# Patient Record
Sex: MALE | Race: WHITE | NOT HISPANIC OR LATINO | Employment: OTHER | URBAN - METROPOLITAN AREA
[De-identification: names, ages, dates, MRNs, and addresses within clinical notes are randomized per-mention and may not be internally consistent; named-entity substitution may affect disease eponyms.]

---

## 2017-08-10 ENCOUNTER — OFFICE VISIT (OUTPATIENT)
Dept: FAMILY MEDICINE | Facility: CLINIC | Age: 57
End: 2017-08-10
Payer: COMMERCIAL

## 2017-08-10 VITALS
HEIGHT: 70 IN | WEIGHT: 195.4 LBS | TEMPERATURE: 98.3 F | DIASTOLIC BLOOD PRESSURE: 72 MMHG | SYSTOLIC BLOOD PRESSURE: 103 MMHG | HEART RATE: 79 BPM | BODY MASS INDEX: 27.97 KG/M2

## 2017-08-10 DIAGNOSIS — Z72.0 TOBACCO ABUSE: Primary | ICD-10-CM

## 2017-08-10 PROCEDURE — 99213 OFFICE O/P EST LOW 20 MIN: CPT | Performed by: FAMILY MEDICINE

## 2017-08-10 RX ORDER — BUPROPION HYDROCHLORIDE 150 MG/1
TABLET, EXTENDED RELEASE ORAL
Qty: 60 TABLET | Refills: 2 | Status: SHIPPED | OUTPATIENT
Start: 2017-08-10 | End: 2018-05-14

## 2017-08-10 NOTE — MR AVS SNAPSHOT
After Visit Summary   8/10/2017    Jerald Lawrence    MRN: [id number]           Patient Information     Date Of Birth          1960        Visit Information        Provider Department      8/10/2017 4:00 PM Skip Rubio MD [facility]        Today's Diagnoses     Tobacco abuse    -  1      Care Instructions    Prescription for Bupropionwas sent to your pharmacy as you requested. Watch out for possible side effects: stomach upset, heartburn, vivid dreams or mood instability.  Call clinic if these are experienced.  Stop smoking completely within 1-2 weeks of starting the medication.  Return to clinic in 1 month for a follow up if necessary.    Look into tapping resources in Quit Plan hotline or website for assist in smoking cessation. Refer to the card/handout given to you.          Follow-ups after your visit        Who to contact     If you have questions or need follow up information about today's clinic visit or your schedule please contact [facility] directly at [phone].  Normal or non-critical lab and imaging results will be communicated to you by SinColahart, letter or phone within 4 business days after the clinic has received the results. If you do not hear from us within 7 days, please contact the clinic through AGRIMAPSt or phone. If you have a critical or abnormal lab result, we will notify you by phone as soon as possible.  Submit refill requests through Money Toolkit or call your pharmacy and they will forward the refill request to us. Please allow 3 business days for your refill to be completed.          Additional Information About Your Visit        MyChart Information     [employer]kit gives you secure access to your electronic health record. If you see a primary care provider, you can also send messages to your care team and make appointments. If you have questions, please call your primary care clinic.  If you do not have a primary care provider, please  "call [phone] and they will assist you.        Care EveryWhere ID     This is your Care EveryWhere ID. This could be used by other organizations to access your [place] medical records  [id number]        Your Vitals Were     Pulse Temperature Height BMI (Body Mass Index)          79 98.3  F (36.8  C) (Tympanic) 5' 10.25\" (1.784 m) 27.84 kg/m2         Blood Pressure from Last 3 Encounters:   08/10/17 103/72   04/20/16 115/73   04/28/14 119/84    Weight from Last 3 Encounters:   08/10/17 195 lb 6.4 oz (88.6 kg)   04/20/16 204 lb (92.5 kg)   04/21/14 179 lb (81.2 kg)              Today, you had the following     No orders found for display         Today's Medication Changes          These changes are accurate as of: 8/10/17  4:19 PM.  If you have any questions, ask your nurse or doctor.               Start taking these medicines.        Dose/Directions    buPROPion 150 MG 12 hr tablet   Commonly known as:  WELLBUTRIN SR   Used for:  Tobacco abuse   Started by:  Skip Rubio MD        Take 1 tablet once daily and increase to 1 tablet twice daily after 4 to 7 days   Quantity:  60 tablet   Refills:  2            Where to get your medicines      These medications were sent to [facility] [address] [facility] [facility]  [address] [facility] [address]     Phone:  [phone]     buPROPion 150 MG 12 hr tablet                Primary Care Provider Office Phone # Fax #    Ewa ANAIS Forde [facility] [phone]       [facility] [facility] [place] [address]        Equal Access to Services     CHIQUITA CONN AH: Hadii augusto Carmona, carida luedilia, qaybta kaalwiliam driver. So [facility] [phone].    ATENCIÓN: Si habla español, tiene a mayorga disposición servicios gratuitos de asistencia lingüística. Llame al [phone].    We comply with applicable federal civil rights laws and [place] laws. We do not discriminate on the basis " of race, color, national origin, age, disability sex, sexual orientation or gender identity.            Thank you!     Thank you for choosing [facility]  for your care. Our goal is always to provide you with excellent care. Hearing back from our patients is one way we can continue to improve our services. Please take a few minutes to complete the written survey that you may receive in the mail after your visit with us. Thank you!             Your Updated Medication List - Protect others around you: Learn how to safely use, store and throw away your medicines at www.disposemymeds.org.          This list is accurate as of: 8/10/17  4:19 PM.  Always use your most recent med list.                   Brand Name Dispense Instructions for use Diagnosis    buPROPion 150 MG 12 hr tablet    WELLBUTRIN SR    60 tablet    Take 1 tablet once daily and increase to 1 tablet twice daily after 4 to 7 days    Tobacco abuse       NO ACTIVE MEDICATIONS      None Entered

## 2017-08-10 NOTE — PROGRESS NOTES
SUBJECTIVE:                                                    Jerald Lawrence is a 57 year old male who presents to clinic today for the following health issues:  Chief Complaint   Patient presents with     Smoking Cessation     Pt here for quit smoking meds.  He is interested in using zyban.       Pt has smoked for 30 yrs.  Pt has quit in the past, most recent episode quit for 2 1/2 yrs.  Restarted smoking 3 months ago.  Pt used generic zyban last time he quit.    Would like to try again.    Patient denies anxiety, mood instability, GERD, dyspnea, cough, weight loss, dysphagia or mouth ulcers.      Problem list and histories reviewed & adjusted, as indicated.  Additional history: as documented    Patient Active Problem List   Diagnosis     CARDIOVASCULAR SCREENING; LDL GOAL LESS THAN 130     Tobacco abuse     Thyroid nodule     History reviewed. No pertinent surgical history.    Social History   Substance Use Topics     Smoking status: Current Every Day Smoker     Packs/day: 0.75     Years: 30.00     Types: Cigarettes     Smokeless tobacco: Current User     Alcohol use No     Family History   Problem Relation Age of Onset     Cancer - colorectal Father      C.A.D. Father      Blood Disease Brother      Cardiovascular Brother      C.A.D. Brother          Current Outpatient Prescriptions   Medication Sig Dispense Refill     buPROPion (WELLBUTRIN SR) 150 MG 12 hr tablet Take 1 tablet once daily and increase to 1 tablet twice daily after 4 to 7 days 60 tablet 2     NO ACTIVE MEDICATIONS None Entered       No Known Allergies      Reviewed and updated as needed this visit by clinical staffTobacco  Allergies  Med Hx  Surg Hx  Fam Hx  Soc Hx      Reviewed and updated as needed this visit by Provider         ROS:  C: NEGATIVE for fever, chills, change in weight  I: NEGATIVE for worrisome rashes, moles or lesions  E: NEGATIVE for vision changes or irritation  E/M: NEGATIVE for ear, mouth and throat problems  R:  "NEGATIVE for significant cough or SOB  CV: NEGATIVE for chest pain, palpitations or peripheral edema  GI: NEGATIVE for nausea, abdominal pain, heartburn, or change in bowel habits  : NEGATIVE for frequency, dysuria, or hematuria  M: NEGATIVE for significant arthralgias or myalgia  N: NEGATIVE for weakness, dizziness or paresthesias  E: NEGATIVE for temperature intolerance, skin/hair changes  H: NEGATIVE for bleeding problems  P: NEGATIVE for changes in mood or affect    OBJECTIVE:                                                    /72  Pulse 79  Temp 98.3  F (36.8  C) (Tympanic)  Ht 5' 10.25\" (1.784 m)  Wt 195 lb 6.4 oz (88.6 kg)  BMI 27.84 kg/m2  Body mass index is 27.84 kg/(m^2).  GENERAL:  alert and no distress, ambulatory w/o assist  MOUTH: moist mucosae; no oral or pharyngeal ulcers/mass  NECK: no tenderness, no adenopathy,  Thyroid not enlarged  RESP: lungs clear to auscultation - no rales, no rhonchi, no wheezes  CV: regular rates and rhythm, no murmur  MS: no edema  SKIN: no suspicious lesions, no rashes    Diagnostic test results:  Diagnostic Test Results:  none        ASSESSMENT/PLAN:                                                        ICD-10-CM    1. Tobacco abuse Z72.0 buPROPion (WELLBUTRIN SR) 150 MG 12 hr tablet     Discussed options for cessation.  Prescription for the above was sent to pharmacy as requested.  Advised to watch out for possible side effects and to call clinic if these occur.  Stop smoking completely within 1-2 weeksof starting the medication.  Quit plan card given to patient and discussed the program.    Follow up with Provider - 1-2 months if needed.   Patient Instructions     Prescription for Bupropionwas sent to your pharmacy as you requested. Watch out for possible side effects: stomach upset, heartburn, vivid dreams or mood instability.  Call clinic if these are experienced.  Stop smoking completely within 1-2 weeks of starting the medication.  Return to clinic in " 1 month for a follow up if necessary.    Look into tapping resources in Quit Plan hotline or website for assist in smoking cessation. Refer to the card/handout given to you.  Coping with Smoking Withdrawal  For the first few days after you quit smoking, you may feel cranky, restless, depressed, or low on energy. These are symptoms of withdrawal. Your body needs time to recover from smoking. Your symptoms should lessen within a few days.    Coping with the urge to smoke    Deep-breathe. Inhale through your nose. Count to 5. Slowly exhale through your mouth.    Drink water. Try to drink 8 or more 8-ounce glasses of water a day.    Keep your hands busy. Wash your car. Draw. Do a puzzle. Build a Dyyno.    Delay. The urge to smoke lasts only 3 to 5 minutes.  Get support  Individual, group, and telephone counseling can help keep you on track. Ask your healthcare provider for more information about resources available to you.  Control stress  After you quit, you may feel irritable and stressed. Try taking a warm bath or shower. Listen to music. Go for a walk or to the gym. Try yoga or meditate. Call friends or talk with a professional.  Exercise  Exercise helps your body and mind feel better. There are many ways to be more active. Find something you enjoy doing. See if a friend will join you for a walk or a bike ride.  Sleep better  You may feel tired but have trouble falling asleep. Try to relax before bed. Do a few stretching exercises. Read for a while. Also, avoid caffeine for at least a few hours before bedtime.  Get fit, not fat  You may notice an increased appetite. Many people who quit smoking gain a few pounds. To limit weight gain, try to watch what you eat. Cut back on fat in your diet. Snack on low-calorie foods, like fresh fruits and vegetables. Drink low-calorie liquids, especially water. Regular exercise can also help you stay fit. And remember: Your main goal is to be a nonsmoker. Stay focused on that  goal.  Quit-smoking products  There are a number of products that can help you quit smoking including medicines and nicotine replacement products. They are available over-the-counter or by prescription. Ask your healthcare provider if any of these could help you quit smoking.        For more information    https://smokefree.gov/eomc-tm-lp-expert    National [employer] [place] Smoking Quitline: 877-44U-QUIT (548-847-3631)   Date Last Reviewed: 2/1/2017 2000-2017 [facility]. [address] [place] [address], [place] [address]. All rights reserved. This information is not intended as a substitute for professional medical care. Always follow your healthcare professional's instructions.            Skip Rubio MD  [facility]

## 2017-08-10 NOTE — NURSING NOTE
"Chief Complaint   Patient presents with     Smoking Cessation     Pt here for quit smoking meds.  He is interested in using zyban.       Initial /72  Pulse 79  Temp 98.3  F (36.8  C) (Tympanic)  Ht 5' 10.25\" (1.784 m)  Wt 195 lb 6.4 oz (88.6 kg)  BMI 27.84 kg/m2 Estimated body mass index is 27.84 kg/(m^2) as calculated from the following:    Height as of this encounter: 5' 10.25\" (1.784 m).    Weight as of this encounter: 195 lb 6.4 oz (88.6 kg).  Medication Reconciliation: complete  Sharmin Cullen CMA    "

## 2017-08-10 NOTE — PATIENT INSTRUCTIONS
Prescription for Bupropionwas sent to your pharmacy as you requested. Watch out for possible side effects: stomach upset, heartburn, vivid dreams or mood instability.  Call clinic if these are experienced.  Stop smoking completely within 1-2 weeks of starting the medication.  Return to clinic in 1 month for a follow up if necessary.    Look into tapping resources in Quit Plan hotline or website for assist in smoking cessation. Refer to the card/handout given to you.  Coping with Smoking Withdrawal  For the first few days after you quit smoking, you may feel cranky, restless, depressed, or low on energy. These are symptoms of withdrawal. Your body needs time to recover from smoking. Your symptoms should lessen within a few days.    Coping with the urge to smoke    Deep-breathe. Inhale through your nose. Count to 5. Slowly exhale through your mouth.    Drink water. Try to drink 8 or more 8-ounce glasses of water a day.    Keep your hands busy. Wash your car. Draw. Do a puzzle. Build a [employer].    Delay. The urge to smoke lasts only 3 to 5 minutes.  Get support  Individual, group, and telephone counseling can help keep you on track. Ask your healthcare provider for more information about resources available to you.  Control stress  After you quit, you may feel irritable and stressed. Try taking a warm bath or shower. Listen to music. Go for a walk or to the gym. Try yoga or meditate. Call friends or talk with a professional.  Exercise  Exercise helps your body and mind feel better. There are many ways to be more active. Find something you enjoy doing. See if a friend will join you for a walk or a bike ride.  Sleep better  You may feel tired but have trouble falling asleep. Try to relax before bed. Do a few stretching exercises. Read for a while. Also, avoid caffeine for at least a few hours before bedtime.  Get fit, not fat  You may notice an increased appetite. Many people who quit smoking gain a few pounds. To  limit weight gain, try to watch what you eat. Cut back on fat in your diet. Snack on low-calorie foods, like fresh fruits and vegetables. Drink low-calorie liquids, especially water. Regular exercise can also help you stay fit. And remember: Your main goal is to be a nonsmoker. Stay focused on that goal.  Quit-smoking products  There are a number of products that can help you quit smoking including medicines and nicotine replacement products. They are available over-the-counter or by prescription. Ask your healthcare provider if any of these could help you quit smoking.        For more information    https://smokefree.gov/crxa-th-db-expert    National Cancer [place] Smoking Quitline: 877-44U-QUIT (336-602-4447)   Date Last Reviewed: 2/1/2017 2000-2017 [employer]. [address] [place] [address], [place] [address]. All rights reserved. This information is not intended as a substitute for professional medical care. Always follow your healthcare professional's instructions.

## 2017-10-16 ENCOUNTER — TELEPHONE (OUTPATIENT)
Dept: FAMILY MEDICINE | Facility: CLINIC | Age: 57
End: 2017-10-16

## 2017-10-16 NOTE — LETTER
October 16, 2017      Jerald Lawrence  [address] [place] [address]  [place] [address]        At this time you are due for a Colon Cancer Screening. Here is some information regarding this testing.     Recommended every 5-10 years, depending on your history, in order to prevent and detect colon cancer at its earliest stages.  Colon cancer is now the second leading cause of death in the [place] for both men and women and there are over 130,000 new cases and 50,000 deaths per year from colon cancer.  Colonoscopies can prevent 90-95% of these deaths.  Problem lesions can be removed before they ever become cancer. This test is not only looking for cancer, but also getting rid of precancerious lesions. You are usually given some sedation which makes the test very comfortable for most people.      If you do not wish to do a colonoscopy or cannot afford to do one, at this time, there is another option. It is called a FIT test or Fecal Immunochemical Occult Blood Test (take home stool sample kit).  It does not replace the colonoscopy for colorectal cancer screening, but it can detect hidden bleeding in the lower colon.  It does need to be repeated every year and if a positive result is obtained, you would be referred for a colonoscopy. The FIT test is really easy to do and does not require any  diet or medication restrictions and involves only one collection sample.      If you have completed either one of these tests or had a flexible sigmoidoscopy in the past five years at another facility, please have the records sent to our clinic so that we can best coordinate your care and update your chart.  Please call us if you have questions or would like arrange either to do a colonoscopy or obtain the necessary test kit for the Fecal Occult Test.          Sincerely,        Skip Rubio MD

## 2018-01-04 ENCOUNTER — TELEPHONE (OUTPATIENT)
Dept: FAMILY MEDICINE | Facility: CLINIC | Age: 58
End: 2018-01-04

## 2018-01-04 NOTE — TELEPHONE ENCOUNTER
1/4/2018    Call Regarding Preventive Health Screening Colonoscopy    Attempt 1    Message on voicemail     Comments:       Outreach   SB

## 2018-01-13 NOTE — TELEPHONE ENCOUNTER
1/13/2018    Call Regarding Preventive Health Screening Colonoscopy    Attempt 2    Message on voicemail    Comments:       Outreach   Jacki Saucedo

## 2018-02-16 NOTE — TELEPHONE ENCOUNTER
2/16/2018    Call Regarding Preventive Health Screening Colonoscopy    Attempt 3    Message No answer    Comments:       Outreach   CC

## 2018-05-14 ENCOUNTER — OFFICE VISIT (OUTPATIENT)
Dept: FAMILY MEDICINE | Facility: CLINIC | Age: 58
End: 2018-05-14
Payer: COMMERCIAL

## 2018-05-14 VITALS
DIASTOLIC BLOOD PRESSURE: 84 MMHG | TEMPERATURE: 97.5 F | BODY MASS INDEX: 28.77 KG/M2 | HEART RATE: 65 BPM | HEIGHT: 70 IN | WEIGHT: 201 LBS | SYSTOLIC BLOOD PRESSURE: 125 MMHG

## 2018-05-14 DIAGNOSIS — Z80.42 FAMILY HISTORY OF PROSTATE CANCER: ICD-10-CM

## 2018-05-14 DIAGNOSIS — Z00.00 ENCOUNTER FOR ROUTINE ADULT HEALTH EXAMINATION WITHOUT ABNORMAL FINDINGS: Primary | ICD-10-CM

## 2018-05-14 DIAGNOSIS — Z72.0 TOBACCO ABUSE: ICD-10-CM

## 2018-05-14 DIAGNOSIS — Z12.5 SCREENING FOR PROSTATE CANCER: ICD-10-CM

## 2018-05-14 DIAGNOSIS — H91.93 BILATERAL HEARING LOSS, UNSPECIFIED HEARING LOSS TYPE: ICD-10-CM

## 2018-05-14 DIAGNOSIS — E78.2 MIXED HYPERLIPIDEMIA: ICD-10-CM

## 2018-05-14 DIAGNOSIS — Z11.4 SCREENING FOR HUMAN IMMUNODEFICIENCY VIRUS: ICD-10-CM

## 2018-05-14 DIAGNOSIS — Z11.59 NEED FOR HEPATITIS C SCREENING TEST: ICD-10-CM

## 2018-05-14 DIAGNOSIS — Z13.1 SCREENING FOR DIABETES MELLITUS: ICD-10-CM

## 2018-05-14 DIAGNOSIS — Z12.11 SCREENING FOR MALIGNANT NEOPLASM OF COLON: ICD-10-CM

## 2018-05-14 LAB
CHOLEST SERPL-MCNC: 221 MG/DL
GLUCOSE SERPL-MCNC: 92 MG/DL (ref 70–99)
HDLC SERPL-MCNC: 49 MG/DL
LDLC SERPL CALC-MCNC: 157 MG/DL
NONHDLC SERPL-MCNC: 172 MG/DL
PSA SERPL-ACNC: 1.09 UG/L (ref 0–4)
TRIGL SERPL-MCNC: 73 MG/DL

## 2018-05-14 PROCEDURE — 80061 LIPID PANEL: CPT | Performed by: FAMILY MEDICINE

## 2018-05-14 PROCEDURE — 86803 HEPATITIS C AB TEST: CPT | Performed by: FAMILY MEDICINE

## 2018-05-14 PROCEDURE — G0103 PSA SCREENING: HCPCS | Performed by: FAMILY MEDICINE

## 2018-05-14 PROCEDURE — 82947 ASSAY GLUCOSE BLOOD QUANT: CPT | Performed by: FAMILY MEDICINE

## 2018-05-14 PROCEDURE — 36415 COLL VENOUS BLD VENIPUNCTURE: CPT | Performed by: FAMILY MEDICINE

## 2018-05-14 PROCEDURE — 99396 PREV VISIT EST AGE 40-64: CPT | Performed by: FAMILY MEDICINE

## 2018-05-14 PROCEDURE — 87389 HIV-1 AG W/HIV-1&-2 AB AG IA: CPT | Performed by: FAMILY MEDICINE

## 2018-05-14 NOTE — PATIENT INSTRUCTIONS
Start Aspirin 81 mg orally once a day.    You are strongly advised to stop smoking soon!  Continuing to smoke will increase your risk for heart disease, stroke, cancer, and  lung damage.  Look into tapping resources in Quit Plan hotline or website for assist in smoking cessation. Refer to the card/handout given to you.    Go to [facility] now for your blood draw.  You will be contacted in 1-2 days for results of your lab tests.    Be consistent with low trans fat and saturated fat diet.  Eat food rich in omega-3-fatty acids as you tolerate. (salmon, olive oil)  Eat 5 cups of vegetables, fruits and whole grains per day.  Limit starchy food (white rice, white bread, white pasta, white potatoes) to less than a cup per meal.  Minimize sweets, junk food and fastfood. Limit soda beverages to one serving per day; best to avoid it altogether though.  Exercise: moderate intensity sustained for at least 30 mins per episode, goal of 150 mins per week at least  Combine cardiovascular and resistance exercises.  These exercise recommendations are in addition to your daily activity at work or home.  Work on losing some weight    Schedule colonoscopy for screening for colon cancer at your soonest convenience.    Preventive Health Recommendations  Male Ages 50   64    Yearly exam:             See your health care provider every year in order to  o   Review health changes.   o   Discuss preventive care.    o   Review your medicines if your doctor has prescribed any.     Have a cholesterol test every 5 years, or more frequently if you are at risk for high cholesterol/heart disease.     Have a diabetes test (fasting glucose) every three years. If you are at risk for diabetes, you should have this test more often.     Have a colonoscopy at age 50, or have a yearly FIT test (stool test). These exams will check for colon cancer.      Talk with your health care provider about whether or not a prostate cancer screening test (PSA) is right for  you.    You should be tested each year for STDs (sexually transmitted diseases), if you re at risk.     Shots: Get a flu shot each year. Get a tetanus shot every 10 years.     Nutrition:    Eat at least 5 servings of fruits and vegetables daily.     Eat whole-grain bread, whole-wheat pasta and brown rice instead of white grains and rice.     Talk to your provider about Calcium and Vitamin D.     Lifestyle    Exercise for at least 150 minutes a week (30 minutes a day, 5 days a week). This will help you control your weight and prevent disease.     Limit alcohol to one drink per day.     No smoking.     Wear sunscreen to prevent skin cancer.     See your dentist every six months for an exam and cleaning.     See your eye doctor every 1 to 2 years.

## 2018-05-14 NOTE — MR AVS SNAPSHOT
After Visit Summary   5/14/2018    Jerald Lawrence    MRN: [id number]           Patient Information     Date Of Birth          1960        Visit Information        Provider Department      5/14/2018 7:40 AM Skip Rubio MD [facility]        Today's Diagnoses     Encounter for routine adult health examination without abnormal findings    -  1    Bilateral hearing loss, unspecified hearing loss type        Tobacco abuse        Mixed hyperlipidemia        Screening for diabetes mellitus        Family history of prostate cancer        Screening for prostate cancer        Screening for human immunodeficiency virus        Need for hepatitis C screening test        Screening for malignant neoplasm of colon          Care Instructions    Start Aspirin 81 mg orally once a day.    You are strongly advised to stop smoking soon!  Continuing to smoke will increase your risk for heart disease, stroke, cancer, and  lung damage.  Look into tapping resources in Quit Plan hotline or website for assist in smoking cessation. Refer to the card/handout given to you.    Go to [facility] now for your blood draw.  You will be contacted in 1-2 days for results of your lab tests.    Be consistent with low trans fat and saturated fat diet.  Eat food rich in omega-3-fatty acids as you tolerate. (salmon, olive oil)  Eat 5 cups of vegetables, fruits and whole grains per day.  Limit starchy food (white rice, white bread, white pasta, white potatoes) to less than a cup per meal.  Minimize sweets, junk food and fastfood. Limit soda beverages to one serving per day; best to avoid it altogether though.  Exercise: moderate intensity sustained for at least 30 mins per episode, goal of 150 mins per week at least  Combine cardiovascular and resistance exercises.  These exercise recommendations are in addition to your daily activity at work or home.  Work on losing some weight    Schedule colonoscopy for screening  for colon cancer at your soonest convenience.    Preventive Health Recommendations  Male Ages 50 - 64    Yearly exam:             See your health care provider every year in order to  o   Review health changes.   o   Discuss preventive care.    o   Review your medicines if your doctor has prescribed any.     Have a cholesterol test every 5 years, or more frequently if you are at risk for high cholesterol/heart disease.     Have a diabetes test (fasting glucose) every three years. If you are at risk for diabetes, you should have this test more often.     Have a colonoscopy at age 50, or have a yearly FIT test (stool test). These exams will check for colon cancer.      Talk with your health care provider about whether or not a prostate cancer screening test (PSA) is right for you.    You should be tested each year for STDs (sexually transmitted diseases), if you re at risk.     Shots: Get a flu shot each year. Get a tetanus shot every 10 years.     Nutrition:    Eat at least 5 servings of fruits and vegetables daily.     Eat whole-grain bread, whole-wheat pasta and brown rice instead of white grains and rice.     Talk to your provider about Calcium and Vitamin D.     Lifestyle    Exercise for at least 150 minutes a week (30 minutes a day, 5 days a week). This will help you control your weight and prevent disease.     Limit alcohol to one drink per day.     No smoking.     Wear sunscreen to prevent skin cancer.     See your dentist every six months for an exam and cleaning.     See your eye doctor every 1 to 2 years.            Follow-ups after your visit        Additional Services     GASTROENTEROLOGY ADULT REF PROCEDURE ONLY [facility] [phone]; [place] General Surgeon       Last Lab Result: Creatinine (mg/dL)       Date                     Value                 04/18/2014               0.88             ----------  Body mass index is 28.64 kg/(m^2).     Needed:  No  Language:  English    Patient  will be contacted to schedule procedure.     Please be aware that coverage of these services is subject to the terms and limitations of your health insurance plan.  Call member services at your health plan with any benefit or coverage questions.  Any procedures must be performed at a [facility] OR coordinated by your clinic's referral office.    Please bring the following with you to your appointment:    (1) Any X-Rays, CTs or MRIs which have been performed.  Contact the facility where they were done to arrange for  prior to your scheduled appointment.    (2) List of current medications   (3) This referral request   (4) Any documents/labs given to you for this referral                  Follow-up notes from your care team     Return if symptoms worsen or fail to improve.      Who to contact     If you have questions or need follow up information about today's clinic visit or your schedule please contact [facility] directly at [phone].  Normal or non-critical lab and imaging results will be communicated to you by MyChart, letter or phone within 4 business days after the clinic has received the results. If you do not hear from us within 7 days, please contact the clinic through G[facility]art or phone. If you have a critical or abnormal lab result, we will notify you by phone as soon as possible.  Submit refill requests through [employer] or call your pharmacy and they will forward the refill request to us. Please allow 3 business days for your refill to be completed.          Additional Information About Your Visit        GLSShart Information     [employer] gives you secure access to your electronic health record. If you see a primary care provider, you can also send messages to your care team and make appointments. If you have questions, please call your primary care clinic.  If you do not have a primary care provider, please call [phone] and they will assist you.        Care EveryWhere ID      "This is your Care EveryWhere ID. This could be used by other organizations to access your [facility] medical records  [id number]        Your Vitals Were     Pulse Temperature Height BMI (Body Mass Index)          65 97.5  F (36.4  C) (Tympanic) 5' 10.25\" (1.784 m) 28.64 kg/m2         Blood Pressure from Last 3 Encounters:   05/14/18 125/84   08/10/17 103/72   04/20/16 115/73    Weight from Last 3 Encounters:   05/14/18 201 lb (91.2 kg)   08/10/17 195 lb 6.4 oz (88.6 kg)   04/20/16 204 lb (92.5 kg)              We Performed the Following     GASTROENTEROLOGY ADULT REF PROCEDURE ONLY [facility] [phone]; [facility] General Surgeon     GLUCOSE     Hepatitis C Screen Reflex to HCV RNA Quant and Genotype     HIV Antigen Antibody Combo     Lipid panel reflex to direct LDL Fasting     Prostate spec antigen screen        Primary Care Provider Office Phone # Fax #    Ewa Sal, APRN [facility] [phone] [address] [facility] [address]        Equal Access to Services     CHIQUITA CONN : Hadii aad ku hadasho Soomaali, waaxda luqadaha, qaybta kaalmada adeegyada, wiliam castro . So [facility] [phone].    ATENCIÓN: Si habla español, tiene a mayorga disposición servicios gratuitos de asistencia lingüística. Miladisame al [phone].    We comply with applicable federal civil rights laws and [place] laws. We do not discriminate on the basis of race, color, national origin, age, disability, sex, sexual orientation, or gender identity.            Thank you!     Thank you for choosing [facility]  for your care. Our goal is always to provide you with excellent care. Hearing back from our patients is one way we can continue to improve our services. Please take a few minutes to complete the written survey that you may receive in the mail after your visit with us. Thank you!             Your Updated Medication List - Protect others around you: Learn how to safely use, " store and throw away your medicines at www.disposemymeds.org.          This list is accurate as of 5/14/18  8:12 AM.  Always use your most recent med list.                   Brand Name Dispense Instructions for use Diagnosis    NO ACTIVE MEDICATIONS      None Entered

## 2018-05-14 NOTE — PROGRESS NOTES
SUBJECTIVE:   CC: Jerald Lawrence is an 57 year old male who presents for preventative health visit.     Healthy Habits:    Do you get at least three servings of calcium containing foods daily (dairy, green leafy vegetables, etc.)? yes    Amount of exercise or daily activities, outside of work: none    Problems taking medications regularly not applicable    Medication side effects: Na    Have you had an eye exam in the past two years? yes    Do you see a dentist twice per year? yes    Do you have sleep apnea, excessive snoring or daytime drowsiness?no     Check ears, is going to have a hearing test and getting fitted for hearing aids. Needs to schedule it still.  Patient states he uses earplugs at work.    Today's PHQ-2 Score:   PHQ-2 ( 1999 Pfizer) 5/14/2018 8/10/2017   Q1: Little interest or pleasure in doing things 0 0   Q2: Feeling down, depressed or hopeless 0 0   PHQ-2 Score 0 0       Abuse: Current or Past(Physical, Sexual or Emotional)- No  Do you feel safe in your environment - Yes    Social History   Substance Use Topics     Smoking status: Current Every Day Smoker     Packs/day: 0.75     Years: 35.00     Types: Cigarettes     Smokeless tobacco: Current User     Alcohol use No      If you drink alcohol do you typically have >3 drinks per day or >7 drinks per week? No   Patient states he would like to quit and plans to do cold turkey this time.                      Last PSA: No results found for: PSA  The ASCVD Risk score (Patricia NORMA Jr, et al., 2013) failed to calculate for the following reasons:    Cannot find a previous HDL lab    Cannot find a previous total cholesterol lab   Patient is eligible for use of low-dose aspirin for primary prevention of heart attack and stroke.  Provider has discussed aspirin with patient and our decision was:     Prescribe:  Daily low-dose aspirin recommended for primary prevention, patient agrees with plan.      Reviewed orders with patient. Reviewed health maintenance and  updated orders accordingly - Yes  BP Readings from Last 3 Encounters:   05/14/18 125/84   08/10/17 103/72   04/20/16 115/73    Wt Readings from Last 3 Encounters:   05/14/18 201 lb (91.2 kg)   08/10/17 195 lb 6.4 oz (88.6 kg)   04/20/16 204 lb (92.5 kg)                  Patient Active Problem List   Diagnosis     CARDIOVASCULAR SCREENING; LDL GOAL LESS THAN 130     Tobacco abuse     Thyroid nodule     History reviewed. No pertinent surgical history.    Social History   Substance Use Topics     Smoking status: Current Every Day Smoker     Packs/day: 0.75     Years: 35.00     Types: Cigarettes     Smokeless tobacco: Current User     Alcohol use No     Family History   Problem Relation Age of Onset     Cancer - colorectal Father      C.A.D. Father      Blood Disease Brother      Cardiovascular Brother      C.A.D. Brother          Current Outpatient Prescriptions   Medication Sig Dispense Refill     NO ACTIVE MEDICATIONS None Entered       No Known Allergies    Reviewed and updated as needed this visit by clinical staff  Tobacco  Allergies  Meds  Problems  Med Hx  Surg Hx  Fam Hx  Soc Hx          Reviewed and updated as needed this visit by Provider  Allergies  Meds  Problems        Past Medical History:   Diagnosis Date     Unspecified part of closed fracture of clavicle     comminuted displaced fx of left clavicle      History reviewed. No pertinent surgical history.    ROS:  C: NEGATIVE for fever, chills, change in weight  I: NEGATIVE for worrisome rashes, moles or lesions  E: NEGATIVE for vision changes or irritation  ENT: NEGATIVE for ear pain, mouth and throat problems  R: NEGATIVE for significant cough or SOB  CV: NEGATIVE for chest pain, palpitations or peripheral edema  GI: NEGATIVE for nausea, abdominal pain, heartburn, or change in bowel habits   male: negative for dysuria, hematuria, decreased urinary stream, erectile dysfunction, urethral discharge  M: NEGATIVE for significant arthralgias or  "myalgia  N: NEGATIVE for weakness, dizziness or paresthesias  E: NEGATIVE for temperature intolerance, skin/hair changes  H: NEGATIVE for bleeding problems  P: NEGATIVE for changes in mood or affect    OBJECTIVE:   /84  Pulse 65  Temp 97.5  F (36.4  C) (Tympanic)  Ht 5' 10.25\" (1.784 m)  Wt 201 lb (91.2 kg)  BMI 28.64 kg/m2  EXAM:  GENERAL APPEARANCE: overweight, alert and no distress  EYES: pink conj, no icterus, PERRL, EOMI  HENT: ear canals clear and TM's normal, nose and mouth without ulcers or lesions, oropharynx clear and oral mucous membranes moist  NECK: no adenopathy, no asymmetry, masses, or scars and thyroid normal to palpation  RESP: lungs clear to auscultation - no rales, rhonchi or wheezes  CV: regular rates and rhythm, normal S1 S2, no S3 or S4, no murmur, click or rub, no peripheral edema and peripheral pulses strong  ABDOMEN: soft, nontender, no hepatosplenomegaly, no masses and bowel sounds normal  RECTAL: normal sphincter tone, no rectal masses, prostate slightly enlarged but smooth, soft and nontender  MS: no musculoskeletal defects are noted and gait is age appropriate without ataxia  SKIN: no suspicious lesions or rashes  NEURO: Normal strength and tone, sensory exam grossly normal, mentation intact and speech normal    ASSESSMENT/PLAN:   Jerald was seen today for physical and health maintenance.    Diagnoses and all orders for this visit:    Encounter for routine adult health examination without abnormal findings  Patient was advised on recommended screening and preventive health recommendations.  He verbalized understanding and agreed to the plans below.    Bilateral hearing loss, unspecified hearing loss type  Patient was advisd his EAMs are clear of obstruction.  Patient will proceed with hearing test as he has planned.    Tobacco abuse  Discussed risks of continuous smoking tobacco.  Patient is not interested in quitting completely at this time.  Will continue to  in the " future.  Start ASA 81 mg daily.    Mixed hyperlipidemia  -     Lipid panel reflex to direct LDL Fasting  Reinforced heart healthy lifestyle.  Consider statin if with significant increase in LDL.    Screening for diabetes mellitus  -     GLUCOSE    Family history of prostate cancer    Screening for prostate cancer  -     Prostate spec antigen screen    Screening for human immunodeficiency virus  -     HIV Antigen Antibody Combo    Need for hepatitis C screening test  -     Hepatitis C Screen Reflex to HCV RNA Quant and Genotype    Screening for malignant neoplasm of colon  -     GASTROENTEROLOGY ADULT REF PROCEDURE ONLY [facility] [phone]; [place] General Surgeon      COUNSELING:  Reviewed preventive health counseling, as reflected in patient instructions       Regular exercise       Healthy diet/nutrition       Vision screening       Hearing screening       Aspirin Prophylaxsis       Alcohol Use       Safe sex practices/STD prevention       Consider Hep C screening for patients born between 1945 and 1965       HIV screeninx in teen years, 1x in adult years, and at intervals if high risk       Colon cancer screening       Prostate cancer screening       Osteoporosis Prevention/Bone Health       The ASCVD Risk score (Patricia GREEN Jr, et al., 2013) failed to calculate for the following reasons:    Cannot find a previous HDL lab    Cannot find a previous total cholesterol lab       Advance Care Planning    BP Screening:   Last 3 BP Readings:    BP Readings from Last 3 Encounters:   18 125/84   08/10/17 103/72   16 115/73       The following was recommended to the patient:  Re-screen BP within a year and recommended lifestyle modifications   reports that he has been smoking Cigarettes.  He has a 26.25 pack-year smoking history. He uses smokeless tobacco.  Tobacco Cessation Action Plan: quitplan card given to patient; pt would like to try cold turkey.  Estimated body mass index is 28.64 kg/(m^2) as  "calculated from the following:    Height as of this encounter: 5' 10.25\" (1.784 m).    Weight as of this encounter: 201 lb (91.2 kg).   Weight management plan: Discussed healthy diet and exercise guidelines and patient will follow up in 12 months in clinic to re-evaluate.    Counseling Resources:  ATP IV Guidelines  Pooled Cohorts Equation Calculator  FRAX Risk Assessment  ICSI Preventive Guidelines  Dietary Guidelines for Americans, 2010  USDA's MyPlate  ASA Prophylaxis  Lung CA Screening    Skip Rubio MD  [facility]  "

## 2018-05-15 LAB
HCV AB SERPL QL IA: NONREACTIVE
HIV 1+2 AB+HIV1 P24 AG SERPL QL IA: NONREACTIVE

## 2018-05-16 DIAGNOSIS — E78.2 MIXED HYPERLIPIDEMIA: Primary | ICD-10-CM

## 2018-05-21 PROBLEM — E78.2 MIXED HYPERLIPIDEMIA: Status: ACTIVE | Noted: 2018-05-21

## 2020-02-10 ENCOUNTER — HEALTH MAINTENANCE LETTER (OUTPATIENT)
Age: 60
End: 2020-02-10

## 2020-11-16 ENCOUNTER — HEALTH MAINTENANCE LETTER (OUTPATIENT)
Age: 60
End: 2020-11-16

## 2021-01-11 ENCOUNTER — VIRTUAL VISIT (OUTPATIENT)
Dept: FAMILY MEDICINE | Facility: CLINIC | Age: 61
End: 2021-01-11
Payer: COMMERCIAL

## 2021-01-11 DIAGNOSIS — Z71.6 ENCOUNTER FOR TOBACCO USE CESSATION COUNSELING: ICD-10-CM

## 2021-01-11 DIAGNOSIS — Z72.0 TOBACCO ABUSE: Primary | ICD-10-CM

## 2021-01-11 PROCEDURE — 99213 OFFICE O/P EST LOW 20 MIN: CPT | Mod: 95 | Performed by: FAMILY MEDICINE

## 2021-01-11 RX ORDER — BUPROPION HYDROCHLORIDE 150 MG/1
150 TABLET, FILM COATED, EXTENDED RELEASE ORAL 2 TIMES DAILY
Qty: 60 TABLET | Refills: 0 | Status: SHIPPED | OUTPATIENT
Start: 2021-01-11 | End: 2023-04-26

## 2021-01-11 RX ORDER — ASPIRIN 81 MG/1
81 TABLET ORAL DAILY
Status: ON HOLD | COMMUNITY
End: 2023-07-04

## 2021-01-11 RX ORDER — NICOTINE 21 MG/24HR
1 PATCH, TRANSDERMAL 24 HOURS TRANSDERMAL EVERY 24 HOURS
Qty: 30 PATCH | Refills: 0 | Status: SHIPPED | OUTPATIENT
Start: 2021-01-11 | End: 2023-04-26

## 2021-01-11 NOTE — PATIENT INSTRUCTIONS
Start zyban and nicotine patches.  Need to wean down the patches over several weeks.    Stop smoking completely as you start using the patches.    Get a pneumonia vaccine to protect yourself from serious lung infections.      Patient Education     Getting Support for Quitting Smoking  You don t have to go through the process of quitting smoking without support. Tell people you are quitting. The support of friends, coworkers, and family members can make a big difference. Face-to-face or telephone counseling can also be helpful, as can a stop-smoking class or an ex-smokers  group.     Set a quit date  If you re serious about quitting smoking, choose a date within the next 2 to 4 weeks. Juan Antonio it in bright, bold letters on a calendar you use often. Tell people about your quit date. Ask for their support. Let your friends and family know how they can help you quit.   Make a contract  A quit-smoking contract gives you a goal. Write out the contract and sign it. Have it witnessed, if you like. Then keep the contract where you ll see it often, or carry it with you. Read the contract when you re tempted to smoke.   Take action  On the day you quit, reread your quit contract. Think about the benefits you gain by quitting, such as better health and an improved sense of taste, as well as the money you will save from not smoking. Also:     Remove cigarettes from your home, car, or any other place where you stash them.    Throw away all smoking materials, including matches, lighters, and ashtrays.    Review your list of triggers and your plan for coping with them.    Stay away from people or settings you link with smoking.    Make a quit kit that includes gum, mints, carrot sticks, and things to keep your hands and mouth busy.    Talk to your healthcare provider about using quit-smoking products, such as medicine or a nicotine patch, inhaler, nasal spray, gum, or lozenges.  Ask for help  Sometimes you may just need to talk when  you miss smoking. Ex-smokers are good to talk to, because they re likely to know how you feel. You may need extra support in the first few weeks after you quit. Ask a friend to call you each day to see how you re doing. Telephone counseling can also help you keep on track. Ask your healthcare provider, local hospital, or public health department to put you in touch with a phone counselor. You may also have to deal with doubters when you decide to quit. Explain to any doubters why you are quitting. Tell them that quitting is important to you. Ask for their support. Tell your smoking buddies that you can walk together instead of smoking together. If someone thinks you won t succeed, say that you have a good quit plan and ask for their support. Let him or her know you re sticking with it.   To learn more  Get more tips from these resources:    www.cdc.gov/tobacco/quit_smoking/ 800-QUIT-NOW (465-293-8038)    www.smokefree.gov 877-44U-QUIT (548-020-5552)    www.lung.org/stop-smoking/ 800-LUNGUSA (417-439-2512)  Hita last reviewed this educational content on 5/1/2019 2000-2020 [employer]. [address] [place] [address], [place] [address]. All rights reserved. This information is not intended as a substitute for professional medical care. Always follow your healthcare professional's instructions.           Patient Education     Staying Smoke-Free  Quitting smoking is a big change. People will congratulate you. You have the right to be proud. But later at times you may miss smoking. Plan ahead to resist temptation.   Prepare to be tempted    If you feel the urge to smoke, distract yourself for about 5 to 15 minutes. Drink water. Call a friend, take a walk, or try deep breathing. Chew gum. Usually, the urge to smoke will pass.    Don t trust yourself to have  just one cigarette.  Many ex-smokers get hooked again that way.    Remind yourself why you quit. Tell yourself you can stay quit.    Stay away from people  or places that can trigger you to smoke. Ask others not to smoke in your home or car.    Spend time in places where you can t smoke, such as a museum, a library, a store, or a gym.    Take your nonsmoking life one day at a time. Juan Antonio each day on your calendar.    HALT your desire. Keep yourself from feeling too Hungry, Angry, Lonely, or Tired. Deal with your real needs. Eat, talk, or sleep.    Reward yourself! Put aside cigarette money and buy yourself a treat.    Talk with your healthcare provider about using quit-smoking products. These include medicine or a nicotine patch, inhaler, nasal spray, gum, or lozenges. These can help increase your success by reducing urges.     Deep breathing can help ease the urge to smoke.     If you slip  You may slip and smoke again. Many ex-smokers slip on the way to success. If you do, it s not the end of your quit process. Think about what triggered you to smoke. Then think of ways to prevent future slips. Ask yourself what you can learn from the slip. Decide how you will handle this trigger better in the future. Then get back on track--right away!   Don t give up  Keep telling yourself you re no longer a smoker. Don t lose hope. Most people have tried to quit several times before being successful. Try to stay focused on your plan to be smoke-free. Keep in mind all the benefits of staying quit. Millions of people have given up smoking. You can too.   To learn more    www.cdc.gov/tobacco/quit_smoking/ 800-QUIT-NOW (451-405-6924)    www.smokefree.gov 877-44U-QUIT (915-321-9373)    www.lung.org/stop-smoking/ 800-LUNGUSA (147-162-7020)    Yaw last reviewed this educational content on 5/1/2019 2000-2020 [employer]. [address] [place] [address], [place] [address]. All rights reserved. This information is not intended as a substitute for professional medical care. Always follow your healthcare professional's instructions.

## 2021-01-11 NOTE — PROGRESS NOTES
Jerald is a 60 year old who is being evaluated via a billable telephone visit.      What phone number would you like to be contacted at? [phone]  How would you like to obtain your AVS? Mail a copy  Assessment & Plan     Tobacco abuse    - buPROPion (ZYBAN) 150 MG 12 hr tablet  Dispense: 60 tablet; Refill: 0  - nicotine (NICODERM CQ) 21 MG/24HR 24 hr patch  Dispense: 30 patch; Refill: 0    Encounter for tobacco use cessation counseling    - buPROPion (ZYBAN) 150 MG 12 hr tablet  Dispense: 60 tablet; Refill: 0  - nicotine (NICODERM CQ) 21 MG/24HR 24 hr patch  Dispense: 30 patch; Refill: 0    Patient was advised on use of zyban and nicotine patches.  Advised ways to help success in cessation.  Advised possible ADR to meds.  Recheck in a month on use of his meds.  Tobacco Cessation:   reports that he has been smoking cigarettes. He has a 26.25 pack-year smoking history. He uses smokeless tobacco.  Tobacco Cessation Action Plan: Pharmacotherapies : Zyban/Wellbutrin and Nicotine patch      Patient Instructions     Start zyban and nicotine patches.  Need to wean down the patches over several weeks.    Stop smoking completely as you start using the patches.    Get a pneumonia vaccine to protect yourself from serious lung infections.      Patient Education     Getting Support for Quitting Smoking  You don t have to go through the process of quitting smoking without support. Tell people you are quitting. The support of friends, coworkers, and family members can make a big difference. Face-to-face or telephone counseling can also be helpful, as can a stop-smoking class or an ex-smokers  group.     Set a quit date  If you re serious about quitting smoking, choose a date within the next 2 to 4 weeks. Juan Antonio it in bright, bold letters on a calendar you use often. Tell people about your quit date. Ask for their support. Let your friends and family know how they can help you quit.   Make a contract  A quit-smoking contract gives  you a goal. Write out the contract and sign it. Have it witnessed, if you like. Then keep the contract where you ll see it often, or carry it with you. Read the contract when you re tempted to smoke.   Take action  On the day you quit, reread your quit contract. Think about the benefits you gain by quitting, such as better health and an improved sense of taste, as well as the money you will save from not smoking. Also:     Remove cigarettes from your home, car, or any other place where you stash them.    Throw away all smoking materials, including matches, lighters, and ashtrays.    Review your list of triggers and your plan for coping with them.    Stay away from people or settings you link with smoking.    Make a quit kit that includes gum, mints, carrot sticks, and things to keep your hands and mouth busy.    Talk to your healthcare provider about using quit-smoking products, such as medicine or a nicotine patch, inhaler, nasal spray, gum, or lozenges.  Ask for help  Sometimes you may just need to talk when you miss smoking. Ex-smokers are good to talk to, because they re likely to know how you feel. You may need extra support in the first few weeks after you quit. Ask a friend to call you each day to see how you re doing. Telephone counseling can also help you keep on track. Ask your healthcare provider, local hospital, or public health department to put you in touch with a phone counselor. You may also have to deal with doubters when you decide to quit. Explain to any doubters why you are quitting. Tell them that quitting is important to you. Ask for their support. Tell your smoking buddies that you can walk together instead of smoking together. If someone thinks you won t succeed, say that you have a good quit plan and ask for their support. Let him or her know you re sticking with it.   To learn more  Get more tips from these resources:    www.cdc.gov/tobacco/quit_smoking/ 800-QUIT-NOW  (128.957.4234)    www.smokefree.gov 877-44U-QUIT (463-363-8984)    www.lung.org/stop-smoking/ 800-LUNGUSA (071-500-1485)  Yaw last reviewed this educational content on 5/1/2019 2000-2020 [employer]. [address] [place] [address], [place] [address]. All rights reserved. This information is not intended as a substitute for professional medical care. Always follow your healthcare professional's instructions.           Patient Education     Staying Smoke-Free  Quitting smoking is a big change. People will congratulate you. You have the right to be proud. But later at times you may miss smoking. Plan ahead to resist temptation.   Prepare to be tempted    If you feel the urge to smoke, distract yourself for about 5 to 15 minutes. Drink water. Call a friend, take a walk, or try deep breathing. Chew gum. Usually, the urge to smoke will pass.    Don t trust yourself to have  just one cigarette.  Many ex-smokers get hooked again that way.    Remind yourself why you quit. Tell yourself you can stay quit.    Stay away from people or places that can trigger you to smoke. Ask others not to smoke in your home or car.    Spend time in places where you can t smoke, such as a museum, a library, a store, or a gym.    Take your nonsmoking life one day at a time. Juan Antonio each day on your calendar.    HALT your desire. Keep yourself from feeling too Hungry, Angry, Lonely, or Tired. Deal with your real needs. Eat, talk, or sleep.    Reward yourself! Put aside cigarette money and buy yourself a treat.    Talk with your healthcare provider about using quit-smoking products. These include medicine or a nicotine patch, inhaler, nasal spray, gum, or lozenges. These can help increase your success by reducing urges.     Deep breathing can help ease the urge to smoke.     If you slip  You may slip and smoke again. Many ex-smokers slip on the way to success. If you do, it s not the end of your quit process. Think about what triggered  you to smoke. Then think of ways to prevent future slips. Ask yourself what you can learn from the slip. Decide how you will handle this trigger better in the future. Then get back on track--right away!   Don t give up  Keep telling yourself you re no longer a smoker. Don t lose hope. Most people have tried to quit several times before being successful. Try to stay focused on your plan to be smoke-free. Keep in mind all the benefits of staying quit. Millions of people have given up smoking. You can too.   To learn more    www.cdc.gov/tobacco/quit_smoking/ 800-QUIT-NOW (140-490-7998)    www.smokefree.gov 877-44U-QUIT (126-890-7348)    www.lung.org/stop-smoking/ 800-LUNGUSA (144-150-4102)    Yaw last reviewed this educational content on 5/1/2019 2000-2020 [employer]. [address] [place] [address]. All rights reserved. This information is not intended as a substitute for professional medical care. Always follow your healthcare professional's instructions.               Return in about 1 month (around 2/11/2021) for Follow up on zyban use.    Skip Rubio MD  [facility]    Jung Marques is a 60 year old who presents to clinic today for the following health issues   Chief Complaint   Patient presents with     Smoking Cessation     Pt would like to stop smoking.         HPI   Pt would like to try a rx for zyban.  This has helped him in the past, stopped for 2 years.     Patient has been smoking a pack a day for the last year or year and half.  Tried cutting back already but goes to smoking a lot again.    Denies ADR to Zyban in the past.  Probably will use a nicotine patch initially.    Review of Systems   Constitutional, HEENT, cardiovascular, pulmonary, GI, , musculoskeletal, neuro, skin, endocrine and psych systems are negative, except as otherwise noted.      Objective           Vitals:  No vitals were obtained today due to virtual  visit.    Physical Exam   alert and no distress  PSYCH: Alert and oriented times 3; coherent speech, normal   rate and volume, able to articulate logical thoughts, able   to abstract reason, no tangential thoughts, no hallucinations   or delusions  His affect is normal  RESP: No cough, no audible wheezing, able to talk in full sentences  Remainder of exam unable to be completed due to telephone visits    No results found for any visits on 01/11/21.            Phone call duration: 8 minutes

## 2021-04-03 ENCOUNTER — HEALTH MAINTENANCE LETTER (OUTPATIENT)
Age: 61
End: 2021-04-03

## 2021-04-20 ENCOUNTER — IMMUNIZATION (OUTPATIENT)
Dept: FAMILY MEDICINE | Facility: CLINIC | Age: 61
End: 2021-04-20
Payer: COMMERCIAL

## 2021-04-20 PROCEDURE — 0011A PR COVID VAC MODERNA 100 MCG/0.5 ML IM: CPT

## 2021-04-20 PROCEDURE — 91301 PR COVID VAC MODERNA 100 MCG/0.5 ML IM: CPT

## 2021-05-18 ENCOUNTER — IMMUNIZATION (OUTPATIENT)
Dept: FAMILY MEDICINE | Facility: CLINIC | Age: 61
End: 2021-05-18
Attending: FAMILY MEDICINE
Payer: COMMERCIAL

## 2021-05-18 PROCEDURE — 0012A PR COVID VAC MODERNA 100 MCG/0.5 ML IM: CPT

## 2021-05-18 PROCEDURE — 91301 PR COVID VAC MODERNA 100 MCG/0.5 ML IM: CPT

## 2021-09-18 ENCOUNTER — HEALTH MAINTENANCE LETTER (OUTPATIENT)
Age: 61
End: 2021-09-18

## 2021-11-13 ENCOUNTER — HEALTH MAINTENANCE LETTER (OUTPATIENT)
Age: 61
End: 2021-11-13

## 2022-04-24 ENCOUNTER — HEALTH MAINTENANCE LETTER (OUTPATIENT)
Age: 62
End: 2022-04-24

## 2022-11-19 ENCOUNTER — HEALTH MAINTENANCE LETTER (OUTPATIENT)
Age: 62
End: 2022-11-19

## 2023-04-25 ASSESSMENT — ENCOUNTER SYMPTOMS
ARTHRALGIAS: 0
DYSURIA: 0
HEMATURIA: 0
EYE PAIN: 0
JOINT SWELLING: 0
CHILLS: 0
CONSTIPATION: 0
FREQUENCY: 0
SORE THROAT: 0
FEVER: 0
HEARTBURN: 0
COUGH: 0
DIZZINESS: 0
HEADACHES: 0
MYALGIAS: 0
WEAKNESS: 0
SHORTNESS OF BREATH: 0
NERVOUS/ANXIOUS: 0
NAUSEA: 0
PARESTHESIAS: 0
PALPITATIONS: 0
DIARRHEA: 0
ABDOMINAL PAIN: 0
HEMATOCHEZIA: 0

## 2023-04-26 ENCOUNTER — ANCILLARY PROCEDURE (OUTPATIENT)
Dept: GENERAL RADIOLOGY | Facility: CLINIC | Age: 63
End: 2023-04-26
Attending: FAMILY MEDICINE
Payer: COMMERCIAL

## 2023-04-26 ENCOUNTER — OFFICE VISIT (OUTPATIENT)
Dept: FAMILY MEDICINE | Facility: CLINIC | Age: 63
End: 2023-04-26
Payer: COMMERCIAL

## 2023-04-26 VITALS
WEIGHT: 198.8 LBS | HEART RATE: 61 BPM | TEMPERATURE: 97.2 F | HEIGHT: 71 IN | BODY MASS INDEX: 27.83 KG/M2 | OXYGEN SATURATION: 98 % | RESPIRATION RATE: 16 BRPM | SYSTOLIC BLOOD PRESSURE: 128 MMHG | DIASTOLIC BLOOD PRESSURE: 76 MMHG

## 2023-04-26 DIAGNOSIS — R07.9 RECURRENT CHEST PAIN: ICD-10-CM

## 2023-04-26 DIAGNOSIS — Z23 NEED FOR VACCINATION: ICD-10-CM

## 2023-04-26 DIAGNOSIS — I51.7 LEFT VENTRICULAR HYPERTROPHY BY ELECTROCARDIOGRAM: ICD-10-CM

## 2023-04-26 DIAGNOSIS — Z12.11 SCREEN FOR COLON CANCER: ICD-10-CM

## 2023-04-26 DIAGNOSIS — E78.2 MIXED HYPERLIPIDEMIA: ICD-10-CM

## 2023-04-26 DIAGNOSIS — Z00.00 ROUTINE GENERAL MEDICAL EXAMINATION AT A HEALTH CARE FACILITY: Primary | ICD-10-CM

## 2023-04-26 DIAGNOSIS — Z87.891 PERSONAL HISTORY OF TOBACCO USE: ICD-10-CM

## 2023-04-26 PROCEDURE — 71046 X-RAY EXAM CHEST 2 VIEWS: CPT | Mod: TC | Performed by: RADIOLOGY

## 2023-04-26 PROCEDURE — 90471 IMMUNIZATION ADMIN: CPT | Performed by: FAMILY MEDICINE

## 2023-04-26 PROCEDURE — 99396 PREV VISIT EST AGE 40-64: CPT | Mod: 25 | Performed by: FAMILY MEDICINE

## 2023-04-26 PROCEDURE — 90677 PCV20 VACCINE IM: CPT | Performed by: FAMILY MEDICINE

## 2023-04-26 PROCEDURE — 99215 OFFICE O/P EST HI 40 MIN: CPT | Mod: 25 | Performed by: FAMILY MEDICINE

## 2023-04-26 PROCEDURE — G0296 VISIT TO DETERM LDCT ELIG: HCPCS | Performed by: FAMILY MEDICINE

## 2023-04-26 PROCEDURE — 93000 ELECTROCARDIOGRAM COMPLETE: CPT | Performed by: FAMILY MEDICINE

## 2023-04-26 ASSESSMENT — ENCOUNTER SYMPTOMS
CONSTIPATION: 0
PARESTHESIAS: 0
NAUSEA: 0
WEAKNESS: 0
ABDOMINAL PAIN: 0
DIZZINESS: 0
NERVOUS/ANXIOUS: 0
DYSURIA: 0
SORE THROAT: 0
HEADACHES: 0
MYALGIAS: 0
PALPITATIONS: 0
ARTHRALGIAS: 0
COUGH: 0
FEVER: 0
DIARRHEA: 0
HEARTBURN: 0
JOINT SWELLING: 0
HEMATOCHEZIA: 0
FREQUENCY: 0
HEMATURIA: 0
CHILLS: 0
SHORTNESS OF BREATH: 0
EYE PAIN: 0

## 2023-04-26 ASSESSMENT — PAIN SCALES - GENERAL: PAINLEVEL: NO PAIN (0)

## 2023-04-26 NOTE — PATIENT INSTRUCTIONS
After our discussions today, you preferred to pursue oupatient cardiac testing rather than complete heart work up today in the ER.    Schedule heart stress test.  Contact Cardiac Services  at [phone], to schedule this appointment.   A referral to cardiology due to the abnormal EKG was also placed now so you can be scheduled for a consult with a heart specialist.    If you have persistent chest pain, change in character of pain, more frequent pain, persistent or worsening breathing difficulty, palpitation, lightheadedness or other concerning symptoms, you should be brought to the ER or call 911.    Be consistent with low salt, low trans fat and low saturated fat diet.  Eat food rich in omega-3-fatty acids as you tolerate. (salmon, olive oil)  Eat 5 cups of vegetables, fruits and whole grains per day.  Limit starchy food (white rice, white bread, white pasta, white potatoes) to less than a cup per meal.  Minimize sweets, junk food and fastfood. Limit soda beverages to one serving per day; best to avoid it altogether though.    Exercise as tolerated once your heart work up rules out severe or more acute heart conditions.      Preventive Health Recommendations  Male Ages 50 - 64    Yearly exam:             See your health care provider every year in order to  o   Review health changes.   o   Discuss preventive care.    o   Review your medicines if your doctor has prescribed any.   Have a cholesterol test every 5 years, or more frequently if you are at risk for high cholesterol/heart disease.   Have a diabetes test (fasting glucose) every three years. If you are at risk for diabetes, you should have this test more often.   Have a colonoscopy at age 50, or have a yearly FIT test (stool test). These exams will check for colon cancer.    Talk with your health care provider about whether or not a prostate cancer screening test (PSA) is right for you.  You should be tested each year for STDs (sexually  transmitted diseases), if you re at risk.     Shots: Get a flu shot each year. Get a tetanus shot every 10 years.     Nutrition:  Eat at least 5 servings of fruits and vegetables daily.   Eat whole-grain bread, whole-wheat pasta and brown rice instead of white grains and rice.   Get adequate Calcium and Vitamin D.     Lifestyle  Exercise for at least 150 minutes a week (30 minutes a day, 5 days a week). This will help you control your weight and prevent disease.   Limit alcohol to one drink per day.   No smoking.   Wear sunscreen to prevent skin cancer.   See your dentist every six months for an exam and cleaning.   See your eye doctor every 1 to 2 years.    Lung Cancer Screening   Frequently Asked Questions  If you are at high-risk for lung cancer, getting screened with low-dose computed tomography (LDCT) every year can help save your life. This handout offers answers to some of the most common questions about lung cancer screening. If you have other questions, please call [phone] ([phone]).     What is it?  Lung cancer screening uses special X-ray technology to create an image of your lung tissue. The exam is quick and easy and takes less than 10 seconds. We don t give you any medicine or use any needles. You can eat before and after the exam. You don t need to change your clothes as long as the clothing on your chest doesn t contain metal. But, you do need to be able to hold your breath for at least 6 seconds during the exam.    What is the goal of lung cancer screening?  The goal of lung cancer screening is to save lives. Many times, lung cancer is not found until a person starts having physical symptoms. Lung cancer screening can help detect lung cancer in the earliest stages when it may be easier to treat.    Who should be screened for lung cancer?  We suggest lung cancer screening for anyone who is at high-risk for lung cancer. You are in the high-risk group if you:     are between the ages  of 55 and 79, and   have smoked at least 1 pack of cigarettes a day for 20 or more years, and   still smoke or have quit within the past 15 years.    However, if you have a new cough or shortness of breath, you should talk to your doctor before being screened.    Why does it matter if I have symptoms?  Certain symptoms can be a sign that you have a condition in your lungs that should be checked and treated by your doctor. These symptoms include fever, chest pain, a new or changing cough, shortness of breath that you have never felt before, coughing up blood or unexplained weight loss. Having any of these symptoms can greatly affect the results of lung cancer screening.       Should all smokers get an LDCT lung cancer screening exam?  It depends. Lung cancer screening is for a very specific group of men and women who have a history of heavy smoking over a long period of time (see  Who should be screened for lung cancer  above).  I am in the high-risk group, but have been diagnosed with cancer in the past. Is LDCT lung cancer screening right for me?  In some cases, you should not have LDCT lung screening, such as when your doctor is already following your cancer with CT scan studies. Your doctor will help you decide if LDCT lung screening is right for you.  Do I need to have a screening exam every year?  Yes. If you are in the high-risk group described earlier, you should get an LDCT lung cancer screening exam every year until you are 79, or are no longer willing or able to undergo screening and possible procedures to diagnose and treat lung cancer.  How effective is LDCT at preventing death from lung cancer?  Studies have shown that LDCT lung cancer screening can lower the risk of death from lung cancer by 20 percent in people who are at high-risk.  What are the risks?  There are some risks and limitations of LDCT lung cancer screening. We want to make sure you understand the risks and benefits, so please let us know  if you have any questions. Your doctor may want to talk with you more about these risks.   Radiation exposure: As with any exam that uses radiation, there is a very small increased risk of cancer. The amount of radiation in LDCT is small--about the same amount a person would get from a mammogram. Your doctor orders the exam when he or she feels the potential benefits outweigh the risks.   False negatives: No test is perfect, including LDCT. It is possible that you may have a medical condition, including lung cancer, that is not found during your exam. This is called a false negative result.   False positives and more testing: LDCT very often finds something in the lung that could be cancer, but in fact is not. This is called a false positive result. False positive tests often cause anxiety. To make sure these findings are not cancer, you may need to have more tests. These tests will be done only if you give us permission. Sometimes patients need a treatment that can have side effects, such as a biopsy. For more information on false positives, see  What can I expect from the results?    Findings not related to lung cancer: Your LDCT exam also takes pictures of areas of your body next to your lungs. In a very small number of cases, the CT scan will show an abnormal finding in one of these areas, such as your kidneys, adrenal glands, liver or thyroid. This finding may not be serious, but you may need more tests. Your doctor can help you decide what other tests you may need, if any.  What can I expect from the results?  About 1 out of 4 LDCT exams will find something that may need more tests. Most of the time, these findings are lung nodules. Lung nodules are very small collections of tissue in the lung. These nodules are very common, and the vast majority--more than 97 percent--are not cancer (benign). Most are normal lymph nodes or small areas of scarring from past infections.  But, if a small lung nodule is found to  be cancer, the cancer can be cured more than 90 percent of the time. To know if the nodule is cancer, we may need to get more images before your next yearly screening exam. If the nodule has suspicious features (for example, it is large, has an odd shape or grows over time), we will refer you to a specialist for further testing.  Will my doctor also get the results?  Yes. Your doctor will get a copy of your results.  Is it okay to keep smoking now that there s a cancer screening exam?  No. Tobacco is one of the strongest cancer-causing agents. It causes not only lung cancer, but other cancers and cardiovascular (heart) diseases as well. The damage caused by smoking builds over time. This means that the longer you smoke, the higher your risk of disease. While it is never too late to quit, the sooner you quit, the better.  Where can I find help to quit smoking?  The best way to prevent lung cancer is to stop smoking. If you have already quit smoking, congratulations and keep it up! For help on quitting smoking, please call [employer] at 3-459-QUITNOW (1-255.909.5090) or the American [employer] Society at 1-732.157.8195 to find local resources near you.  One-on-one health coaching:  If you d prefer to work individually with a health care provider on tobacco cessation, we offer:     Medication Therapy Management:  Our specially trained pharmacists work closely with you and your doctor to help you quit smoking.  Call [phone] or [phone] (toll free).

## 2023-04-26 NOTE — PROGRESS NOTES
SUBJECTIVE:   CC: Jerald is an 89 year old who presents for preventative health visit.       4/26/2023     2:45 PM   Additional Questions   Roomed by Minoo DAWSON   Accompanied by self         4/26/2023     2:45 PM   Patient Reported Additional Medications   Patient reports taking the following new medications none   Patient has been advised of split billing requirements and indicates understanding: Yes  Healthy Habits:     Getting at least 3 servings of Calcium per day:  NO    Bi-annual eye exam:  Yes    Dental care twice a year:  Yes    Sleep apnea or symptoms of sleep apnea:  Daytime drowsiness    Diet:  Regular (no restrictions)    Frequency of exercise:  None    Taking medications regularly:  Yes    Medication side effects:  None    PHQ-2 Total Score: 0    Additional concerns today:  No    Patient said the daytime drowsiness is primarily just napping more frequently since he retired and has not been doing much.    CHEST PAIN     Onset: noticed winter of 2021, would happen when walking outside when cold outside, now is noticing when walking even with the warmer weather, will notice also if laying on his side while sleeping    Description:   Location:  Center of chest  Character: achey  Radiation: will feel some achiness in his forearms at the same time  Duration: continues while walking, will stop if he stops walking     Intensity: 5/10 at worst    Progression of Symptoms:  worsening    Accompanying Signs & Symptoms:  Shortness of breath: no  Sweating: no  Nausea/vomiting: no  Lightheadedness: no  Palpitations: No  Fever/Chills: no  Cough: no  Heartburn: no    History:   Family history of heart disease YES  Tobacco use: YES- just quit smoking January 1, 2023. Smoked since 18 yrs old - averaged 1 ppd.     Precipitating factors:   Worse with exertion: YES, occurs only when walking  Worse with deep breaths :  no  Related to food: no    Alleviating factors:  Stopping when walking  Patient has not had lung cancer  screening.  Patient has not had cardiac testing.     Therapies Tried and outcome: none      Today's PHQ-2 Score:       4/25/2023     8:18 PM   PHQ-2 ( 1999 [employer])   Q1: Little interest or pleasure in doing things 0   Q2: Feeling down, depressed or hopeless 0   PHQ-2 Score 0   Q1: Little interest or pleasure in doing things Not at all   Q2: Feeling down, depressed or hopeless Not at all   PHQ-2 Score 0       Have you ever done Advance Care Planning? (For example, a Health Directive, POLST, or a discussion with a medical provider or your loved ones about your wishes): No, advance care planning information given to patient to review.  Patient plans to discuss their wishes with loved ones or provider.      Social History     Tobacco Use     Smoking status: Former     Packs/day: 0.75     Years: 35.00     Pack years: 26.25     Types: Cigarettes     Start date: 1/1/2023     Smokeless tobacco: Former   Vaping Use     Vaping status: Never Used   Substance Use Topics     Alcohol use: No             4/25/2023     8:17 PM   Alcohol Use   Prescreen: >3 drinks/day or >7 drinks/week? No          View : No data to display.                Last PSA:   PSA   Date Value Ref Range Status   05/14/2018 1.09 0 - 4 ug/L Final     Comment:     Assay Method:  Chemiluminescence using Siemens Vista analyzer       Reviewed orders with patient. Reviewed health maintenance and updated orders accordingly - Yes  BP Readings from Last 3 Encounters:   04/26/23 128/76   05/14/18 125/84   08/10/17 103/72    Wt Readings from Last 3 Encounters:   04/26/23 90.2 kg (198 lb 12.8 oz)   05/14/18 91.2 kg (201 lb)   08/10/17 88.6 kg (195 lb 6.4 oz)                  Patient Active Problem List   Diagnosis     CARDIOVASCULAR SCREENING; LDL GOAL LESS THAN 130     Tobacco abuse     Thyroid nodule     Mixed hyperlipidemia     No past surgical history on file.    Social History     Tobacco Use     Smoking status: Former     Packs/day: 0.75     Years: 35.00     Pack  years: 26.25     Types: Cigarettes     Start date: 1/1/2023     Smokeless tobacco: Former   Vaping Use     Vaping status: Never Used   Substance Use Topics     Alcohol use: No     Family History   Problem Relation Age of Onset     Cancer - colorectal Father      C.A.D. Father      Blood Disease Brother      Cardiovascular Brother      C.A.D. Brother          Current Outpatient Medications   Medication Sig Dispense Refill     aspirin 81 MG EC tablet Take 81 mg by mouth daily       buPROPion (ZYBAN) 150 MG 12 hr tablet Take 1 tablet (150 mg) by mouth 2 times daily 60 tablet 0     nicotine (NICODERM CQ) 21 MG/24HR 24 hr patch Place 1 patch onto the skin every 24 hours 30 patch 0     NO ACTIVE MEDICATIONS None Entered       No Known Allergies    Reviewed and updated as needed this visit by clinical staff   Tobacco  Allergies  Meds              Reviewed and updated as needed this visit by Provider     Meds             Past Medical History:   Diagnosis Date     Unspecified part of closed fracture of clavicle     comminuted displaced fx of left clavicle      No past surgical history on file.    Review of Systems   Constitutional: Negative for chills and fever.   HENT: Positive for hearing loss. Negative for congestion, ear pain and sore throat.    Eyes: Negative for pain and visual disturbance.   Respiratory: Negative for cough and shortness of breath.    Cardiovascular: Positive for chest pain. Negative for palpitations and peripheral edema.   Gastrointestinal: Negative for abdominal pain, constipation, diarrhea, heartburn, hematochezia and nausea.   Genitourinary: Negative for dysuria, frequency, genital sores, hematuria, impotence, penile discharge and urgency.   Musculoskeletal: Negative for arthralgias, joint swelling and myalgias.   Skin: Negative for rash.   Neurological: Negative for dizziness, weakness, headaches and paresthesias.   Psychiatric/Behavioral: Positive for mood changes. The patient is not  "nervous/anxious.      Patient denies weight loss or gain.        OBJECTIVE:   /76 (BP Location: Left arm, Patient Position: Chair, Cuff Size: Adult Regular)   Pulse 61   Temp 97.2  F (36.2  C) (Tympanic)   Resp 16   Ht 1.791 m (5' 10.5\")   Wt 90.2 kg (198 lb 12.8 oz)   SpO2 98%   BMI 28.12 kg/m      Physical Exam  GENERAL APPEARANCE: overweight,  alert and no distress  EYES: pink conj, no icterus, PERRL, EOMI  HENT: ear canals and TM's normal, nose and mouth without ulcers or lesions, oropharynx clear and oral mucous membranes moist  NECK: no adenopathy, no asymmetry, masses, or scars and thyroid normal to palpation  RESP: lungs clear to auscultation - no rales, rhonchi or wheezes  CV: regular rates and rhythm, normal S1 S2, no S3 or S4, no murmur, click or rub, no peripheral edema and peripheral pulses strong  ABDOMEN: soft, nontender, no hepatosplenomegaly, no masses and bowel sounds normal  RECTAL: normal sphincter tone, no rectal masses, prostate slightly enlarged but smooth, soft and nontender  MS: no musculoskeletal defects are noted and gait is age appropriate without ataxia  SKIN: no suspicious lesions or rashes  NEURO: Normal strength and tone, sensory exam grossly normal, mentation intact and speech normal    Diagnostic Test Results:  CXR - no cardiomegaly per my assessment; final radiology reading pending  EKG - sinus rhythm, LVH by voltage criteria, T inversion on V2-V4, no ST-T elevation or depression to suspect acute ischemia, compared to last EKG in 2014 significant changes as decribed.    ASSESSMENT/PLAN:   Jerald was seen today for physical.    Diagnoses and all orders for this visit:    Routine general medical examination at a health care facility  Patient was advised on recommended screening and preventive health recommendations.  He verbalized understanding and agreed to the plans below.    Recurrent chest pain  -     EKG 12-lead complete w/read - Clinics  -     XR Chest 2 Views  -    "  NM Lexiscan stress test; Future  -     CBC with Platelets & Differential; Future  Chronic per patient history. Patient verifies does not occur at rest. Only difference now is that it still occurs even if the weather is warmer. Patient thought it only occurs if it's too cold.  EKG findings discussed with patient - new LVH and T wave inversions. Patient asymptomatic at time ofvisit.  However, since patient reported chest pain when he walked the dog earlier today, this provider conferred with ER provider. He advised likely, not an acute event considering has been recurrent over the last several months. After discussion, we also suggested patient does not give the picture of unstable angina.  He recommended that shared decision making be brought to patient: ER staff will be glad to see patient now and rule out ACS, vs if patient prefers not to go to ER, he may be sent home with detailed precautions and refer for cardiac testing.   Patient was advised of the above. He verified he does not experience resting chest pain nor increase in frequency of pain. He preferred to go home.  Advised patient in detail reasons to call 911 or have himself brought to the ER.   Ordered cardiac stress testing.    Left ventricular hypertrophy by electrocardiogram  -     XR Chest 2 Views  -     NM Lexiscan stress test; Future  Advised patient EKG changes from previous.  See above for plan.  CXR obtained which showed no cardiomegaly or acute infiltrates or effusion.  Not hypertensive so no indicatio for firther BP control.  Pursue cardiac testing first. May consider following that up with echo, and likely referral to cardiology.    Mixed hyperlipidemia  -     Lipid panel reflex to direct LDL Fasting; Future  -     Comprehensive metabolic panel; Future  Reinforced heart healthy lifestyle.    Screen for colon cancer  -     Colonoscopy Screening  Referral; Future    Personal history of tobacco use  -     Prof fee: Shared Decision Making  for Lung Cancer Screening  -     CT Chest Lung Cancer Scrn Low Dose wo; Future    Other orders  -     REVIEW OF HEALTH MAINTENANCE PROTOCOL ORDERS  -     Pneumococcal 20 Valent Conjugate (Prevnar 20)        Patient has been advised of split billing requirements and indicates understanding: Yes      COUNSELING:   Reviewed preventive health counseling, as reflected in patient instructions        He reports that he has quit smoking. His smoking use included cigarettes. He started smoking about 3 months ago. He has a 26.25 pack-year smoking history. He has quit using smokeless tobacco.        Skip Rubio MD  [facility]  Lung Cancer Screening Shared Decision Making Visit     Jerald Lawrence, a 62 year old male, is eligible for lung cancer screening    History   Smoking Status     Former     Packs/day: 0.75     Years: 35.00     Types: Cigarettes     Start date: 1/1/2023   Smokeless Tobacco     Former       I have discussed with patient the risks and benefits of screening for lung cancer with low-dose CT.     The risks include:    radiation exposure: one low dose chest CT has as much ionizing radiation as about 15 chest x-rays, or 6 months of background radiation living in [place]      false positives: most findings/nodules are NOT cancer, but some might still require additional diagnostic evaluation, including biopsy    over-diagnosis: some slow growing cancers that might never have been clinically significant will be detected and treated unnecessarily     The benefit of early detection of lung cancer is contingent upon adherence to annual screening or more frequent follow up if indicated.     Furthermore, to benefit from screening, Jerald must be willing and able to undergo diagnostic procedures, if indicated. Although no specific guide is available for determining severity of comorbidities, it is reasonable to withhold screening in patients who have greater mortality risk from other  diseases.     We did discuss that the best way to prevent lung cancer is to not smoke.    Some patients may value a numeric estimation of lung cancer risk when evaluating if lung cancer screening is right for them, here is one calculator:    ShouldIScreen

## 2023-04-26 NOTE — NURSING NOTE
Prior to immunization administration, verified patients identity using patient s name and date of birth. Please see Immunization Activity for additional information.     Screening Questionnaire for Adult Immunization    Are you sick today?   No   Do you have allergies to medications, food, a vaccine component or latex?   No   Have you ever had a serious reaction after receiving a vaccination?   No   Do you have a long-term health problem with heart, lung, kidney, or metabolic disease (e.g., diabetes), asthma, a blood disorder, no spleen, complement component deficiency, a cochlear implant, or a spinal fluid leak?  Are you on long-term aspirin therapy?   No   Do you have cancer, leukemia, HIV/AIDS, or any other immune system problem?   No   Do you have a parent, brother, or sister with an immune system problem?   No   In the past 3 months, have you taken medications that affect  your immune system, such as prednisone, other steroids, or anticancer drugs; drugs for the treatment of rheumatoid arthritis, Crohn s disease, or psoriasis; or have you had radiation treatments?   No   Have you had a seizure, or a brain or other nervous system problem?   No   During the past year, have you received a transfusion of blood or blood    products, or been given immune (gamma) globulin or antiviral drug?   No   For women: Are you pregnant or is there a chance you could become       pregnant during the next month?   No   Have you received any vaccinations in the past 4 weeks?   No     Immunization questionnaire answers were all negative.      Injection of prevnar 20 given by Minoo Manning CMA. Patient instructed to remain in clinic for 15 minutes afterwards, and to report any adverse reactions.     Screening performed by Minoo Manning CMA on 4/26/2023 at 4:03 PM.

## 2023-04-28 ENCOUNTER — HOSPITAL ENCOUNTER (OUTPATIENT)
Dept: CT IMAGING | Facility: CLINIC | Age: 63
Discharge: HOME | End: 2023-04-28
Attending: FAMILY MEDICINE | Admitting: FAMILY MEDICINE
Payer: COMMERCIAL

## 2023-04-28 DIAGNOSIS — Z87.891 PERSONAL HISTORY OF TOBACCO USE: ICD-10-CM

## 2023-04-28 PROCEDURE — 71271 CT THORAX LUNG CANCER SCR C-: CPT

## 2023-05-03 ENCOUNTER — LAB (OUTPATIENT)
Dept: LAB | Facility: CLINIC | Age: 63
End: 2023-05-03
Payer: COMMERCIAL

## 2023-05-03 DIAGNOSIS — E78.2 MIXED HYPERLIPIDEMIA: ICD-10-CM

## 2023-05-03 DIAGNOSIS — R07.9 RECURRENT CHEST PAIN: ICD-10-CM

## 2023-05-03 LAB
ALBUMIN SERPL BCG-MCNC: 4 G/DL (ref 3.5–5.2)
ALP SERPL-CCNC: 96 U/L (ref 40–129)
ALT SERPL W P-5'-P-CCNC: 17 U/L (ref 10–50)
ANION GAP SERPL CALCULATED.3IONS-SCNC: 5 MMOL/L (ref 7–15)
AST SERPL W P-5'-P-CCNC: 21 U/L (ref 10–50)
BASOPHILS # BLD AUTO: 0.1 10E3/UL (ref 0–0.2)
BASOPHILS NFR BLD AUTO: 1 %
BILIRUB SERPL-MCNC: 0.4 MG/DL
BUN SERPL-MCNC: 14.1 MG/DL (ref 8–23)
CALCIUM SERPL-MCNC: 9.6 MG/DL (ref 8.8–10.2)
CHLORIDE SERPL-SCNC: 105 MMOL/L (ref 98–107)
CHOLEST SERPL-MCNC: 215 MG/DL
CREAT SERPL-MCNC: 0.97 MG/DL (ref 0.67–1.17)
DEPRECATED HCO3 PLAS-SCNC: 31 MMOL/L (ref 22–29)
EOSINOPHIL # BLD AUTO: 0.5 10E3/UL (ref 0–0.7)
EOSINOPHIL NFR BLD AUTO: 6 %
ERYTHROCYTE [DISTWIDTH] IN BLOOD BY AUTOMATED COUNT: 12.1 % (ref 10–15)
GFR SERPL CREATININE-BSD FRML MDRD: 89 ML/MIN/1.73M2
GLUCOSE SERPL-MCNC: 88 MG/DL (ref 70–99)
HCT VFR BLD AUTO: 45 % (ref 40–53)
HDLC SERPL-MCNC: 50 MG/DL
HGB BLD-MCNC: 15 G/DL (ref 13.3–17.7)
IMM GRANULOCYTES # BLD: 0 10E3/UL
IMM GRANULOCYTES NFR BLD: 0 %
LDLC SERPL CALC-MCNC: 139 MG/DL
LYMPHOCYTES # BLD AUTO: 2.4 10E3/UL (ref 0.8–5.3)
LYMPHOCYTES NFR BLD AUTO: 34 %
MCH RBC QN AUTO: 29.6 PG (ref 26.5–33)
MCHC RBC AUTO-ENTMCNC: 33.3 G/DL (ref 31.5–36.5)
MCV RBC AUTO: 89 FL (ref 78–100)
MONOCYTES # BLD AUTO: 0.7 10E3/UL (ref 0–1.3)
MONOCYTES NFR BLD AUTO: 10 %
NEUTROPHILS # BLD AUTO: 3.5 10E3/UL (ref 1.6–8.3)
NEUTROPHILS NFR BLD AUTO: 49 %
NONHDLC SERPL-MCNC: 165 MG/DL
PLATELET # BLD AUTO: 298 10E3/UL (ref 150–450)
POTASSIUM SERPL-SCNC: 4.8 MMOL/L (ref 3.4–5.3)
PROT SERPL-MCNC: 6.7 G/DL (ref 6.4–8.3)
RBC # BLD AUTO: 5.06 10E6/UL (ref 4.4–5.9)
SODIUM SERPL-SCNC: 141 MMOL/L (ref 136–145)
TRIGL SERPL-MCNC: 129 MG/DL
WBC # BLD AUTO: 7.1 10E3/UL (ref 4–11)

## 2023-05-03 PROCEDURE — 80053 COMPREHEN METABOLIC PANEL: CPT

## 2023-05-03 PROCEDURE — 36415 COLL VENOUS BLD VENIPUNCTURE: CPT

## 2023-05-03 PROCEDURE — 85025 COMPLETE CBC W/AUTO DIFF WBC: CPT

## 2023-05-03 PROCEDURE — 80061 LIPID PANEL: CPT

## 2023-05-03 NOTE — RESULT ENCOUNTER NOTE
The 10-year ASCVD risk score (Ke LUI, et al., 2019) is: 9.9%    Values used to calculate the score:      Age: 62 years      Sex: Male      Is Non- : No      Diabetic: No      Tobacco smoker: No      Systolic Blood Pressure: 128 mmHg      Is BP treated: No      HDL Cholesterol: 50 mg/dL      Total Cholesterol: 215 mg/dL

## 2023-05-10 ENCOUNTER — HOSPITAL ENCOUNTER (OUTPATIENT)
Dept: NUCLEAR MEDICINE | Facility: CLINIC | Age: 63
Setting detail: NUCLEAR MEDICINE
Discharge: HOME | End: 2023-05-10
Attending: FAMILY MEDICINE
Payer: COMMERCIAL

## 2023-05-10 ENCOUNTER — HOSPITAL ENCOUNTER (OUTPATIENT)
Dept: CARDIOLOGY | Facility: CLINIC | Age: 63
Discharge: HOME | End: 2023-05-10
Attending: FAMILY MEDICINE
Payer: COMMERCIAL

## 2023-05-10 VITALS — HEIGHT: 70 IN | BODY MASS INDEX: 28.35 KG/M2 | WEIGHT: 198 LBS

## 2023-05-10 DIAGNOSIS — R07.9 RECURRENT CHEST PAIN: ICD-10-CM

## 2023-05-10 DIAGNOSIS — R07.9 RECURRENT CHEST PAIN: Primary | ICD-10-CM

## 2023-05-10 DIAGNOSIS — R94.39 ABNORMAL NUCLEAR STRESS TEST: ICD-10-CM

## 2023-05-10 DIAGNOSIS — I51.7 LEFT VENTRICULAR HYPERTROPHY BY ELECTROCARDIOGRAM: ICD-10-CM

## 2023-05-10 LAB
CV BLOOD PRESSURE: 35 MMHG
CV STRESS MAX HR HE: 144
NUC STRESS EJECTION FRACTION: 35 %
RATE PRESSURE PRODUCT: NORMAL
STRESS ANGINA INDEX: 0
STRESS ECHO BASELINE DIASTOLIC HE: 94
STRESS ECHO BASELINE HR: 57 BPM
STRESS ECHO BASELINE SYSTOLIC BP: 158
STRESS ECHO CALCULATED PERCENT HR: 91 %
STRESS ECHO LAST STRESS DIASTOLIC BP: 84
STRESS ECHO LAST STRESS SYSTOLIC BP: 190
STRESS ECHO POST ESTIMATED WORKLOAD: 10.1 METS
STRESS ECHO POST EXERCISE DUR MIN: 8 MIN
STRESS ECHO POST EXERCISE DUR SEC: 11 SEC
STRESS ECHO TARGET HR: 159

## 2023-05-10 PROCEDURE — 93017 CV STRESS TEST TRACING ONLY: CPT

## 2023-05-10 PROCEDURE — 93016 CV STRESS TEST SUPVJ ONLY: CPT | Performed by: INTERNAL MEDICINE

## 2023-05-10 PROCEDURE — 93018 CV STRESS TEST I&R ONLY: CPT | Performed by: INTERNAL MEDICINE

## 2023-05-10 PROCEDURE — 343N000001 HC RX 343: Performed by: FAMILY MEDICINE

## 2023-05-10 PROCEDURE — A9502 TC99M TETROFOSMIN: HCPCS | Performed by: FAMILY MEDICINE

## 2023-05-10 PROCEDURE — 78452 HT MUSCLE IMAGE SPECT MULT: CPT | Mod: 26 | Performed by: INTERNAL MEDICINE

## 2023-05-10 PROCEDURE — 78452 HT MUSCLE IMAGE SPECT MULT: CPT

## 2023-05-10 RX ADMIN — TETROFOSMIN 31.3 MILLICURIE: 1.38 INJECTION, POWDER, LYOPHILIZED, FOR SOLUTION INTRAVENOUS at 09:58

## 2023-05-10 RX ADMIN — TETROFOSMIN 9.7 MILLICURIE: 1.38 INJECTION, POWDER, LYOPHILIZED, FOR SOLUTION INTRAVENOUS at 08:15

## 2023-05-10 NOTE — RESULT ENCOUNTER NOTE
"This message was sent to [employer] as well.      However, please try to call patient now to make sure he receives recommendations and precautions.    \"Your heart stress test showed coronary artery disease with weakness of the heart muscle contraction. Due to this, I placed an urgent referral to cardiology. The  is expected to call you in 24 hours. If you do not receive a call by erectile dysfunction of May 11th, contact the care team promptly.    If you do experience recurrence of your chest pain at anytime, call 911 or have yourself brought to the ER immediately.    Keep taking aspirin 81 mg daily.\"  "

## 2023-05-25 ENCOUNTER — OFFICE VISIT (OUTPATIENT)
Dept: CARDIOLOGY | Facility: CLINIC | Age: 63
End: 2023-05-25
Attending: FAMILY MEDICINE
Payer: COMMERCIAL

## 2023-05-25 ENCOUNTER — HOSPITAL ENCOUNTER (OUTPATIENT)
Dept: CARDIOLOGY | Facility: CLINIC | Age: 63
Discharge: HOME | End: 2023-05-25
Attending: INTERNAL MEDICINE | Admitting: INTERNAL MEDICINE
Payer: COMMERCIAL

## 2023-05-25 ENCOUNTER — CARE COORDINATION (OUTPATIENT)
Dept: CARDIOLOGY | Facility: CLINIC | Age: 63
End: 2023-05-25

## 2023-05-25 VITALS
HEART RATE: 77 BPM | OXYGEN SATURATION: 98 % | SYSTOLIC BLOOD PRESSURE: 102 MMHG | DIASTOLIC BLOOD PRESSURE: 70 MMHG | WEIGHT: 196.6 LBS | BODY MASS INDEX: 28.21 KG/M2

## 2023-05-25 DIAGNOSIS — I51.7 LEFT VENTRICULAR HYPERTROPHY BY ELECTROCARDIOGRAM: ICD-10-CM

## 2023-05-25 DIAGNOSIS — R94.39 ABNORMAL NUCLEAR STRESS TEST: ICD-10-CM

## 2023-05-25 DIAGNOSIS — R07.9 RECURRENT CHEST PAIN: Primary | ICD-10-CM

## 2023-05-25 DIAGNOSIS — R07.9 RECURRENT CHEST PAIN: ICD-10-CM

## 2023-05-25 LAB — LVEF ECHO: NORMAL

## 2023-05-25 PROCEDURE — 93306 TTE W/DOPPLER COMPLETE: CPT | Mod: 26 | Performed by: INTERNAL MEDICINE

## 2023-05-25 PROCEDURE — 255N000002 HC RX 255 OP 636: Performed by: INTERNAL MEDICINE

## 2023-05-25 PROCEDURE — 99204 OFFICE O/P NEW MOD 45 MIN: CPT | Mod: 25 | Performed by: INTERNAL MEDICINE

## 2023-05-25 PROCEDURE — 999N000208 ECHOCARDIOGRAM COMPLETE

## 2023-05-25 RX ORDER — LIDOCAINE 40 MG/G
CREAM TOPICAL
Status: CANCELLED | OUTPATIENT
Start: 2023-05-25

## 2023-05-25 RX ORDER — SODIUM CHLORIDE 9 MG/ML
INJECTION, SOLUTION INTRAVENOUS CONTINUOUS
Status: CANCELLED | OUTPATIENT
Start: 2023-05-25

## 2023-05-25 RX ORDER — METOPROLOL SUCCINATE 25 MG/1
12.5 TABLET, EXTENDED RELEASE ORAL DAILY
Qty: 30 TABLET | Refills: 3 | Status: ON HOLD | OUTPATIENT
Start: 2023-05-25 | End: 2023-07-04

## 2023-05-25 RX ORDER — MULTIVITAMIN
1 TABLET ORAL DAILY
COMMUNITY

## 2023-05-25 RX ORDER — ROSUVASTATIN CALCIUM 20 MG/1
20 TABLET, COATED ORAL DAILY
Qty: 90 TABLET | Refills: 3 | Status: SHIPPED | OUTPATIENT
Start: 2023-05-25 | End: 2023-12-20

## 2023-05-25 RX ORDER — NITROGLYCERIN 0.4 MG/1
TABLET SUBLINGUAL
Qty: 10 TABLET | Refills: 3 | Status: ON HOLD | OUTPATIENT
Start: 2023-05-25 | End: 2023-07-04

## 2023-05-25 RX ADMIN — HUMAN ALBUMIN MICROSPHERES AND PERFLUTREN 2 ML: 10; .22 INJECTION, SOLUTION INTRAVENOUS at 12:39

## 2023-05-25 NOTE — LETTER
5/25/2023    Skip Rubio MD  [address] [facility] [address]    RE: Jerald Lawrence       Dear Colleague,     I had the pleasure of seeing Jerald Lawrence in the [facility].  CARDIOLOGY CLINIC CONSULTATION    PRIMARY CARE PHYSICIAN:  Skip Rubio    HISTORY OF PRESENT ILLNESS:  This is a very pleasant 62-year-old gentleman who is seeing me as a new patient consultation at [facility].  He is here with his wife Whitley.  The patient has a history of smoking that he quit in January of this year.  He has a family history of coronary artery disease.    The patient is seen in cardiovascular consultation because for the last few months he has been experiencing exertional chest discomfort and heaviness and chest pain.  This has been increasing in intensity and frequency.  Over the last 1 month he has had multiple episodes of chest discomfort but only with exertion.  He has not had the symptoms at rest.  Walking from the parking lot to the clinic he did not have symptoms.    PCP appropriately ordered a nuclear stress test which showed LV dysfunction with an EF of 35%.  LAD territory ischemia was noted on that study.    The only medication the patient currently takes is aspirin 81 mg daily.  He has baseline sinus bradycardia with heart rates in the 60s.  Denies syncope presyncope heart failure symptoms.    PAST MEDICAL HISTORY:  Past Medical History:   Diagnosis Date    Unspecified part of closed fracture of clavicle     comminuted displaced fx of left clavicle       MEDICATIONS:  Current Outpatient Medications   Medication    aspirin 81 MG EC tablet    metoprolol succinate ER (TOPROL XL) 25 MG 24 hr tablet    Multiple Vitamin (MULTI-VITAMIN DAILY PO)    nitroGLYcerin (NITROSTAT) 0.4 MG sublingual tablet    rosuvastatin (CRESTOR) 20 MG tablet     No current facility-administered medications for this visit.       SOCIAL HISTORY:  I have reviewed this patient's  social history and updated it with pertinent information if needed. Jerald Lawrence  reports that he has quit smoking. His smoking use included cigarettes. He started smoking about 4 months ago. He has a 26.25 pack-year smoking history. He has never been exposed to tobacco smoke. He has quit using smokeless tobacco. He reports that he does not drink alcohol and does not use drugs.    PHYSICAL EXAM:  Pulse:  [77] 77  BP: (102)/(70) 102/70  SpO2:  [98 %] 98 %  196 lbs 9.6 oz    Constitutional: alert, no distress  Respiratory: Good bilateral air entry  Cardiovascular: Normal regular heart sounds with a soft systolic murmur JVP is normal there is no edema pulses are equal bilaterally  GI: nondistended  Neuropsychiatric: appropriate affact    ASSESSMENT: Pertinent issues addressed/ reviewed during this cardiology visit  Aricndo progressive angina  Abnormal stress test with anterior ischemia  History of smoking  LV dysfunction noted on nuclear study    RECOMMENDATIONS:  The patient's symptoms are highly typical for obstructive coronary artery disease and as suspected, his nuclear study is showing anterior ischemia concerning for left main or LAD disease.  Symptoms are increasing in intensity and frequency although he has not had any resting symptoms.  At this time I recommend 81 mg of aspirin.  As needed nitroglycerin has been provided.  Start Crestor 20 mg daily along with metoprolol succinate 12.5 mg daily.  Given lowish blood pressure and borderline heart rate, I have told him to watch for dizziness and if that happens we may have to stop metoprolol.  I have told him to completely avoid heavy exertion until further work-up as mentioned below is completed.  If any symptoms especially addressed, he needs to call 911 and come into the hospital.  I recommend getting a echocardiogram and invasive coronary angiogram to define coronary anatomy with intent to revascularize.  He understands he will need dual antiplatelet  therapy if he undergoes PCI.    I will have my nursing team set up urgent coronary angiography and echocardiography in the coming week.  Until then, no significant physical exertion and call with any change in clinical status in the interim.    It was a pleasure seeing this patient in clinic today. Please do not hesitate to contact me with any future questions.     TYRELL Choi, [facility]  Cardiology - [facility]  May 25, 2023    Review of the result(s) of each unique test - Last lipid CBC BMP nuclear stress test. Last ECG shows sinus bradycardia with T wave inversions.     The level of medical decision making during this visit was of high complexity. Condition for which patient was treated that is considered severe and would require intensive monitoring or urgent treatment to facilitate care -abnormal stress test with progressive angina and need for urgent coronary angiography    This note was completed in part using dictation via the Dragon voice recognition software. Some word and grammatical errors may occur and must be interpreted in the appropriate clinical context.  If there are any questions pertaining to this issue, please contact me for further clarification.      Thank you for allowing me to participate in the care of your patient.      Sincerely,     Flavia Ibarra MD     [facility]  cc:   Skip Rubio MD  [address] [place] [address]

## 2023-05-25 NOTE — PROGRESS NOTES
CARDIOLOGY CLINIC CONSULTATION    PRIMARY CARE PHYSICIAN:  Skip Rubio    HISTORY OF PRESENT ILLNESS:  This is a very pleasant 62-year-old gentleman who is seeing me as a new patient consultation at [facility].  He is here with his wife Whitley.  The patient has a history of smoking that he quit in January of this year.  He has a family history of coronary artery disease.    The patient is seen in cardiovascular consultation because for the last few months he has been experiencing exertional chest discomfort and heaviness and chest pain.  This has been increasing in intensity and frequency.  Over the last 1 month he has had multiple episodes of chest discomfort but only with exertion.  He has not had the symptoms at rest.  Walking from the parking lot to the clinic he did not have symptoms.    PCP appropriately ordered a nuclear stress test which showed LV dysfunction with an EF of 35%.  LAD territory ischemia was noted on that study.    The only medication the patient currently takes is aspirin 81 mg daily.  He has baseline sinus bradycardia with heart rates in the 60s.  Denies syncope presyncope heart failure symptoms.    PAST MEDICAL HISTORY:  Past Medical History:   Diagnosis Date     Unspecified part of closed fracture of clavicle     comminuted displaced fx of left clavicle       MEDICATIONS:  Current Outpatient Medications   Medication     aspirin 81 MG EC tablet     metoprolol succinate ER (TOPROL XL) 25 MG 24 hr tablet     Multiple Vitamin (MULTI-VITAMIN DAILY PO)     nitroGLYcerin (NITROSTAT) 0.4 MG sublingual tablet     rosuvastatin (CRESTOR) 20 MG tablet     No current facility-administered medications for this visit.       SOCIAL HISTORY:  I have reviewed this patient's social history and updated it with pertinent information if needed. Jerald Lawrence  reports that he has quit smoking. His smoking use included cigarettes. He started smoking about 4 months ago. He has a 26.25  pack-year smoking history. He has never been exposed to tobacco smoke. He has quit using smokeless tobacco. He reports that he does not drink alcohol and does not use drugs.    PHYSICAL EXAM:  Pulse:  [77] 77  BP: (102)/(70) 102/70  SpO2:  [98 %] 98 %  196 lbs 9.6 oz    Constitutional: alert, no distress  Respiratory: Good bilateral air entry  Cardiovascular: Normal regular heart sounds with a soft systolic murmur JVP is normal there is no edema pulses are equal bilaterally  GI: nondistended  Neuropsychiatric: appropriate affact    ASSESSMENT: Pertinent issues addressed/ reviewed during this cardiology visit  Dara progressive angina  Abnormal stress test with anterior ischemia  History of smoking  LV dysfunction noted on nuclear study    RECOMMENDATIONS:  1. The patient's symptoms are highly typical for obstructive coronary artery disease and as suspected, his nuclear study is showing anterior ischemia concerning for left main or LAD disease.  2. Symptoms are increasing in intensity and frequency although he has not had any resting symptoms.  3. At this time I recommend 81 mg of aspirin.  As needed nitroglycerin has been provided.  Start Crestor 20 mg daily along with metoprolol succinate 12.5 mg daily.  Given lowish blood pressure and borderline heart rate, I have told him to watch for dizziness and if that happens we may have to stop metoprolol.  4. I have told him to completely avoid heavy exertion until further work-up as mentioned below is completed.  If any symptoms especially addressed, he needs to call 911 and come into the hospital.  5. I recommend getting a echocardiogram and invasive coronary angiogram to define coronary anatomy with intent to revascularize.  He understands he will need dual antiplatelet therapy if he undergoes PCI.    I will have my nursing team set up urgent coronary angiography and echocardiography in the coming week.  Until then, no significant physical exertion and call with any  change in clinical status in the interim.    It was a pleasure seeing this patient in clinic today. Please do not hesitate to contact me with any future questions.     TYRELL Choi, [facility]  Cardiology - [facility] [[email or web address]] Heart  May 25, 2023    Review of the result(s) of each unique test - Last lipid CBC BMP nuclear stress test. Last ECG shows sinus bradycardia with T wave inversions.     The level of medical decision making during this visit was of high complexity. Condition for which patient was treated that is considered severe and would require intensive monitoring or urgent treatment to facilitate care -abnormal stress test with progressive angina and need for urgent coronary angiography    This note was completed in part using dictation via the Dragon voice recognition software. Some word and grammatical errors may occur and must be interpreted in the appropriate clinical context.  If there are any questions pertaining to this issue, please contact me for further clarification.

## 2023-05-25 NOTE — PATIENT INSTRUCTIONS
"Heart catheterization  [facility], [place]      Please call clinic with any new COVID like symptoms prior to procedure.    2.   Take your temperature the morning of the procedure. If it is >100 call the Care Suites at [phone]    3.   Coronary angiogram to be done at [facility] on 5/31/23. Please arrive at 7:30am . If you need to contact [facility] for any reason, please call [phone] option #2.    4.    Follow up with Marian Aguilar CNP  at 4:15pm via virtual visit on 6/9/23    In preparation for your procedure, we require that you do the following:    NO solid foods 8 hours prior to arrival and may have clear liquids up to 2 hours prior to arrival.    Take your usual morning medications with a small sip of water immediately upon arising on the morning of your procedure unless outlined below:    Aspirin:    If you are currently taking 81mg aspirin daily, please take an extra 4 tablets (total of 325mg)  the morning of procedure    You will be unable to drive after your procedure; please arrange to have someone drive you home. Make sure that there is a responsible adult with you for 24 hours after your procedure. Your procedure will be cancelled if you do not have transportation home or someone staying with you for 24 hrs.    Your procedure will be done at [facility] located at [address] [place] [id number]. Please park in the  Skyway Ramp  on the west side of [facility] on [address]. Take the skyway over [facility] to the hospital. Please check in on the first floor, \"Skyway Welcome Desk\" which is one floor down from the skyway level.    If you have any questions about your upcoming procedure, please contact nursing at [facility] Cardiology clinic at [phone] or at [facility] at [phone].  "

## 2023-05-25 NOTE — PROGRESS NOTES
See AVS from visit today 5/25/23 with Dr. Ibarra. All instructions and directions reviewed with pt and all questions answered. Pt verbalized an understanding.     Kelly Pop RN

## 2023-05-26 NOTE — RESULT ENCOUNTER NOTE
EF 43%; WMAs as described; grade I diastolic dysfunction; no valve disease. Cath 5/31/23 and follow up with Marian Aguilar NP on 6/9/23

## 2023-05-30 ENCOUNTER — TELEPHONE (OUTPATIENT)
Dept: MEDSURG UNIT | Facility: CLINIC | Age: 63
End: 2023-05-30
Payer: COMMERCIAL

## 2023-05-31 ENCOUNTER — APPOINTMENT (OUTPATIENT)
Dept: ULTRASOUND IMAGING | Facility: CLINIC | Age: 63
End: 2023-05-31
Attending: SURGERY
Payer: COMMERCIAL

## 2023-05-31 ENCOUNTER — HOSPITAL ENCOUNTER (OUTPATIENT)
Facility: CLINIC | Age: 63
Discharge: HOME | End: 2023-05-31
Admitting: INTERNAL MEDICINE
Payer: COMMERCIAL

## 2023-05-31 VITALS
HEART RATE: 62 BPM | OXYGEN SATURATION: 98 % | HEIGHT: 70 IN | TEMPERATURE: 98 F | SYSTOLIC BLOOD PRESSURE: 140 MMHG | DIASTOLIC BLOOD PRESSURE: 100 MMHG | BODY MASS INDEX: 28.2 KG/M2 | RESPIRATION RATE: 15 BRPM | WEIGHT: 197 LBS

## 2023-05-31 DIAGNOSIS — R94.39 ABNORMAL NUCLEAR STRESS TEST: ICD-10-CM

## 2023-05-31 DIAGNOSIS — I51.7 LEFT VENTRICULAR HYPERTROPHY BY ELECTROCARDIOGRAM: ICD-10-CM

## 2023-05-31 DIAGNOSIS — R07.9 RECURRENT CHEST PAIN: ICD-10-CM

## 2023-05-31 PROBLEM — Z98.890 STATUS POST CORONARY ANGIOGRAM: Status: ACTIVE | Noted: 2023-05-31

## 2023-05-31 LAB
ABO/RH(D): NORMAL
ALBUMIN SERPL BCG-MCNC: 3.8 G/DL (ref 3.5–5.2)
ALBUMIN UR-MCNC: NEGATIVE MG/DL
ALP SERPL-CCNC: 77 U/L (ref 40–129)
ALT SERPL W P-5'-P-CCNC: 20 U/L (ref 10–50)
ANION GAP SERPL CALCULATED.3IONS-SCNC: 8 MMOL/L (ref 7–15)
ANTIBODY SCREEN: NEGATIVE
APPEARANCE UR: CLEAR
APTT PPP: 26 SECONDS (ref 22–38)
AST SERPL W P-5'-P-CCNC: 26 U/L (ref 10–50)
BILIRUB DIRECT SERPL-MCNC: <0.2 MG/DL (ref 0–0.3)
BILIRUB SERPL-MCNC: 0.3 MG/DL
BILIRUB UR QL STRIP: NEGATIVE
BUN SERPL-MCNC: 12 MG/DL (ref 8–23)
CALCIUM SERPL-MCNC: 9.1 MG/DL (ref 8.8–10.2)
CATH EF QUANTITATIVE: 40 %
CHLORIDE SERPL-SCNC: 108 MMOL/L (ref 98–107)
COLOR UR AUTO: ABNORMAL
CREAT SERPL-MCNC: 0.81 MG/DL (ref 0.67–1.17)
DEPRECATED HCO3 PLAS-SCNC: 24 MMOL/L (ref 22–29)
ERYTHROCYTE [DISTWIDTH] IN BLOOD BY AUTOMATED COUNT: 12.7 % (ref 10–15)
GFR SERPL CREATININE-BSD FRML MDRD: >90 ML/MIN/1.73M2
GLUCOSE SERPL-MCNC: 98 MG/DL (ref 70–99)
GLUCOSE UR STRIP-MCNC: NEGATIVE MG/DL
HBA1C MFR BLD: 5.4 %
HCT VFR BLD AUTO: 40.4 % (ref 40–53)
HGB BLD-MCNC: 13.4 G/DL (ref 13.3–17.7)
HGB UR QL STRIP: NEGATIVE
HYALINE CASTS: 1 /LPF
INR PPP: 1.01 (ref 0.85–1.15)
KETONES UR STRIP-MCNC: NEGATIVE MG/DL
LEUKOCYTE ESTERASE UR QL STRIP: NEGATIVE
MCH RBC QN AUTO: 29.6 PG (ref 26.5–33)
MCHC RBC AUTO-ENTMCNC: 33.2 G/DL (ref 31.5–36.5)
MCV RBC AUTO: 89 FL (ref 78–100)
MUCOUS THREADS #/AREA URNS LPF: PRESENT /LPF
NITRATE UR QL: NEGATIVE
PH UR STRIP: 7 [PH] (ref 5–7)
PLATELET # BLD AUTO: 296 10E3/UL (ref 150–450)
POTASSIUM SERPL-SCNC: 4.1 MMOL/L (ref 3.4–5.3)
PROT SERPL-MCNC: 6.2 G/DL (ref 6.4–8.3)
RBC # BLD AUTO: 4.52 10E6/UL (ref 4.4–5.9)
RBC URINE: 1 /HPF
SODIUM SERPL-SCNC: 140 MMOL/L (ref 136–145)
SP GR UR STRIP: 1 (ref 1–1.03)
SPECIMEN EXPIRATION DATE: NORMAL
UROBILINOGEN UR STRIP-MCNC: NORMAL MG/DL
WBC # BLD AUTO: 8.1 10E3/UL (ref 4–11)
WBC URINE: 1 /HPF

## 2023-05-31 PROCEDURE — 250N000011 HC RX IP 250 OP 636: Performed by: INTERNAL MEDICINE

## 2023-05-31 PROCEDURE — 83036 HEMOGLOBIN GLYCOSYLATED A1C: CPT | Performed by: SURGERY

## 2023-05-31 PROCEDURE — 36415 COLL VENOUS BLD VENIPUNCTURE: CPT

## 2023-05-31 PROCEDURE — 999N000071 HC STATISTIC HEART CATH LAB OR EP LAB

## 2023-05-31 PROCEDURE — 93931 UPPER EXTREMITY STUDY: CPT | Mod: LT

## 2023-05-31 PROCEDURE — 258N000003 HC RX IP 258 OP 636: Performed by: INTERNAL MEDICINE

## 2023-05-31 PROCEDURE — 93971 EXTREMITY STUDY: CPT | Mod: LT

## 2023-05-31 PROCEDURE — 86850 RBC ANTIBODY SCREEN: CPT

## 2023-05-31 PROCEDURE — 93458 L HRT ARTERY/VENTRICLE ANGIO: CPT | Performed by: INTERNAL MEDICINE

## 2023-05-31 PROCEDURE — 36415 COLL VENOUS BLD VENIPUNCTURE: CPT | Performed by: INTERNAL MEDICINE

## 2023-05-31 PROCEDURE — 82310 ASSAY OF CALCIUM: CPT | Performed by: INTERNAL MEDICINE

## 2023-05-31 PROCEDURE — 93458 L HRT ARTERY/VENTRICLE ANGIO: CPT | Mod: 26 | Performed by: INTERNAL MEDICINE

## 2023-05-31 PROCEDURE — 85730 THROMBOPLASTIN TIME PARTIAL: CPT | Performed by: INTERNAL MEDICINE

## 2023-05-31 PROCEDURE — 80053 COMPREHEN METABOLIC PANEL: CPT | Performed by: INTERNAL MEDICINE

## 2023-05-31 PROCEDURE — 82248 BILIRUBIN DIRECT: CPT | Performed by: SURGERY

## 2023-05-31 PROCEDURE — 99152 MOD SED SAME PHYS/QHP 5/>YRS: CPT | Performed by: INTERNAL MEDICINE

## 2023-05-31 PROCEDURE — 99153 MOD SED SAME PHYS/QHP EA: CPT | Performed by: INTERNAL MEDICINE

## 2023-05-31 PROCEDURE — 272N000001 HC OR GENERAL SUPPLY STERILE: Performed by: INTERNAL MEDICINE

## 2023-05-31 PROCEDURE — 81001 URINALYSIS AUTO W/SCOPE: CPT | Performed by: SURGERY

## 2023-05-31 PROCEDURE — 85027 COMPLETE CBC AUTOMATED: CPT | Performed by: INTERNAL MEDICINE

## 2023-05-31 PROCEDURE — 85610 PROTHROMBIN TIME: CPT | Performed by: INTERNAL MEDICINE

## 2023-05-31 PROCEDURE — 999N000184 HC STATISTIC TELEMETRY

## 2023-05-31 PROCEDURE — 93880 EXTRACRANIAL BILAT STUDY: CPT

## 2023-05-31 PROCEDURE — 93005 ELECTROCARDIOGRAM TRACING: CPT

## 2023-05-31 PROCEDURE — 999N000054 HC STATISTIC EKG NON-CHARGEABLE

## 2023-05-31 PROCEDURE — 250N000009 HC RX 250: Performed by: INTERNAL MEDICINE

## 2023-05-31 RX ORDER — IOPAMIDOL 755 MG/ML
INJECTION, SOLUTION INTRAVASCULAR
Status: DISCONTINUED | OUTPATIENT
Start: 2023-05-31 | End: 2023-05-31 | Stop reason: HOSPADM

## 2023-05-31 RX ORDER — SODIUM CHLORIDE 9 MG/ML
INJECTION, SOLUTION INTRAVENOUS CONTINUOUS
Status: DISCONTINUED | OUTPATIENT
Start: 2023-05-31 | End: 2023-05-31 | Stop reason: HOSPADM

## 2023-05-31 RX ORDER — NALOXONE HYDROCHLORIDE 0.4 MG/ML
0.2 INJECTION, SOLUTION INTRAMUSCULAR; INTRAVENOUS; SUBCUTANEOUS
Status: DISCONTINUED | OUTPATIENT
Start: 2023-05-31 | End: 2023-05-31 | Stop reason: HOSPADM

## 2023-05-31 RX ORDER — ATROPINE SULFATE 0.1 MG/ML
0.5 INJECTION INTRAVENOUS
Status: DISCONTINUED | OUTPATIENT
Start: 2023-05-31 | End: 2023-05-31 | Stop reason: HOSPADM

## 2023-05-31 RX ORDER — FENTANYL CITRATE 50 UG/ML
INJECTION, SOLUTION INTRAMUSCULAR; INTRAVENOUS
Status: DISCONTINUED | OUTPATIENT
Start: 2023-05-31 | End: 2023-05-31 | Stop reason: HOSPADM

## 2023-05-31 RX ORDER — FLUMAZENIL 0.1 MG/ML
0.2 INJECTION, SOLUTION INTRAVENOUS
Status: DISCONTINUED | OUTPATIENT
Start: 2023-05-31 | End: 2023-05-31 | Stop reason: HOSPADM

## 2023-05-31 RX ORDER — OXYCODONE HYDROCHLORIDE 5 MG/1
10 TABLET ORAL EVERY 4 HOURS PRN
Status: DISCONTINUED | OUTPATIENT
Start: 2023-05-31 | End: 2023-05-31 | Stop reason: HOSPADM

## 2023-05-31 RX ORDER — LIDOCAINE 40 MG/G
CREAM TOPICAL
Status: DISCONTINUED | OUTPATIENT
Start: 2023-05-31 | End: 2023-05-31 | Stop reason: HOSPADM

## 2023-05-31 RX ORDER — ACETAMINOPHEN 325 MG/1
650 TABLET ORAL EVERY 4 HOURS PRN
Status: DISCONTINUED | OUTPATIENT
Start: 2023-05-31 | End: 2023-05-31 | Stop reason: HOSPADM

## 2023-05-31 RX ORDER — OXYCODONE HYDROCHLORIDE 5 MG/1
5 TABLET ORAL EVERY 4 HOURS PRN
Status: DISCONTINUED | OUTPATIENT
Start: 2023-05-31 | End: 2023-05-31 | Stop reason: HOSPADM

## 2023-05-31 RX ORDER — NALOXONE HYDROCHLORIDE 0.4 MG/ML
0.4 INJECTION, SOLUTION INTRAMUSCULAR; INTRAVENOUS; SUBCUTANEOUS
Status: DISCONTINUED | OUTPATIENT
Start: 2023-05-31 | End: 2023-05-31 | Stop reason: HOSPADM

## 2023-05-31 RX ORDER — FENTANYL CITRATE 50 UG/ML
25 INJECTION, SOLUTION INTRAMUSCULAR; INTRAVENOUS
Status: DISCONTINUED | OUTPATIENT
Start: 2023-05-31 | End: 2023-05-31 | Stop reason: HOSPADM

## 2023-05-31 RX ADMIN — SODIUM CHLORIDE: 9 INJECTION, SOLUTION INTRAVENOUS at 08:07

## 2023-05-31 ASSESSMENT — ACTIVITIES OF DAILY LIVING (ADL)
ADLS_ACUITY_SCORE: 35

## 2023-05-31 NOTE — PROGRESS NOTES
Care Suites Admission Nursing Note    Patient Information  Name: Jerald Lawrence  Age: 62 year old  Reason for admission: Coronary angiogram  Care Suites arrival time: 0715    Visitor Information  Name: Whitley-wife     Patient Admission/Assessment   Pre-procedure assessment complete: Yes  If abnormal assessment/labs, provider notified: N/A  NPO: Yes  Medications held per instructions/orders: Yes  Consent: obtained  Patient oriented to room: Yes  Education/questions answered: Yes  Plan/other: To cath lab as planned    Discharge Planning  Discharge name/phone number: Whitley [phone]  Overnight post sedation caregiver: same  Discharge location: home    Evi Moore RN

## 2023-05-31 NOTE — PRE-PROCEDURE
GENERAL PRE-PROCEDURE:   Procedure:  Coronary angiogram, possible intervention, LVG  Date/Time:  5/31/2023 9:52 AM    Verbal consent obtained?: Yes    Risks and benefits: Risks, benefits and alternatives were discussed    Consent given by:  Patient  Patient states understanding of procedure being performed: Yes    Patient's understanding of procedure matches consent: Yes    Procedure consent matches procedure scheduled: Yes    Expected level of sedation:  Moderate  Appropriately NPO:  Yes  ASA Class:  1  Mallampati  :  Grade 1- soft palate, uvula, tonsillar pillars, and posterior pharyngeal wall visible  Lungs:  Lungs clear with good breath sounds bilaterally  Heart:  Normal heart sounds and rate  History & Physical reviewed:  History and physical reviewed and no updates needed  Statement of review:  I have reviewed the lab findings, diagnostic data, medications, and the plan for sedation

## 2023-05-31 NOTE — DISCHARGE INSTRUCTIONS
Cardiac Angiogram Discharge Instructions - Femoral    After you go home:    Have an adult stay with you until tomorrow.  Drink extra fluids for 2 days.  You may resume your normal diet.  No smoking       For 24 hours - due to the sedation you received:  Relax and take it easy.  Do NOT make any important or legal decisions.  Do NOT drive or operate machines at home or at work.  Do NOT drink alcohol.    Care of Groin Puncture Site:    For the first 24 hrs - check the puncture site every 1-2 hours while awake.  For 2 days, when you cough, sneeze, laugh or move your bowels, hold your hand over the puncture site and press firmly.  Remove the bandaid after 24 hours. If there is minor oozing, apply another bandaid and remove it after 12 hours.  It is normal to have a small bruise or pea size lump at the site.  You may shower tomorrow. Do NOT take a bath, or use a hot tub or pool for at least 3 days. Do NOT scrub the site. Do not use lotion or powder near the puncture site.    Activity:            For 2 days:  No stooping or squatting  Do NOT do any heavy activity such as exercise, lifting, or straining.   No housework, yard work or any activity that make you sweat  Do NOT lift more than 10 pounds    Bleeding:    If you start bleeding from the site in your groin, lie down flat and press firmly on/above the site for 10 minutes.   Once bleeding stops, lay flat for 2 hours.   Call [facility] as soon as you can.       Call 911 right away if you have heavy bleeding or bleeding that does not stop.      Medicines:    If you are taking an antiplatelet medication such as Plavix, Brilinta or Effient, do not stop taking it until you talk to your cardiologist.      Take your medications, including blood thinners, unless your provider tells you not to.      If you have stopped any medicines, check with your provider about when to restart them.    Follow Up Appointments:      Follow up with [facility] Nurse Practitioner at [facility]  Clinic of patient preference in 7-10 days.  Follow up with Dr. Ferrer at [facility] at 1500 in [place] on June 1, 2023.    Call the clinic if:    You have increased pain or a large or growing hard lump around the site.  The site is red, swollen, hot or tender.  Blood or fluid is draining from the site.  You have chills or a fever greater than 101 F (38 C).  Your leg feels numb, cool or changes color.  You have hives, a rash or unusual itching.  New pain in the back or belly that you cannot control with Tylenol.  Any questions or concerns.          [facility] at [place]:    [phone] [facility] (7 days a week)

## 2023-05-31 NOTE — PROGRESS NOTES
Care Suites Discharge Nursing Note    Patient Information  Name: Jerald Lawrence  Age: 62 year old    Discharge Education:  Discharge instructions reviewed: Yes  Additional education/resources provided: all questions answered  Patient/patient representative verbalizes understanding: Yes  Patient discharging on new medications: No  Medication education completed: N/A    Discharge Plans:   Discharge location: home  Discharge ride contacted: Yes  Approximate discharge time: 1430    Discharge Criteria:  Discharge criteria met and vital signs stable: Yes    Patient Belongs:  Patient belongings returned to patient: Yes    Evi Moore RN

## 2023-05-31 NOTE — PROGRESS NOTES
Care Suites Post Procedure Note    Patient Information  Name: Jerald Lawrence  Age: 62 year old    Post Procedure  Time patient returned to Care Suites: 1115  Concerns/abnormal assessment: No  If abnormal assessment, provider notified: N/A  Plan/Other: recover from sedation, tolerate food and fluids, complete tests as ordered by cardiac surgery team.    Evi Moore RN

## 2023-06-01 ENCOUNTER — OFFICE VISIT (OUTPATIENT)
Dept: CARDIOLOGY | Facility: CLINIC | Age: 63
End: 2023-06-01
Payer: COMMERCIAL

## 2023-06-01 ENCOUNTER — DOCUMENTATION ONLY (OUTPATIENT)
Dept: OTHER | Facility: CLINIC | Age: 63
End: 2023-06-01

## 2023-06-01 VITALS
SYSTOLIC BLOOD PRESSURE: 114 MMHG | BODY MASS INDEX: 27.94 KG/M2 | HEIGHT: 71 IN | OXYGEN SATURATION: 96 % | HEART RATE: 50 BPM | WEIGHT: 199.6 LBS | DIASTOLIC BLOOD PRESSURE: 62 MMHG

## 2023-06-01 DIAGNOSIS — I25.118 CORONARY ARTERY DISEASE OF NATIVE HEART WITH STABLE ANGINA PECTORIS, UNSPECIFIED VESSEL OR LESION TYPE (H): Primary | ICD-10-CM

## 2023-06-01 PROCEDURE — 99204 OFFICE O/P NEW MOD 45 MIN: CPT | Performed by: SURGERY

## 2023-06-01 NOTE — PROGRESS NOTES
I met with patient and his wife in consultation  with Dr Ferrer. Pre op instructions reviewed. Pre op testing done and reviewed. Literature and contract information provided. All questions addressed. Plan CABG in near future.

## 2023-06-02 LAB
ATRIAL RATE - MUSE: 53 BPM
DIASTOLIC BLOOD PRESSURE - MUSE: NORMAL MMHG
INTERPRETATION ECG - MUSE: NORMAL
P AXIS - MUSE: 47 DEGREES
PR INTERVAL - MUSE: 146 MS
QRS DURATION - MUSE: 84 MS
QT - MUSE: 404 MS
QTC - MUSE: 379 MS
R AXIS - MUSE: 33 DEGREES
SYSTOLIC BLOOD PRESSURE - MUSE: NORMAL MMHG
T AXIS - MUSE: 88 DEGREES
VENTRICULAR RATE- MUSE: 53 BPM

## 2023-06-02 NOTE — PROGRESS NOTES
Service Date: 06/01/2023    REASON FOR VISIT:  I was requested to see Mr. Lawrence for evaluation of options for coronary artery bypass grafting    HISTORY OF PRESENT ILLNESS:  The patient is a pleasant 62-year-old gentleman who underwent a coronary angiogram recently that showed significant 2-vessel disease.  He has had some intermittent chest pain with exertion over the last several months.  He complains of chest pain that is increasing in intensity and frequency that could be either chest discomfort or heaviness while walking, and he denies any symptoms at rest.  Denies any fever, chills, syncope, palpitations.    MEDICATIONS:  Aspirin, metoprolol, multivitamin, nitroglycerin, Crestor.    SOCIAL HISTORY:  Denies current alcohol, drug or tobacco abuse.  Has a 25-pack-year smoking history.    FAMILY HISTORY:  Heart disease.    PAST MEDICAL HISTORY:  Coronary artery disease.    REVIEW OF SYSTEMS:  A 10-point review of systems was normal other than mentioned in history and physical.    ALLERGIES:  None.    PHYSICAL EXAMINATION:    VITALS:  Afebrile, blood pressure 130/74, heart rate 60.   GENERAL:  He is awake, alert, oriented x3, appears comfortable at rest.  CARDIOVASCULAR:  S1, S2 normal.  No S3, no murmurs.  RESPIRATORY:  Bibasilar rhonchi, crepitations.  ABDOMEN:  Bowel sounds normal, nondistended, nontender.  LOWER EXTREMITIES:  Warm and well perfused.  No pedal edema.  NEUROLOGIC:  No focal deficits.  EYES:  Pupils equal, reactive.  DERM: normal limits.  ENT within normal limits.    LABORATORY:  Electrolytes within normal limits.  White cell count within normal limits.     I reviewed his echocardiogram, which shows ejection fraction 43%.  Left ventricle is normal in size.  There is normal left ventricular wall thickness.  No valvular abnormalities.  Mild to moderate anterior wall hypokinesis, mild to moderate lateral wall hypokinesis.  I reviewed his coronary angiogram that shows completely occluded coronary  artery disease and fills with collaterals, 70% posterior descending artery, 99% first diagonal artery.    ASSESSMENT AND PLAN:  A 62-year-old gentleman with severe coronary artery disease.  I agree with the recommendation to proceed with coronary bypass grafting.  I discussed risks, benefits of surgery with the patient and family, including risk of death, stroke, bleeding, wound failure, renal failure, arrhythmias.  He consented to proceed with surgery.  We will plan to schedule surgery in the next several weeks.  If you have any questions regarding his care, please contact me.    Sushant Ferrer MD        D: 2023   T: 2023   MT: Maddy    Name:     LATONYA MAYERSNitza  MRN:      [id number]        Account:      [id number]   :      1960           Service Date: 2023       Document: [id number]

## 2023-06-05 ENCOUNTER — PREP FOR PROCEDURE (OUTPATIENT)
Dept: CARDIOLOGY | Facility: CLINIC | Age: 63
End: 2023-06-05
Payer: COMMERCIAL

## 2023-06-05 ENCOUNTER — TELEPHONE (OUTPATIENT)
Dept: CARDIOLOGY | Facility: CLINIC | Age: 63
End: 2023-06-05
Payer: COMMERCIAL

## 2023-06-05 DIAGNOSIS — I25.10 CAD (CORONARY ARTERY DISEASE): Primary | ICD-10-CM

## 2023-06-05 NOTE — TELEPHONE ENCOUNTER
Per task, pt needs to schedule surgery with Dr. Ferrer at [facility]. First opening at [facility] isnt until July. The pt and spouse and they would like the first available at [facility]. Scheduled them for 6/20 at [facility] and will call them if something opens sooner at [facility]

## 2023-06-07 NOTE — TELEPHONE ENCOUNTER
Changed the location of surgery from [facility] to [facility]. Pt aware. Will call if anything else changes

## 2023-06-08 ENCOUNTER — TELEPHONE (OUTPATIENT)
Dept: OTHER | Facility: CLINIC | Age: 63
End: 2023-06-08
Payer: COMMERCIAL

## 2023-06-08 RX ORDER — CHLORHEXIDINE GLUCONATE ORAL RINSE 1.2 MG/ML
10 SOLUTION DENTAL ONCE
Status: CANCELLED | OUTPATIENT
Start: 2023-06-08 | End: 2023-06-08

## 2023-06-08 RX ORDER — ASPIRIN 81 MG/1
162 TABLET, CHEWABLE ORAL
Status: CANCELLED | OUTPATIENT
Start: 2023-06-08

## 2023-06-08 RX ORDER — CEFAZOLIN SODIUM 2 G/100ML
2 INJECTION, SOLUTION INTRAVENOUS SEE ADMIN INSTRUCTIONS
Status: CANCELLED | OUTPATIENT
Start: 2023-06-08

## 2023-06-08 RX ORDER — ASPIRIN 81 MG/1
81 TABLET, CHEWABLE ORAL
Status: CANCELLED | OUTPATIENT
Start: 2023-06-08

## 2023-06-08 RX ORDER — CEFAZOLIN SODIUM 2 G/100ML
2 INJECTION, SOLUTION INTRAVENOUS
Status: CANCELLED | OUTPATIENT
Start: 2023-06-08

## 2023-06-08 RX ORDER — FAMOTIDINE 20 MG/1
20 TABLET, FILM COATED ORAL
Status: CANCELLED | OUTPATIENT
Start: 2023-06-08

## 2023-06-08 RX ORDER — LIDOCAINE 40 MG/G
CREAM TOPICAL
Status: CANCELLED | OUTPATIENT
Start: 2023-06-08

## 2023-06-08 NOTE — TELEPHONE ENCOUNTER
Message left for patient informing him I sent him his pre op education letter via my chart. Requested verification of receiving it, contact information left for questions.

## 2023-06-19 RX ORDER — CEFAZOLIN SODIUM 2 G/100ML
2 INJECTION, SOLUTION INTRAVENOUS SEE ADMIN INSTRUCTIONS
Status: CANCELLED | OUTPATIENT
Start: 2023-06-19

## 2023-06-19 RX ORDER — GABAPENTIN 300 MG/1
300 CAPSULE ORAL
Status: CANCELLED | OUTPATIENT
Start: 2023-06-19

## 2023-06-19 RX ORDER — ACETAMINOPHEN 325 MG/1
975 TABLET ORAL ONCE
Status: CANCELLED | OUTPATIENT
Start: 2023-06-19 | End: 2023-06-19

## 2023-06-19 RX ORDER — FAMOTIDINE 20 MG/1
20 TABLET, FILM COATED ORAL
Status: CANCELLED | OUTPATIENT
Start: 2023-06-19

## 2023-06-19 RX ORDER — PHENYLEPHRINE HCL IN 0.9% NACL 50MG/250ML
.1-6 PLASTIC BAG, INJECTION (ML) INTRAVENOUS CONTINUOUS
Status: CANCELLED | OUTPATIENT
Start: 2023-06-30

## 2023-06-19 RX ORDER — NOREPINEPHRINE BITARTRATE 0.06 MG/ML
.01-.1 INJECTION, SOLUTION INTRAVENOUS CONTINUOUS
Status: CANCELLED | OUTPATIENT
Start: 2023-06-30

## 2023-06-19 RX ORDER — DEXMEDETOMIDINE HYDROCHLORIDE 4 UG/ML
.1-1.2 INJECTION, SOLUTION INTRAVENOUS CONTINUOUS
Status: CANCELLED | OUTPATIENT
Start: 2023-06-30

## 2023-06-19 RX ORDER — CEFAZOLIN SODIUM 2 G/100ML
2 INJECTION, SOLUTION INTRAVENOUS
Status: CANCELLED | OUTPATIENT
Start: 2023-06-19

## 2023-06-19 NOTE — TELEPHONE ENCOUNTER
Due to change in the providers schedule, pt needs to r/s their 6/20 surgery. Talked with pt and explained the change. Offered him a new surgery date for 6/30 at [facility]. Pt ok with that. Will call if anything changes

## 2023-06-19 NOTE — PROGRESS NOTES
PTA medications updated by Medication Scribe prior to surgery via phone call with patient (last doses completed by Nurse)     Medication history sources: Patient, Surescripts and H&P  In the past week, patient estimated taking medication this percent of the time: Greater than 90%      Significant changes made to the medication list:  None      Additional medication history information:   None    Medication reconciliation completed by provider prior to medication history? No    Time spent in this activity: 30 MINUTES    The information provided in this note is only as accurate as the sources available at the time of update(s)      Prior to Admission medications    Medication Sig Last Dose Taking? Auth Provider Long Term End Date   aspirin 81 MG EC tablet Take 81 mg by mouth daily  at AM Yes Reported, Patient     metoprolol succinate ER (TOPROL XL) 25 MG 24 hr tablet Take 0.5 tablets (12.5 mg) by mouth daily  at AM Yes Flavia Ibarra MD Yes    Multiple Vitamin (MULTI-VITAMIN DAILY) TABS Take 1 tablet by mouth daily  at AM Yes Reported, Patient     rosuvastatin (CRESTOR) 20 MG tablet Take 1 tablet (20 mg) by mouth daily  at AM Yes Flavia Ibarra MD Yes    nitroGLYcerin (NITROSTAT) 0.4 MG sublingual tablet For chest pain place 1 tablet under the tongue every 5 minutes for 3 doses. If symptoms persist 5 minutes after 1st dose call 911.   Flavia Ibarra MD Yes

## 2023-06-27 ENCOUNTER — ANESTHESIA EVENT (OUTPATIENT)
Dept: SURGERY | Facility: CLINIC | Age: 63
DRG: 235 | End: 2023-06-27
Payer: COMMERCIAL

## 2023-06-29 ASSESSMENT — LIFESTYLE VARIABLES: TOBACCO_USE: 1

## 2023-06-29 NOTE — ANESTHESIA PREPROCEDURE EVALUATION
Anesthesia Pre-Procedure Evaluation    Patient: Jerald Lawrence   MRN: [id number] : 1960        Procedure : Procedure(s):  Coronary Artery Bypass Grafting  possible left radial artery harvest          Past Medical History:   Diagnosis Date     Unspecified part of closed fracture of clavicle     comminuted displaced fx of left clavicle      Past Surgical History:   Procedure Laterality Date     CV CORONARY ANGIOGRAM N/A 2023    Procedure: Coronary Angiogram;  Surgeon: Alvaro Acosta MD;  Location:  [facility]     CV LEFT HEART CATH N/A 2023    Procedure: Left Heart Catheterization;  Surgeon: Alvaro Acosta MD;  Location:  [facility]     CV LEFT VENTRICULOGRAM N/A 2023    Procedure: Left Ventriculogram;  Surgeon: Alvaro Acosta MD;  Location: [facility] CARDIAC CATH LAB     CV PCI N/A 2023    Procedure: Percutaneous Coronary Intervention;  Surgeon: Alvaro Acosta MD;  Location: [facility] CARDIAC CATH LAB      No Known Allergies   Social History     Tobacco Use     Smoking status: Former     Packs/day: 0.75     Years: 35.00     Pack years: 26.25     Types: Cigarettes     Start date:      Quit date: 2023     Years since quittin.4     Passive exposure: Never     Smokeless tobacco: Former     Types: Chew     Quit date:    Substance Use Topics     Alcohol use: Yes     Comment: rare      Wt Readings from Last 1 Encounters:   23 90.5 kg (199 lb 9.6 oz)        Anesthesia Evaluation            ROS/MED HX  ENT/Pulmonary:     (+) tobacco use, Past use,     Neurologic:       Cardiovascular:     (+) Dyslipidemia hypertension--CAD angina-with excertion. --CHF etiology: HFrEF Last EF: 35% EID. Previous cardiac testing   Echo: Date: 23 Results:  Reduced LV ejection fraction of 43% (biplane) due to mild to moderate  hypokinesis of the anterior, anterolateral and apical walls. Inferior and  septal wall contract the best.  The  left ventricle is normal in size.  There is normal left ventricular wall thickness.  Grade I or early diastolic dysfunction.  The right ventricle is normal in structure, function and size.  No significant valve issues.  The study was technically difficult. Contrast was used without apparent  complications. There is no comparison study available.  Stress Test:  Date: 5/10/23 Results:     The nuclear stress test is abnormal.     There is a small area of moderate ischemia in the entire anterior segment(s) of the left ventricle. This appears to be in the left anterior descending distribution.     There is a small area of a mild degree of nontransmural infarction in the apical segment(s) of the left ventricle. This appears to be in the left anterior descending distribution.     Left ventricular function is moderately reduced.     The left ventricular ejection fraction at rest is 35%.  The left ventricular ejection fraction at stress is 35%.     Left ventricular enlargement is noted.     The patient's exercise capacity is average.     There is no prior study for comparison.  ECG Reviewed:  Date: 5/31/23 Results:  SR with LVH  Cath:  Date: 5/31/23 Results:     RPDA lesion is 70% stenosed.     1st Mrg lesion is 20% stenosed.     2nd Mrg lesion is 30% stenosed.     1st Diag lesion is 99% stenosed.     Prox LAD to Mid LAD lesion is 100% stenosed.     The ejection fraction is calculated to be 40%.     Left ventricular filling pressures are mildly elevated.      METS/Exercise Tolerance:     Hematologic:       Musculoskeletal:       GI/Hepatic:       Renal/Genitourinary:       Endo:       Psychiatric/Substance Use:       Infectious Disease:       Malignancy:       Other:            Physical Exam    Airway        Mallampati: II   TM distance: > 3 FB   Neck ROM: full   Mouth opening: > 3 cm    Respiratory Devices and Support         Dental           Cardiovascular          Rhythm and rate: regular     Pulmonary   pulmonary exam  normal                OUTSIDE LABS:  CBC:   Lab Results   Component Value Date    WBC 8.1 05/31/2023    WBC 7.1 05/03/2023    HGB 13.4 05/31/2023    HGB 15.0 05/03/2023    HCT 40.4 05/31/2023    HCT 45.0 05/03/2023     05/31/2023     05/03/2023     BMP:   Lab Results   Component Value Date     05/31/2023     05/03/2023    POTASSIUM 4.1 05/31/2023    POTASSIUM 4.8 05/03/2023    CHLORIDE 108 (H) 05/31/2023    CHLORIDE 105 05/03/2023    CO2 24 05/31/2023    CO2 31 (H) 05/03/2023    BUN 12.0 05/31/2023    BUN 14.1 05/03/2023    CR 0.81 05/31/2023    CR 0.97 05/03/2023    GLC 98 05/31/2023    GLC 88 05/03/2023     COAGS:   Lab Results   Component Value Date    PTT 26 05/31/2023    INR 1.01 05/31/2023     POC: No results found for: BGM, HCG, HCGS  HEPATIC:   Lab Results   Component Value Date    ALBUMIN 3.8 05/31/2023    PROTTOTAL 6.2 (L) 05/31/2023    ALT 20 05/31/2023    AST 26 05/31/2023    ALKPHOS 77 05/31/2023    BILITOTAL 0.3 05/31/2023     OTHER:   Lab Results   Component Value Date    A1C 5.4 05/31/2023    HORACIO 9.1 05/31/2023    TSH 0.10 (L) 04/18/2014    T4 1.29 04/18/2014       Anesthesia Plan    ASA Status:  3   NPO Status:  NPO Appropriate    Anesthesia Type: General.     - Airway: ETT   Induction: Propofol.   Maintenance: Balanced.   Techniques and Equipment:     - Lines/Monitors: Arterial Line, Central Line, PAC, CVP, BIS, NIRS, RANULFO            RANULFO Absolute Contra-indication: NONE            RANULFO Relative Contra-indication: NONE     - Blood: T&C     - Drips/Meds: Vasopressin, Epinephrine, Norepi     Consents    Anesthesia Plan(s) and associated risks, benefits, and realistic alternatives discussed. Questions answered and patient/representative(s) expressed understanding.     - Discussed: Risks, Benefits and Alternatives for BOTH SEDATION and the PROCEDURE were discussed     - Discussed with:  Patient      - Extended Intubation/Ventilatory Support Discussed: Yes.      - Patient is  DNR/DNI Status: No    Use of blood products discussed: Yes.     - Discussed with: Patient.     - Consented: consented to blood products            Reason for refusal: other.     Postoperative Care    Pain management: Oral pain medications, Multi-modal analgesia, IV analgesics.   PONV prophylaxis: Ondansetron (or other 5HT-3), Dexamethasone or Solumedrol     Comments:                Luigi Molina MD

## 2023-06-30 ENCOUNTER — APPOINTMENT (OUTPATIENT)
Dept: GENERAL RADIOLOGY | Facility: CLINIC | Age: 63
DRG: 235 | End: 2023-06-30
Attending: SURGERY
Payer: COMMERCIAL

## 2023-06-30 ENCOUNTER — HOSPITAL ENCOUNTER (INPATIENT)
Facility: CLINIC | Age: 63
LOS: 4 days | Discharge: HOME | DRG: 235 | End: 2023-07-04
Attending: SURGERY | Admitting: SURGERY
Payer: COMMERCIAL

## 2023-06-30 ENCOUNTER — ANESTHESIA (OUTPATIENT)
Dept: SURGERY | Facility: CLINIC | Age: 63
DRG: 235 | End: 2023-06-30
Payer: COMMERCIAL

## 2023-06-30 ENCOUNTER — APPOINTMENT (OUTPATIENT)
Dept: GENERAL RADIOLOGY | Facility: CLINIC | Age: 63
DRG: 235 | End: 2023-06-30
Attending: STUDENT IN AN ORGANIZED HEALTH CARE EDUCATION/TRAINING PROGRAM
Payer: COMMERCIAL

## 2023-06-30 DIAGNOSIS — Z98.890 STATUS POST CORONARY ANGIOGRAM: Primary | ICD-10-CM

## 2023-06-30 DIAGNOSIS — I25.10 CORONARY ARTERY DISEASE INVOLVING NATIVE CORONARY ARTERY OF NATIVE HEART, UNSPECIFIED WHETHER ANGINA PRESENT: ICD-10-CM

## 2023-06-30 LAB
ABO/RH(D): NORMAL
ALBUMIN SERPL BCG-MCNC: 3.5 G/DL (ref 3.5–5.2)
ALLEN'S TEST: ABNORMAL
ALP SERPL-CCNC: 72 U/L (ref 40–129)
ALT SERPL W P-5'-P-CCNC: 27 U/L (ref 0–70)
ANION GAP SERPL CALCULATED.3IONS-SCNC: 11 MMOL/L (ref 7–15)
ANION GAP SERPL CALCULATED.3IONS-SCNC: 11 MMOL/L (ref 7–15)
ANION GAP SERPL CALCULATED.3IONS-SCNC: 17 MMOL/L (ref 7–15)
ANTIBODY SCREEN: NEGATIVE
APTT PPP: 29 SECONDS (ref 22–38)
APTT PPP: 43 SECONDS (ref 22–38)
AST SERPL W P-5'-P-CCNC: 42 U/L (ref 0–45)
BASE EXCESS BLDA CALC-SCNC: -0.6 MMOL/L (ref -9.6–2)
BASE EXCESS BLDA CALC-SCNC: -0.7 MMOL/L (ref -9.6–2)
BASE EXCESS BLDA CALC-SCNC: -1.2 MMOL/L (ref -9.6–2)
BASE EXCESS BLDA CALC-SCNC: -1.7 MMOL/L (ref -9.6–2)
BASE EXCESS BLDA CALC-SCNC: -1.7 MMOL/L (ref -9–1.8)
BASE EXCESS BLDA CALC-SCNC: -2.6 MMOL/L (ref -9.6–2)
BASE EXCESS BLDA CALC-SCNC: -3.6 MMOL/L (ref -9–1.8)
BASE EXCESS BLDA CALC-SCNC: -3.8 MMOL/L (ref -9.6–2)
BASE EXCESS BLDA CALC-SCNC: -4.1 MMOL/L (ref -9–1.8)
BASE EXCESS BLDA CALC-SCNC: -4.5 MMOL/L (ref -9.6–2)
BASE EXCESS BLDA CALC-SCNC: -6.5 MMOL/L (ref -9–1.8)
BASE EXCESS BLDA CALC-SCNC: -8.4 MMOL/L (ref -9–1.8)
BASE EXCESS BLDV CALC-SCNC: -3.3 MMOL/L (ref -7.7–1.9)
BASE EXCESS BLDV CALC-SCNC: -7 MMOL/L (ref -7.7–1.9)
BASE EXCESS BLDV CALC-SCNC: -7.2 MMOL/L (ref -7.7–1.9)
BASE EXCESS BLDV CALC-SCNC: 0 MMOL/L (ref -8.1–1.9)
BASE EXCESS BLDV CALC-SCNC: 1 MMOL/L (ref -7.7–1.9)
BILIRUB SERPL-MCNC: 0.4 MG/DL
BLD PROD TYP BPU: NORMAL
BLD PROD TYP BPU: NORMAL
BLOOD COMPONENT TYPE: NORMAL
BLOOD COMPONENT TYPE: NORMAL
BUN SERPL-MCNC: 14.3 MG/DL (ref 8–23)
BUN SERPL-MCNC: 15.9 MG/DL (ref 8–23)
BUN SERPL-MCNC: 16.3 MG/DL (ref 8–23)
CA-I BLD-MCNC: 4.1 MG/DL (ref 4.4–5.2)
CA-I BLD-MCNC: 4.2 MG/DL (ref 4.4–5.2)
CA-I BLD-MCNC: 4.2 MG/DL (ref 4.4–5.2)
CA-I BLD-MCNC: 4.3 MG/DL (ref 4.4–5.2)
CA-I BLD-MCNC: 4.4 MG/DL (ref 4.4–5.2)
CA-I BLD-MCNC: 4.4 MG/DL (ref 4.4–5.2)
CA-I BLD-MCNC: 4.5 MG/DL (ref 4.4–5.2)
CA-I BLD-MCNC: 5 MG/DL (ref 4.4–5.2)
CA-I BLD-MCNC: 5.2 MG/DL (ref 4.4–5.2)
CALCIUM SERPL-MCNC: 7.9 MG/DL (ref 8.8–10.2)
CALCIUM SERPL-MCNC: 8.3 MG/DL (ref 8.8–10.2)
CALCIUM SERPL-MCNC: 9.6 MG/DL (ref 8.8–10.2)
CHLORIDE SERPL-SCNC: 104 MMOL/L (ref 98–107)
CHLORIDE SERPL-SCNC: 104 MMOL/L (ref 98–107)
CHLORIDE SERPL-SCNC: 109 MMOL/L (ref 98–107)
CLOT INIT KAOL IND TO POST HEP NEUT TRTO: 1 {RATIO}
CLOT INIT KAOL IND TO POST HEP NEUT TRTO: 1.1 {RATIO}
CLOT INIT KAOLIN IND BLD US: 133 SEC (ref 113–166)
CLOT INIT KAOLIN IND BLD US: 143 SEC (ref 113–166)
CLOT INIT KAOLIN IND P HEP NEUT BLD US: 126 SEC (ref 103–153)
CLOT INIT KAOLIN IND P HEP NEUT BLD US: 140 SEC (ref 103–153)
CLOT STIFF PLT CONT BLD CALC: 20.1 HPA (ref 11.9–29.8)
CLOT STIFF PLT CONT BLD CALC: 21.3 HPA (ref 11.9–29.8)
CLOT STIFF TF IND P HEP NEUT BLD US: 22.2 HPA (ref 13–33.2)
CLOT STIFF TF IND P HEP NEUT BLD US: 23.4 HPA (ref 13–33.2)
CLOT STIFF TF IND+IIB-IIIA INH P HEP NEU: 2.1 HPA (ref 1–3.7)
CLOT STIFF TF IND+IIB-IIIA INH P HEP NEU: 2.1 HPA (ref 1–3.7)
CODING SYSTEM: NORMAL
CODING SYSTEM: NORMAL
CREAT SERPL-MCNC: 0.81 MG/DL (ref 0.67–1.17)
CREAT SERPL-MCNC: 0.84 MG/DL (ref 0.67–1.17)
CREAT SERPL-MCNC: 0.87 MG/DL (ref 0.67–1.17)
CROSSMATCH: NORMAL
CROSSMATCH: NORMAL
DEPRECATED HCO3 PLAS-SCNC: 20 MMOL/L (ref 22–29)
DEPRECATED HCO3 PLAS-SCNC: 23 MMOL/L (ref 22–29)
DEPRECATED HCO3 PLAS-SCNC: 25 MMOL/L (ref 22–29)
ERYTHROCYTE [DISTWIDTH] IN BLOOD BY AUTOMATED COUNT: 12 % (ref 10–15)
ERYTHROCYTE [DISTWIDTH] IN BLOOD BY AUTOMATED COUNT: 12 % (ref 10–15)
ERYTHROCYTE [DISTWIDTH] IN BLOOD BY AUTOMATED COUNT: 12.1 % (ref 10–15)
FIBRINOGEN PPP-MCNC: 251 MG/DL (ref 170–490)
GFR SERPL CREATININE-BSD FRML MDRD: >90 ML/MIN/1.73M2
GLUCOSE BLD-MCNC: 118 MG/DL (ref 70–99)
GLUCOSE BLD-MCNC: 134 MG/DL (ref 70–99)
GLUCOSE BLD-MCNC: 137 MG/DL (ref 70–99)
GLUCOSE BLD-MCNC: 139 MG/DL (ref 70–99)
GLUCOSE BLD-MCNC: 155 MG/DL (ref 70–99)
GLUCOSE BLD-MCNC: 169 MG/DL (ref 70–99)
GLUCOSE BLD-MCNC: 189 MG/DL (ref 70–99)
GLUCOSE BLD-MCNC: 98 MG/DL (ref 70–99)
GLUCOSE BLDC GLUCOMTR-MCNC: 139 MG/DL (ref 70–99)
GLUCOSE BLDC GLUCOMTR-MCNC: 152 MG/DL (ref 70–99)
GLUCOSE BLDC GLUCOMTR-MCNC: 154 MG/DL (ref 70–99)
GLUCOSE BLDC GLUCOMTR-MCNC: 156 MG/DL (ref 70–99)
GLUCOSE BLDC GLUCOMTR-MCNC: 162 MG/DL (ref 70–99)
GLUCOSE BLDC GLUCOMTR-MCNC: 172 MG/DL (ref 70–99)
GLUCOSE BLDC GLUCOMTR-MCNC: 172 MG/DL (ref 70–99)
GLUCOSE BLDC GLUCOMTR-MCNC: 207 MG/DL (ref 70–99)
GLUCOSE BLDC GLUCOMTR-MCNC: 82 MG/DL (ref 70–99)
GLUCOSE BLDC GLUCOMTR-MCNC: 95 MG/DL (ref 70–99)
GLUCOSE SERPL-MCNC: 140 MG/DL (ref 70–99)
GLUCOSE SERPL-MCNC: 171 MG/DL (ref 70–99)
GLUCOSE SERPL-MCNC: 88 MG/DL (ref 70–99)
GLUCOSE SERPL-MCNC: 88 MG/DL (ref 70–99)
HCO3 BLD-SCNC: 17 MMOL/L (ref 21–28)
HCO3 BLD-SCNC: 20 MMOL/L (ref 21–28)
HCO3 BLD-SCNC: 22 MMOL/L (ref 21–28)
HCO3 BLD-SCNC: 23 MMOL/L (ref 21–28)
HCO3 BLD-SCNC: 24 MMOL/L (ref 21–28)
HCO3 BLDA-SCNC: 22 MMOL/L (ref 21–28)
HCO3 BLDA-SCNC: 22 MMOL/L (ref 21–28)
HCO3 BLDA-SCNC: 23 MMOL/L (ref 21–28)
HCO3 BLDA-SCNC: 25 MMOL/L (ref 21–28)
HCO3 BLDV-SCNC: 20 MMOL/L (ref 21–28)
HCO3 BLDV-SCNC: 20 MMOL/L (ref 21–28)
HCO3 BLDV-SCNC: 24 MMOL/L (ref 21–28)
HCO3 BLDV-SCNC: 27 MMOL/L (ref 21–28)
HCO3 BLDV-SCNC: 27 MMOL/L (ref 21–28)
HCT VFR BLD AUTO: 35.9 % (ref 40–53)
HCT VFR BLD AUTO: 37.2 % (ref 40–53)
HCT VFR BLD AUTO: 45.7 % (ref 40–53)
HGB BLD-MCNC: 11.3 G/DL (ref 13.3–17.7)
HGB BLD-MCNC: 11.9 G/DL (ref 13.3–17.7)
HGB BLD-MCNC: 12 G/DL (ref 13.3–17.7)
HGB BLD-MCNC: 12.1 G/DL (ref 13.3–17.7)
HGB BLD-MCNC: 12.1 G/DL (ref 13.3–17.7)
HGB BLD-MCNC: 12.2 G/DL (ref 13.3–17.7)
HGB BLD-MCNC: 12.3 G/DL (ref 13.3–17.7)
HGB BLD-MCNC: 12.4 G/DL (ref 13.3–17.7)
HGB BLD-MCNC: 12.6 G/DL (ref 13.3–17.7)
HGB BLD-MCNC: 13.3 G/DL (ref 13.3–17.7)
HGB BLD-MCNC: 14.9 G/DL (ref 13.3–17.7)
INR PPP: 1.05 (ref 0.85–1.15)
INR PPP: 1.31 (ref 0.85–1.15)
INR PPP: 1.39 (ref 0.85–1.15)
LACTATE BLD-SCNC: 0.9 MMOL/L
LACTATE BLD-SCNC: 1.1 MMOL/L
LACTATE BLD-SCNC: 1.8 MMOL/L
LACTATE BLD-SCNC: 1.9 MMOL/L
LACTATE BLD-SCNC: 2 MMOL/L
LACTATE BLD-SCNC: 2 MMOL/L
LACTATE BLD-SCNC: 2.9 MMOL/L
LACTATE BLD-SCNC: 3.1 MMOL/L
LACTATE SERPL-SCNC: 3.7 MMOL/L (ref 0.7–2)
LACTATE SERPL-SCNC: 3.8 MMOL/L (ref 0.7–2)
LACTATE SERPL-SCNC: 4.4 MMOL/L (ref 0.7–2)
LACTATE SERPL-SCNC: 5.7 MMOL/L (ref 0.7–2)
LACTATE SERPL-SCNC: 7.6 MMOL/L (ref 0.7–2)
LACTATE SERPL-SCNC: 8.5 MMOL/L (ref 0.7–2)
LACTATE SERPL-SCNC: 9.3 MMOL/L (ref 0.7–2)
MAGNESIUM SERPL-MCNC: 2.6 MG/DL (ref 1.7–2.3)
MCH RBC QN AUTO: 29.2 PG (ref 26.5–33)
MCH RBC QN AUTO: 29.7 PG (ref 26.5–33)
MCH RBC QN AUTO: 30.1 PG (ref 26.5–33)
MCHC RBC AUTO-ENTMCNC: 32.5 G/DL (ref 31.5–36.5)
MCHC RBC AUTO-ENTMCNC: 32.6 G/DL (ref 31.5–36.5)
MCHC RBC AUTO-ENTMCNC: 33.4 G/DL (ref 31.5–36.5)
MCV RBC AUTO: 90 FL (ref 78–100)
MCV RBC AUTO: 90 FL (ref 78–100)
MCV RBC AUTO: 91 FL (ref 78–100)
MRSA DNA SPEC QL NAA+PROBE: NEGATIVE
O2/TOTAL GAS SETTING VFR VENT: 100 %
O2/TOTAL GAS SETTING VFR VENT: 40 %
O2/TOTAL GAS SETTING VFR VENT: 45 %
O2/TOTAL GAS SETTING VFR VENT: 50 %
O2/TOTAL GAS SETTING VFR VENT: 50 %
O2/TOTAL GAS SETTING VFR VENT: 67 %
O2/TOTAL GAS SETTING VFR VENT: 70 %
O2/TOTAL GAS SETTING VFR VENT: 73 %
O2/TOTAL GAS SETTING VFR VENT: 75 %
O2/TOTAL GAS SETTING VFR VENT: 80 %
O2/TOTAL GAS SETTING VFR VENT: 80 %
OXYHGB MFR BLD: 98 % (ref 92–100)
OXYHGB MFR BLDA: 98 % (ref 92–100)
OXYHGB MFR BLDV: 73 % (ref 70–75)
OXYHGB MFR BLDV: 78 % (ref 70–75)
OXYHGB MFR BLDV: 79 % (ref 70–75)
OXYHGB MFR BLDV: 81 % (ref 70–75)
OXYHGB MFR BLDV: 86 % (ref 92–100)
PCO2 BLD: 35 MM HG (ref 35–45)
PCO2 BLD: 41 MM HG (ref 35–45)
PCO2 BLD: 42 MM HG (ref 35–45)
PCO2 BLD: 44 MM HG (ref 35–45)
PCO2 BLD: 47 MM HG (ref 35–45)
PCO2 BLDA: 40 MM HG (ref 35–45)
PCO2 BLDA: 42 MM HG (ref 35–45)
PCO2 BLDA: 44 MM HG (ref 35–45)
PCO2 BLDA: 45 MM HG (ref 35–45)
PCO2 BLDA: 46 MM HG (ref 35–45)
PCO2 BLDV: 46 MM HG (ref 40–50)
PCO2 BLDV: 48 MM HG (ref 40–50)
PCO2 BLDV: 50 MM HG (ref 40–50)
PCO2 BLDV: 51 MM HG (ref 40–50)
PCO2 BLDV: 53 MM HG (ref 40–50)
PH BLD: 7.29 [PH] (ref 7.35–7.45)
PH BLD: 7.3 [PH] (ref 7.35–7.45)
PH BLD: 7.3 [PH] (ref 7.35–7.45)
PH BLD: 7.31 [PH] (ref 7.35–7.45)
PH BLD: 7.37 [PH] (ref 7.35–7.45)
PH BLDA: 7.29 [PH] (ref 7.35–7.45)
PH BLDA: 7.32 [PH] (ref 7.35–7.45)
PH BLDA: 7.33 [PH] (ref 7.35–7.45)
PH BLDA: 7.35 [PH] (ref 7.35–7.45)
PH BLDA: 7.35 [PH] (ref 7.35–7.45)
PH BLDA: 7.36 [PH] (ref 7.35–7.45)
PH BLDA: 7.38 [PH] (ref 7.35–7.45)
PH BLDV: 7.24 [PH] (ref 7.32–7.43)
PH BLDV: 7.25 [PH] (ref 7.32–7.43)
PH BLDV: 7.27 [PH] (ref 7.32–7.43)
PH BLDV: 7.33 [PH] (ref 7.32–7.43)
PH BLDV: 7.35 [PH] (ref 7.32–7.43)
PHOSPHATE SERPL-MCNC: 3.3 MG/DL (ref 2.5–4.5)
PLATELET # BLD AUTO: 227 10E3/UL (ref 150–450)
PLATELET # BLD AUTO: 228 10E3/UL (ref 150–450)
PLATELET # BLD AUTO: 258 10E3/UL (ref 150–450)
PO2 BLD: 113 MM HG (ref 80–105)
PO2 BLD: 124 MM HG (ref 80–105)
PO2 BLD: 124 MM HG (ref 80–105)
PO2 BLD: 133 MM HG (ref 80–105)
PO2 BLD: 136 MM HG (ref 80–105)
PO2 BLDA: 135 MM HG (ref 80–105)
PO2 BLDA: 151 MM HG (ref 80–105)
PO2 BLDA: 200 MM HG (ref 80–105)
PO2 BLDA: 251 MM HG (ref 80–105)
PO2 BLDA: 275 MM HG (ref 80–105)
PO2 BLDA: 311 MM HG (ref 80–105)
PO2 BLDA: 393 MM HG (ref 80–105)
PO2 BLDV: 40 MM HG (ref 25–47)
PO2 BLDV: 49 MM HG (ref 25–47)
PO2 BLDV: 50 MM HG (ref 25–47)
PO2 BLDV: 51 MM HG (ref 25–47)
PO2 BLDV: 55 MM HG (ref 25–47)
POTASSIUM BLD-SCNC: 3.4 MMOL/L (ref 3.5–5)
POTASSIUM BLD-SCNC: 3.9 MMOL/L (ref 3.5–5)
POTASSIUM BLD-SCNC: 3.9 MMOL/L (ref 3.5–5)
POTASSIUM BLD-SCNC: 4.3 MMOL/L (ref 3.5–5)
POTASSIUM BLD-SCNC: 5.2 MMOL/L (ref 3.5–5)
POTASSIUM BLD-SCNC: 5.3 MMOL/L (ref 3.5–5)
POTASSIUM BLD-SCNC: 5.3 MMOL/L (ref 3.5–5)
POTASSIUM BLD-SCNC: 5.4 MMOL/L (ref 3.5–5)
POTASSIUM SERPL-SCNC: 4 MMOL/L (ref 3.4–5.3)
POTASSIUM SERPL-SCNC: 4.1 MMOL/L (ref 3.4–5.3)
POTASSIUM SERPL-SCNC: 5.2 MMOL/L (ref 3.4–5.3)
PROT SERPL-MCNC: 5.4 G/DL (ref 6.4–8.3)
RADIOLOGIST FLAGS: ABNORMAL
RBC # BLD AUTO: 3.99 10E6/UL (ref 4.4–5.9)
RBC # BLD AUTO: 4.15 10E6/UL (ref 4.4–5.9)
RBC # BLD AUTO: 5.02 10E6/UL (ref 4.4–5.9)
SA TARGET DNA: NEGATIVE
SODIUM BLD-SCNC: 138 MMOL/L (ref 133–144)
SODIUM BLD-SCNC: 139 MMOL/L (ref 133–144)
SODIUM BLD-SCNC: 139 MMOL/L (ref 133–144)
SODIUM BLD-SCNC: 140 MMOL/L (ref 133–144)
SODIUM BLD-SCNC: 141 MMOL/L (ref 133–144)
SODIUM BLD-SCNC: 141 MMOL/L (ref 133–144)
SODIUM SERPL-SCNC: 140 MMOL/L (ref 136–145)
SODIUM SERPL-SCNC: 141 MMOL/L (ref 136–145)
SODIUM SERPL-SCNC: 143 MMOL/L (ref 136–145)
SPECIMEN EXPIRATION DATE: NORMAL
UNIT ABO/RH: NORMAL
UNIT ABO/RH: NORMAL
UNIT NUMBER: NORMAL
UNIT NUMBER: NORMAL
UNIT STATUS: NORMAL
UNIT STATUS: NORMAL
UNIT TYPE ISBT: 6200
UNIT TYPE ISBT: 6200
WBC # BLD AUTO: 19.5 10E3/UL (ref 4–11)
WBC # BLD AUTO: 24 10E3/UL (ref 4–11)
WBC # BLD AUTO: 7.6 10E3/UL (ref 4–11)

## 2023-06-30 PROCEDURE — 84100 ASSAY OF PHOSPHORUS: CPT | Performed by: STUDENT IN AN ORGANIZED HEALTH CARE EDUCATION/TRAINING PROGRAM

## 2023-06-30 PROCEDURE — 83605 ASSAY OF LACTIC ACID: CPT | Performed by: STUDENT IN AN ORGANIZED HEALTH CARE EDUCATION/TRAINING PROGRAM

## 2023-06-30 PROCEDURE — 4A133B3 MONITORING OF ARTERIAL PRESSURE, PULMONARY, PERCUTANEOUS APPROACH: ICD-10-PCS | Performed by: SURGERY

## 2023-06-30 PROCEDURE — 250N000024 HC ISOFLURANE, PER MIN: Performed by: SURGERY

## 2023-06-30 PROCEDURE — 250N000011 HC RX IP 250 OP 636: Mod: JZ | Performed by: STUDENT IN AN ORGANIZED HEALTH CARE EDUCATION/TRAINING PROGRAM

## 2023-06-30 PROCEDURE — 250N000009 HC RX 250: Performed by: SURGERY

## 2023-06-30 PROCEDURE — 250N000011 HC RX IP 250 OP 636: Performed by: STUDENT IN AN ORGANIZED HEALTH CARE EDUCATION/TRAINING PROGRAM

## 2023-06-30 PROCEDURE — 86923 COMPATIBILITY TEST ELECTRIC: CPT | Performed by: STUDENT IN AN ORGANIZED HEALTH CARE EDUCATION/TRAINING PROGRAM

## 2023-06-30 PROCEDURE — 250N000009 HC RX 250: Performed by: STUDENT IN AN ORGANIZED HEALTH CARE EDUCATION/TRAINING PROGRAM

## 2023-06-30 PROCEDURE — 82330 ASSAY OF CALCIUM: CPT

## 2023-06-30 PROCEDURE — 250N000013 HC RX MED GY IP 250 OP 250 PS 637: Performed by: STUDENT IN AN ORGANIZED HEALTH CARE EDUCATION/TRAINING PROGRAM

## 2023-06-30 PROCEDURE — 85018 HEMOGLOBIN: CPT | Performed by: SURGERY

## 2023-06-30 PROCEDURE — 85027 COMPLETE CBC AUTOMATED: CPT

## 2023-06-30 PROCEDURE — 71045 X-RAY EXAM CHEST 1 VIEW: CPT | Mod: 26 | Performed by: RADIOLOGY

## 2023-06-30 PROCEDURE — 5A1223Z PERFORMANCE OF CARDIAC PACING, CONTINUOUS: ICD-10-PCS | Performed by: SURGERY

## 2023-06-30 PROCEDURE — 250N000012 HC RX MED GY IP 250 OP 636 PS 637: Performed by: SURGERY

## 2023-06-30 PROCEDURE — 83735 ASSAY OF MAGNESIUM: CPT | Performed by: STUDENT IN AN ORGANIZED HEALTH CARE EDUCATION/TRAINING PROGRAM

## 2023-06-30 PROCEDURE — 85610 PROTHROMBIN TIME: CPT | Performed by: SURGERY

## 2023-06-30 PROCEDURE — 999N000065 XR ABDOMEN PORT 1 VIEW

## 2023-06-30 PROCEDURE — 85384 FIBRINOGEN ACTIVITY: CPT | Performed by: SURGERY

## 2023-06-30 PROCEDURE — 82803 BLOOD GASES ANY COMBINATION: CPT

## 2023-06-30 PROCEDURE — 82810 BLOOD GASES O2 SAT ONLY: CPT | Performed by: STUDENT IN AN ORGANIZED HEALTH CARE EDUCATION/TRAINING PROGRAM

## 2023-06-30 PROCEDURE — 258N000003 HC RX IP 258 OP 636

## 2023-06-30 PROCEDURE — 82805 BLOOD GASES W/O2 SATURATION: CPT | Performed by: STUDENT IN AN ORGANIZED HEALTH CARE EDUCATION/TRAINING PROGRAM

## 2023-06-30 PROCEDURE — 83735 ASSAY OF MAGNESIUM: CPT | Performed by: SURGERY

## 2023-06-30 PROCEDURE — 272N000085 HC PACK CELL SAVER CSP: Performed by: SURGERY

## 2023-06-30 PROCEDURE — 94002 VENT MGMT INPAT INIT DAY: CPT

## 2023-06-30 PROCEDURE — 82947 ASSAY GLUCOSE BLOOD QUANT: CPT | Performed by: SURGERY

## 2023-06-30 PROCEDURE — 85730 THROMBOPLASTIN TIME PARTIAL: CPT | Performed by: SURGERY

## 2023-06-30 PROCEDURE — 84132 ASSAY OF SERUM POTASSIUM: CPT

## 2023-06-30 PROCEDURE — 999N000141 HC STATISTIC PRE-PROCEDURE NURSING ASSESSMENT: Performed by: SURGERY

## 2023-06-30 PROCEDURE — 258N000003 HC RX IP 258 OP 636: Performed by: STUDENT IN AN ORGANIZED HEALTH CARE EDUCATION/TRAINING PROGRAM

## 2023-06-30 PROCEDURE — 82805 BLOOD GASES W/O2 SATURATION: CPT

## 2023-06-30 PROCEDURE — 99291 CRITICAL CARE FIRST HOUR: CPT | Mod: 24 | Performed by: ANESTHESIOLOGY

## 2023-06-30 PROCEDURE — 250N000011 HC RX IP 250 OP 636: Performed by: SURGERY

## 2023-06-30 PROCEDURE — 33508 ENDOSCOPIC VEIN HARVEST: CPT | Mod: XU | Performed by: SURGERY

## 2023-06-30 PROCEDURE — 250N000012 HC RX MED GY IP 250 OP 636 PS 637: Performed by: STUDENT IN AN ORGANIZED HEALTH CARE EDUCATION/TRAINING PROGRAM

## 2023-06-30 PROCEDURE — 87641 MR-STAPH DNA AMP PROBE: CPT | Performed by: SURGERY

## 2023-06-30 PROCEDURE — 250N000009 HC RX 250: Performed by: ANESTHESIOLOGY

## 2023-06-30 PROCEDURE — 410N000003 HC PER-PERFUSION 1ST 30 MIN: Performed by: SURGERY

## 2023-06-30 PROCEDURE — 272N000088 HC PUMP APP ADULT PERFUSION: Performed by: SURGERY

## 2023-06-30 PROCEDURE — 85396 CLOTTING ASSAY WHOLE BLOOD: CPT

## 2023-06-30 PROCEDURE — 272N000001 HC OR GENERAL SUPPLY STERILE: Performed by: SURGERY

## 2023-06-30 PROCEDURE — 999N000065 XR CHEST PORT 1 VIEW

## 2023-06-30 PROCEDURE — 85730 THROMBOPLASTIN TIME PARTIAL: CPT | Performed by: STUDENT IN AN ORGANIZED HEALTH CARE EDUCATION/TRAINING PROGRAM

## 2023-06-30 PROCEDURE — 02100Z9 BYPASS CORONARY ARTERY, ONE ARTERY FROM LEFT INTERNAL MAMMARY, OPEN APPROACH: ICD-10-PCS | Performed by: SURGERY

## 2023-06-30 PROCEDURE — 999N000155 HC STATISTIC RAPCV CVP MONITORING

## 2023-06-30 PROCEDURE — 02HQ32Z INSERTION OF MONITORING DEVICE INTO RIGHT PULMONARY ARTERY, PERCUTANEOUS APPROACH: ICD-10-PCS | Performed by: SURGERY

## 2023-06-30 PROCEDURE — 360N000079 HC SURGERY LEVEL 6, PER MIN: Performed by: SURGERY

## 2023-06-30 PROCEDURE — 82810 BLOOD GASES O2 SAT ONLY: CPT

## 2023-06-30 PROCEDURE — 021109W BYPASS CORONARY ARTERY, TWO ARTERIES FROM AORTA WITH AUTOLOGOUS VENOUS TISSUE, OPEN APPROACH: ICD-10-PCS | Performed by: SURGERY

## 2023-06-30 PROCEDURE — 82947 ASSAY GLUCOSE BLOOD QUANT: CPT | Performed by: ANESTHESIOLOGY

## 2023-06-30 PROCEDURE — 82805 BLOOD GASES W/O2 SATURATION: CPT | Performed by: SURGERY

## 2023-06-30 PROCEDURE — 250N000013 HC RX MED GY IP 250 OP 250 PS 637

## 2023-06-30 PROCEDURE — 80053 COMPREHEN METABOLIC PANEL: CPT | Performed by: ANESTHESIOLOGY

## 2023-06-30 PROCEDURE — 84132 ASSAY OF SERUM POTASSIUM: CPT | Performed by: SURGERY

## 2023-06-30 PROCEDURE — 4A1239Z MONITORING OF CARDIAC OUTPUT, PERCUTANEOUS APPROACH: ICD-10-PCS | Performed by: SURGERY

## 2023-06-30 PROCEDURE — 200N000002 HC R&B ICU UMMC

## 2023-06-30 PROCEDURE — 258N000003 HC RX IP 258 OP 636: Performed by: SURGERY

## 2023-06-30 PROCEDURE — 999N000253 HC STATISTIC WEANING TRIALS

## 2023-06-30 PROCEDURE — 33518 CABG ARTERY-VEIN TWO: CPT | Mod: GC | Performed by: SURGERY

## 2023-06-30 PROCEDURE — 85018 HEMOGLOBIN: CPT | Performed by: STUDENT IN AN ORGANIZED HEALTH CARE EDUCATION/TRAINING PROGRAM

## 2023-06-30 PROCEDURE — 83605 ASSAY OF LACTIC ACID: CPT | Performed by: SURGERY

## 2023-06-30 PROCEDURE — 410N000004: Performed by: SURGERY

## 2023-06-30 PROCEDURE — 84132 ASSAY OF SERUM POTASSIUM: CPT | Performed by: STUDENT IN AN ORGANIZED HEALTH CARE EDUCATION/TRAINING PROGRAM

## 2023-06-30 PROCEDURE — 250N000013 HC RX MED GY IP 250 OP 250 PS 637: Performed by: SURGERY

## 2023-06-30 PROCEDURE — 86901 BLOOD TYPING SEROLOGIC RH(D): CPT | Performed by: ANESTHESIOLOGY

## 2023-06-30 PROCEDURE — 93005 ELECTROCARDIOGRAM TRACING: CPT

## 2023-06-30 PROCEDURE — 258N000003 HC RX IP 258 OP 636: Performed by: PHYSICIAN ASSISTANT

## 2023-06-30 PROCEDURE — 82330 ASSAY OF CALCIUM: CPT | Performed by: STUDENT IN AN ORGANIZED HEALTH CARE EDUCATION/TRAINING PROGRAM

## 2023-06-30 PROCEDURE — 36415 COLL VENOUS BLD VENIPUNCTURE: CPT | Performed by: SURGERY

## 2023-06-30 PROCEDURE — 06BQ4ZZ EXCISION OF LEFT SAPHENOUS VEIN, PERCUTANEOUS ENDOSCOPIC APPROACH: ICD-10-PCS | Performed by: SURGERY

## 2023-06-30 PROCEDURE — 84295 ASSAY OF SERUM SODIUM: CPT

## 2023-06-30 PROCEDURE — 86850 RBC ANTIBODY SCREEN: CPT | Performed by: ANESTHESIOLOGY

## 2023-06-30 PROCEDURE — 5A1221Z PERFORMANCE OF CARDIAC OUTPUT, CONTINUOUS: ICD-10-PCS | Performed by: SURGERY

## 2023-06-30 PROCEDURE — 250N000011 HC RX IP 250 OP 636

## 2023-06-30 PROCEDURE — 83605 ASSAY OF LACTIC ACID: CPT

## 2023-06-30 PROCEDURE — C1898 LEAD, PMKR, OTHER THAN TRANS: HCPCS | Performed by: SURGERY

## 2023-06-30 PROCEDURE — 250N000011 HC RX IP 250 OP 636: Mod: JZ | Performed by: SURGERY

## 2023-06-30 PROCEDURE — 74018 RADEX ABDOMEN 1 VIEW: CPT | Mod: 26 | Performed by: RADIOLOGY

## 2023-06-30 PROCEDURE — 370N000017 HC ANESTHESIA TECHNICAL FEE, PER MIN: Performed by: SURGERY

## 2023-06-30 PROCEDURE — 258N000003 HC RX IP 258 OP 636: Performed by: ANESTHESIOLOGY

## 2023-06-30 PROCEDURE — 80069 RENAL FUNCTION PANEL: CPT | Performed by: SURGERY

## 2023-06-30 PROCEDURE — 33533 CABG ARTERIAL SINGLE: CPT | Mod: GC | Performed by: SURGERY

## 2023-06-30 PROCEDURE — 999N000157 HC STATISTIC RCP TIME EA 10 MIN

## 2023-06-30 PROCEDURE — 85610 PROTHROMBIN TIME: CPT | Performed by: STUDENT IN AN ORGANIZED HEALTH CARE EDUCATION/TRAINING PROGRAM

## 2023-06-30 RX ORDER — PHENYLEPHRINE HCL IN 0.9% NACL 50MG/250ML
.1-6 PLASTIC BAG, INJECTION (ML) INTRAVENOUS CONTINUOUS
Status: DISCONTINUED | OUTPATIENT
Start: 2023-06-30 | End: 2023-06-30 | Stop reason: HOSPADM

## 2023-06-30 RX ORDER — HYDRALAZINE HYDROCHLORIDE 20 MG/ML
10 INJECTION INTRAMUSCULAR; INTRAVENOUS EVERY 30 MIN PRN
Status: DISCONTINUED | OUTPATIENT
Start: 2023-06-30 | End: 2023-07-04 | Stop reason: HOSPADM

## 2023-06-30 RX ORDER — DEXMEDETOMIDINE HYDROCHLORIDE 4 UG/ML
.2-.7 INJECTION, SOLUTION INTRAVENOUS CONTINUOUS
Status: DISCONTINUED | OUTPATIENT
Start: 2023-06-30 | End: 2023-07-01

## 2023-06-30 RX ORDER — ONDANSETRON 4 MG/1
4 TABLET, ORALLY DISINTEGRATING ORAL EVERY 6 HOURS PRN
Status: DISCONTINUED | OUTPATIENT
Start: 2023-06-30 | End: 2023-07-04 | Stop reason: HOSPADM

## 2023-06-30 RX ORDER — VASOPRESSIN IN 0.9 % NACL 2 UNIT/2ML
SYRINGE (ML) INTRAVENOUS PRN
Status: DISCONTINUED | OUTPATIENT
Start: 2023-06-30 | End: 2023-06-30

## 2023-06-30 RX ORDER — NALOXONE HYDROCHLORIDE 0.4 MG/ML
0.2 INJECTION, SOLUTION INTRAMUSCULAR; INTRAVENOUS; SUBCUTANEOUS
Status: DISCONTINUED | OUTPATIENT
Start: 2023-06-30 | End: 2023-07-04 | Stop reason: HOSPADM

## 2023-06-30 RX ORDER — ONDANSETRON 2 MG/ML
4 INJECTION INTRAMUSCULAR; INTRAVENOUS EVERY 6 HOURS PRN
Status: DISCONTINUED | OUTPATIENT
Start: 2023-06-30 | End: 2023-07-04 | Stop reason: HOSPADM

## 2023-06-30 RX ORDER — PROPOFOL 10 MG/ML
INJECTION, EMULSION INTRAVENOUS CONTINUOUS PRN
Status: DISCONTINUED | OUTPATIENT
Start: 2023-06-30 | End: 2023-06-30

## 2023-06-30 RX ORDER — CHLORHEXIDINE GLUCONATE ORAL RINSE 1.2 MG/ML
15 SOLUTION DENTAL EVERY 12 HOURS
Status: DISCONTINUED | OUTPATIENT
Start: 2023-06-30 | End: 2023-07-01

## 2023-06-30 RX ORDER — LIDOCAINE 4 G/G
1 PATCH TOPICAL EVERY 24 HOURS
Status: DISCONTINUED | OUTPATIENT
Start: 2023-06-30 | End: 2023-07-04 | Stop reason: HOSPADM

## 2023-06-30 RX ORDER — AMOXICILLIN 250 MG
1 CAPSULE ORAL 2 TIMES DAILY
Status: DISCONTINUED | OUTPATIENT
Start: 2023-06-30 | End: 2023-07-04 | Stop reason: HOSPADM

## 2023-06-30 RX ORDER — CALCIUM GLUCONATE 20 MG/ML
2 INJECTION, SOLUTION INTRAVENOUS
Status: DISCONTINUED | OUTPATIENT
Start: 2023-06-30 | End: 2023-07-04

## 2023-06-30 RX ORDER — PROPOFOL 10 MG/ML
5-75 INJECTION, EMULSION INTRAVENOUS CONTINUOUS
Status: CANCELLED | OUTPATIENT
Start: 2023-06-30

## 2023-06-30 RX ORDER — CEFAZOLIN SODIUM/WATER 2 G/20 ML
2 SYRINGE (ML) INTRAVENOUS SEE ADMIN INSTRUCTIONS
Status: DISCONTINUED | OUTPATIENT
Start: 2023-06-30 | End: 2023-06-30 | Stop reason: HOSPADM

## 2023-06-30 RX ORDER — NICOTINE POLACRILEX 4 MG
15-30 LOZENGE BUCCAL
Status: DISCONTINUED | OUTPATIENT
Start: 2023-06-30 | End: 2023-07-01

## 2023-06-30 RX ORDER — OXYCODONE HYDROCHLORIDE 10 MG/1
10 TABLET ORAL EVERY 4 HOURS PRN
Status: DISCONTINUED | OUTPATIENT
Start: 2023-06-30 | End: 2023-07-04 | Stop reason: HOSPADM

## 2023-06-30 RX ORDER — LIDOCAINE 40 MG/G
CREAM TOPICAL
Status: DISCONTINUED | OUTPATIENT
Start: 2023-06-30 | End: 2023-07-04 | Stop reason: HOSPADM

## 2023-06-30 RX ORDER — BISACODYL 10 MG
10 SUPPOSITORY, RECTAL RECTAL DAILY PRN
Status: DISCONTINUED | OUTPATIENT
Start: 2023-06-30 | End: 2023-07-04 | Stop reason: HOSPADM

## 2023-06-30 RX ORDER — NICOTINE POLACRILEX 4 MG
15-30 LOZENGE BUCCAL
Status: DISCONTINUED | OUTPATIENT
Start: 2023-06-30 | End: 2023-06-30

## 2023-06-30 RX ORDER — DEXTROSE MONOHYDRATE 25 G/50ML
25-50 INJECTION, SOLUTION INTRAVENOUS
Status: DISCONTINUED | OUTPATIENT
Start: 2023-06-30 | End: 2023-07-01

## 2023-06-30 RX ORDER — OXYCODONE HYDROCHLORIDE 5 MG/1
5 TABLET ORAL EVERY 4 HOURS PRN
Status: DISCONTINUED | OUTPATIENT
Start: 2023-06-30 | End: 2023-07-04 | Stop reason: HOSPADM

## 2023-06-30 RX ORDER — GABAPENTIN 100 MG/1
100 CAPSULE ORAL 3 TIMES DAILY
Status: DISCONTINUED | OUTPATIENT
Start: 2023-06-30 | End: 2023-07-02

## 2023-06-30 RX ORDER — HEPARIN SODIUM 1000 [USP'U]/ML
INJECTION, SOLUTION INTRAVENOUS; SUBCUTANEOUS PRN
Status: DISCONTINUED | OUTPATIENT
Start: 2023-06-30 | End: 2023-06-30

## 2023-06-30 RX ORDER — CEFAZOLIN SODIUM 2 G/100ML
2 INJECTION, SOLUTION INTRAVENOUS EVERY 8 HOURS
Status: COMPLETED | OUTPATIENT
Start: 2023-06-30 | End: 2023-07-01

## 2023-06-30 RX ORDER — PROCHLORPERAZINE MALEATE 5 MG
10 TABLET ORAL EVERY 6 HOURS PRN
Status: DISCONTINUED | OUTPATIENT
Start: 2023-06-30 | End: 2023-07-04

## 2023-06-30 RX ORDER — GABAPENTIN 300 MG/1
300 CAPSULE ORAL
Status: COMPLETED | OUTPATIENT
Start: 2023-06-30 | End: 2023-06-30

## 2023-06-30 RX ORDER — NOREPINEPHRINE BITARTRATE 0.06 MG/ML
.01-.6 INJECTION, SOLUTION INTRAVENOUS CONTINUOUS
Status: CANCELLED | OUTPATIENT
Start: 2023-06-30

## 2023-06-30 RX ORDER — PANTOPRAZOLE SODIUM 40 MG/1
40 TABLET, DELAYED RELEASE ORAL DAILY
Status: DISCONTINUED | OUTPATIENT
Start: 2023-06-30 | End: 2023-07-01

## 2023-06-30 RX ORDER — NOREPINEPHRINE BITARTRATE 0.06 MG/ML
.01-.6 INJECTION, SOLUTION INTRAVENOUS CONTINUOUS
Status: DISCONTINUED | OUTPATIENT
Start: 2023-06-30 | End: 2023-06-30

## 2023-06-30 RX ORDER — NITROGLYCERIN 10 MG/100ML
INJECTION INTRAVENOUS PRN
Status: DISCONTINUED | OUTPATIENT
Start: 2023-06-30 | End: 2023-06-30

## 2023-06-30 RX ORDER — DEXMEDETOMIDINE HYDROCHLORIDE 4 UG/ML
.1-1.2 INJECTION, SOLUTION INTRAVENOUS CONTINUOUS
Status: CANCELLED | OUTPATIENT
Start: 2023-06-30

## 2023-06-30 RX ORDER — SODIUM CHLORIDE, SODIUM GLUCONATE, SODIUM ACETATE, POTASSIUM CHLORIDE AND MAGNESIUM CHLORIDE 526; 502; 368; 37; 30 MG/100ML; MG/100ML; MG/100ML; MG/100ML; MG/100ML
INJECTION, SOLUTION INTRAVENOUS CONTINUOUS PRN
Status: DISCONTINUED | OUTPATIENT
Start: 2023-06-30 | End: 2023-06-30

## 2023-06-30 RX ORDER — ASPIRIN 81 MG/1
162 TABLET, CHEWABLE ORAL DAILY
Status: DISCONTINUED | OUTPATIENT
Start: 2023-06-30 | End: 2023-07-02

## 2023-06-30 RX ORDER — CEFAZOLIN SODIUM/WATER 2 G/20 ML
2 SYRINGE (ML) INTRAVENOUS
Status: COMPLETED | OUTPATIENT
Start: 2023-06-30 | End: 2023-06-30

## 2023-06-30 RX ORDER — NALOXONE HYDROCHLORIDE 0.4 MG/ML
0.4 INJECTION, SOLUTION INTRAMUSCULAR; INTRAVENOUS; SUBCUTANEOUS
Status: DISCONTINUED | OUTPATIENT
Start: 2023-06-30 | End: 2023-07-04 | Stop reason: HOSPADM

## 2023-06-30 RX ORDER — CHLORHEXIDINE GLUCONATE ORAL RINSE 1.2 MG/ML
15 SOLUTION DENTAL EVERY 12 HOURS
Status: CANCELLED | OUTPATIENT
Start: 2023-06-30

## 2023-06-30 RX ORDER — ASPIRIN 81 MG/1
162 TABLET, CHEWABLE ORAL
Status: COMPLETED | OUTPATIENT
Start: 2023-06-30 | End: 2023-06-30

## 2023-06-30 RX ORDER — FENTANYL CITRATE 50 UG/ML
INJECTION, SOLUTION INTRAMUSCULAR; INTRAVENOUS PRN
Status: DISCONTINUED | OUTPATIENT
Start: 2023-06-30 | End: 2023-06-30

## 2023-06-30 RX ORDER — HYDROMORPHONE HCL IN WATER/PF 6 MG/30 ML
0.2 PATIENT CONTROLLED ANALGESIA SYRINGE INTRAVENOUS
Status: DISCONTINUED | OUTPATIENT
Start: 2023-06-30 | End: 2023-07-03

## 2023-06-30 RX ORDER — SODIUM CHLORIDE, SODIUM LACTATE, POTASSIUM CHLORIDE, CALCIUM CHLORIDE 600; 310; 30; 20 MG/100ML; MG/100ML; MG/100ML; MG/100ML
INJECTION, SOLUTION INTRAVENOUS CONTINUOUS
Status: DISCONTINUED | OUTPATIENT
Start: 2023-06-30 | End: 2023-06-30 | Stop reason: HOSPADM

## 2023-06-30 RX ORDER — PROPOFOL 10 MG/ML
INJECTION, EMULSION INTRAVENOUS PRN
Status: DISCONTINUED | OUTPATIENT
Start: 2023-06-30 | End: 2023-06-30

## 2023-06-30 RX ORDER — CHLORHEXIDINE GLUCONATE ORAL RINSE 1.2 MG/ML
10 SOLUTION DENTAL ONCE
Status: COMPLETED | OUTPATIENT
Start: 2023-06-30 | End: 2023-06-30

## 2023-06-30 RX ORDER — DEXMEDETOMIDINE HYDROCHLORIDE 4 UG/ML
.1-1.2 INJECTION, SOLUTION INTRAVENOUS CONTINUOUS
Status: DISCONTINUED | OUTPATIENT
Start: 2023-06-30 | End: 2023-06-30 | Stop reason: HOSPADM

## 2023-06-30 RX ORDER — HEPARIN SODIUM 5000 [USP'U]/.5ML
5000 INJECTION, SOLUTION INTRAVENOUS; SUBCUTANEOUS EVERY 8 HOURS
Status: DISCONTINUED | OUTPATIENT
Start: 2023-07-01 | End: 2023-07-04 | Stop reason: HOSPADM

## 2023-06-30 RX ORDER — ASPIRIN 81 MG/1
81 TABLET, CHEWABLE ORAL
Status: COMPLETED | OUTPATIENT
Start: 2023-06-30 | End: 2023-06-30

## 2023-06-30 RX ORDER — ACETAMINOPHEN 325 MG/1
650 TABLET ORAL EVERY 4 HOURS PRN
Status: DISCONTINUED | OUTPATIENT
Start: 2023-07-03 | End: 2023-07-04 | Stop reason: HOSPADM

## 2023-06-30 RX ORDER — LIDOCAINE 40 MG/G
CREAM TOPICAL
Status: DISCONTINUED | OUTPATIENT
Start: 2023-06-30 | End: 2023-06-30 | Stop reason: HOSPADM

## 2023-06-30 RX ORDER — ACETAMINOPHEN 325 MG/1
975 TABLET ORAL ONCE
Status: COMPLETED | OUTPATIENT
Start: 2023-06-30 | End: 2023-06-30

## 2023-06-30 RX ORDER — POLYETHYLENE GLYCOL 3350 17 G/17G
17 POWDER, FOR SOLUTION ORAL DAILY
Status: DISCONTINUED | OUTPATIENT
Start: 2023-07-01 | End: 2023-07-04 | Stop reason: HOSPADM

## 2023-06-30 RX ORDER — DEXTROSE MONOHYDRATE 25 G/50ML
25-50 INJECTION, SOLUTION INTRAVENOUS
Status: DISCONTINUED | OUTPATIENT
Start: 2023-06-30 | End: 2023-06-30

## 2023-06-30 RX ORDER — LIDOCAINE HYDROCHLORIDE 10 MG/ML
INJECTION, SOLUTION INFILTRATION; PERINEURAL
Status: COMPLETED | OUTPATIENT
Start: 2023-06-30 | End: 2023-06-30

## 2023-06-30 RX ORDER — HYDROMORPHONE HCL IN WATER/PF 6 MG/30 ML
0.4 PATIENT CONTROLLED ANALGESIA SYRINGE INTRAVENOUS
Status: DISCONTINUED | OUTPATIENT
Start: 2023-06-30 | End: 2023-07-03

## 2023-06-30 RX ORDER — NITROGLYCERIN 20 MG/100ML
10-200 INJECTION INTRAVENOUS CONTINUOUS PRN
Status: DISCONTINUED | OUTPATIENT
Start: 2023-06-30 | End: 2023-07-01

## 2023-06-30 RX ORDER — ACETAMINOPHEN 325 MG/1
975 TABLET ORAL EVERY 8 HOURS
Status: COMPLETED | OUTPATIENT
Start: 2023-06-30 | End: 2023-07-03

## 2023-06-30 RX ORDER — NOREPINEPHRINE BITARTRATE 0.06 MG/ML
.01-.1 INJECTION, SOLUTION INTRAVENOUS CONTINUOUS
Status: DISCONTINUED | OUTPATIENT
Start: 2023-06-30 | End: 2023-06-30 | Stop reason: HOSPADM

## 2023-06-30 RX ORDER — NOREPINEPHRINE BITARTRATE 0.06 MG/ML
.01-.4 INJECTION, SOLUTION INTRAVENOUS CONTINUOUS PRN
Status: DISCONTINUED | OUTPATIENT
Start: 2023-06-30 | End: 2023-07-01

## 2023-06-30 RX ORDER — FAMOTIDINE 20 MG/1
20 TABLET, FILM COATED ORAL
Status: COMPLETED | OUTPATIENT
Start: 2023-06-30 | End: 2023-06-30

## 2023-06-30 RX ORDER — LIDOCAINE HYDROCHLORIDE 20 MG/ML
INJECTION, SOLUTION INFILTRATION; PERINEURAL PRN
Status: DISCONTINUED | OUTPATIENT
Start: 2023-06-30 | End: 2023-06-30

## 2023-06-30 RX ORDER — CALCIUM GLUCONATE 20 MG/ML
1 INJECTION, SOLUTION INTRAVENOUS
Status: DISCONTINUED | OUTPATIENT
Start: 2023-06-30 | End: 2023-07-04

## 2023-06-30 RX ORDER — PROPOFOL 10 MG/ML
5-75 INJECTION, EMULSION INTRAVENOUS CONTINUOUS
Status: DISCONTINUED | OUTPATIENT
Start: 2023-06-30 | End: 2023-07-01

## 2023-06-30 RX ORDER — METHOCARBAMOL 750 MG/1
750 TABLET, FILM COATED ORAL EVERY 6 HOURS PRN
Status: DISCONTINUED | OUTPATIENT
Start: 2023-06-30 | End: 2023-07-04 | Stop reason: HOSPADM

## 2023-06-30 RX ADMIN — ASPIRIN 81 MG CHEWABLE TABLET 162 MG: 81 TABLET CHEWABLE at 05:59

## 2023-06-30 RX ADMIN — NITROGLYCERIN 100 MCG: 10 INJECTION INTRAVENOUS at 12:26

## 2023-06-30 RX ADMIN — FENTANYL CITRATE 100 MCG: 50 INJECTION, SOLUTION INTRAMUSCULAR; INTRAVENOUS at 07:39

## 2023-06-30 RX ADMIN — INSULIN HUMAN 3 UNITS: 100 INJECTION, SOLUTION PARENTERAL at 11:46

## 2023-06-30 RX ADMIN — Medication 2 G: at 08:09

## 2023-06-30 RX ADMIN — Medication 30 MG: at 09:47

## 2023-06-30 RX ADMIN — ACETAMINOPHEN 975 MG: 325 TABLET, FILM COATED ORAL at 22:31

## 2023-06-30 RX ADMIN — NOREPINEPHRINE BITARTRATE 6.4 MCG: 1 INJECTION, SOLUTION, CONCENTRATE INTRAVENOUS at 13:25

## 2023-06-30 RX ADMIN — HYDROMORPHONE HYDROCHLORIDE 0.5 MG: 1 INJECTION, SOLUTION INTRAMUSCULAR; INTRAVENOUS; SUBCUTANEOUS at 13:16

## 2023-06-30 RX ADMIN — NOREPINEPHRINE BITARTRATE 6.4 MCG: 1 INJECTION, SOLUTION, CONCENTRATE INTRAVENOUS at 10:15

## 2023-06-30 RX ADMIN — SUGAMMADEX 200 MG: 100 INJECTION, SOLUTION INTRAVENOUS at 13:57

## 2023-06-30 RX ADMIN — SENNOSIDES AND DOCUSATE SODIUM 1 TABLET: 50; 8.6 TABLET ORAL at 20:00

## 2023-06-30 RX ADMIN — NOREPINEPHRINE BITARTRATE 6.4 MCG: 1 INJECTION, SOLUTION, CONCENTRATE INTRAVENOUS at 12:19

## 2023-06-30 RX ADMIN — NOREPINEPHRINE BITARTRATE 6.4 MCG: 1 INJECTION, SOLUTION, CONCENTRATE INTRAVENOUS at 08:13

## 2023-06-30 RX ADMIN — NOREPINEPHRINE BITARTRATE 6.4 MCG: 1 INJECTION, SOLUTION, CONCENTRATE INTRAVENOUS at 10:29

## 2023-06-30 RX ADMIN — SODIUM CHLORIDE, POTASSIUM CHLORIDE, SODIUM LACTATE AND CALCIUM CHLORIDE 500 ML: 600; 310; 30; 20 INJECTION, SOLUTION INTRAVENOUS at 14:36

## 2023-06-30 RX ADMIN — FENTANYL CITRATE 150 MCG: 50 INJECTION, SOLUTION INTRAMUSCULAR; INTRAVENOUS at 12:26

## 2023-06-30 RX ADMIN — CHLORHEXIDINE GLUCONATE 10 ML: 1.2 SOLUTION ORAL at 06:00

## 2023-06-30 RX ADMIN — NOREPINEPHRINE BITARTRATE 6.4 MCG: 1 INJECTION, SOLUTION, CONCENTRATE INTRAVENOUS at 14:01

## 2023-06-30 RX ADMIN — HEPARIN SODIUM 28000 UNITS: 1000 INJECTION INTRAVENOUS; SUBCUTANEOUS at 10:05

## 2023-06-30 RX ADMIN — Medication 10 MG: at 12:58

## 2023-06-30 RX ADMIN — Medication 0.05 MCG/KG/MIN: at 08:04

## 2023-06-30 RX ADMIN — FENTANYL CITRATE 50 MCG: 50 INJECTION, SOLUTION INTRAMUSCULAR; INTRAVENOUS at 07:41

## 2023-06-30 RX ADMIN — EPINEPHRINE 0.03 MCG/KG/MIN: 1 INJECTION INTRAMUSCULAR; INTRAVENOUS; SUBCUTANEOUS at 08:18

## 2023-06-30 RX ADMIN — GABAPENTIN 100 MG: 100 CAPSULE ORAL at 20:00

## 2023-06-30 RX ADMIN — AMINOCAPROIC ACID 1.25 G/HR: 250 INJECTION, SOLUTION INTRAVENOUS at 09:07

## 2023-06-30 RX ADMIN — PROPOFOL 200 MG: 10 INJECTION, EMULSION INTRAVENOUS at 07:39

## 2023-06-30 RX ADMIN — SODIUM BICARBONATE 50 MEQ: 84 INJECTION, SOLUTION INTRAVENOUS at 22:42

## 2023-06-30 RX ADMIN — PROPOFOL 50 MCG/KG/MIN: 10 INJECTION, EMULSION INTRAVENOUS at 16:15

## 2023-06-30 RX ADMIN — PROPOFOL 50 MCG/KG/MIN: 10 INJECTION, EMULSION INTRAVENOUS at 13:09

## 2023-06-30 RX ADMIN — PROPOFOL 40 MCG/KG/MIN: 10 INJECTION, EMULSION INTRAVENOUS at 20:00

## 2023-06-30 RX ADMIN — FENTANYL CITRATE 100 MCG: 50 INJECTION, SOLUTION INTRAMUSCULAR; INTRAVENOUS at 07:42

## 2023-06-30 RX ADMIN — FENTANYL CITRATE 100 MCG: 50 INJECTION, SOLUTION INTRAMUSCULAR; INTRAVENOUS at 08:33

## 2023-06-30 RX ADMIN — CEFAZOLIN SODIUM 2 G: 2 INJECTION, SOLUTION INTRAVENOUS at 23:00

## 2023-06-30 RX ADMIN — HUMAN INSULIN 1 UNITS/HR: 100 INJECTION, SOLUTION SUBCUTANEOUS at 14:38

## 2023-06-30 RX ADMIN — FAMOTIDINE 20 MG: 20 TABLET ORAL at 05:59

## 2023-06-30 RX ADMIN — NOREPINEPHRINE BITARTRATE 6.4 MCG: 1 INJECTION, SOLUTION, CONCENTRATE INTRAVENOUS at 08:04

## 2023-06-30 RX ADMIN — SODIUM CHLORIDE, SODIUM GLUCONATE, SODIUM ACETATE, POTASSIUM CHLORIDE AND MAGNESIUM CHLORIDE: 526; 502; 368; 37; 30 INJECTION, SOLUTION INTRAVENOUS at 08:08

## 2023-06-30 RX ADMIN — NOREPINEPHRINE BITARTRATE 6.4 MCG: 1 INJECTION, SOLUTION, CONCENTRATE INTRAVENOUS at 08:01

## 2023-06-30 RX ADMIN — SODIUM CHLORIDE, SODIUM GLUCONATE, SODIUM ACETATE, POTASSIUM CHLORIDE AND MAGNESIUM CHLORIDE: 526; 502; 368; 37; 30 INJECTION, SOLUTION INTRAVENOUS at 07:57

## 2023-06-30 RX ADMIN — PROPOFOL 40 MG: 10 INJECTION, EMULSION INTRAVENOUS at 14:01

## 2023-06-30 RX ADMIN — NOREPINEPHRINE BITARTRATE 6.4 MCG: 1 INJECTION, SOLUTION, CONCENTRATE INTRAVENOUS at 07:57

## 2023-06-30 RX ADMIN — NOREPINEPHRINE BITARTRATE 6.4 MCG: 1 INJECTION, SOLUTION, CONCENTRATE INTRAVENOUS at 10:06

## 2023-06-30 RX ADMIN — LIDOCAINE HYDROCHLORIDE 100 MG: 20 INJECTION, SOLUTION INFILTRATION; PERINEURAL at 07:39

## 2023-06-30 RX ADMIN — SODIUM CHLORIDE, SODIUM GLUCONATE, SODIUM ACETATE, POTASSIUM CHLORIDE AND MAGNESIUM CHLORIDE: 526; 502; 368; 37; 30 INJECTION, SOLUTION INTRAVENOUS at 07:30

## 2023-06-30 RX ADMIN — SODIUM BICARBONATE 50 MEQ: 84 INJECTION, SOLUTION INTRAVENOUS at 22:32

## 2023-06-30 RX ADMIN — ASPIRIN 81 MG CHEWABLE TABLET 162 MG: 81 TABLET CHEWABLE at 20:00

## 2023-06-30 RX ADMIN — Medication 100 MG: at 07:39

## 2023-06-30 RX ADMIN — SODIUM CHLORIDE, POTASSIUM CHLORIDE, SODIUM LACTATE AND CALCIUM CHLORIDE: 600; 310; 30; 20 INJECTION, SOLUTION INTRAVENOUS at 06:20

## 2023-06-30 RX ADMIN — NOREPINEPHRINE BITARTRATE 6.4 MCG: 1 INJECTION, SOLUTION, CONCENTRATE INTRAVENOUS at 07:45

## 2023-06-30 RX ADMIN — PROPOFOL 50 MCG/KG/MIN: 10 INJECTION, EMULSION INTRAVENOUS at 14:35

## 2023-06-30 RX ADMIN — Medication 20 MG: at 08:33

## 2023-06-30 RX ADMIN — Medication 0.06 MCG/KG/MIN: at 14:37

## 2023-06-30 RX ADMIN — Medication 2.4 UNITS/HR: at 10:40

## 2023-06-30 RX ADMIN — SODIUM CHLORIDE 7.5 G: 900 INJECTION, SOLUTION INTRAVENOUS at 08:04

## 2023-06-30 RX ADMIN — LIDOCAINE HYDROCHLORIDE 2 ML: 10 INJECTION, SOLUTION INFILTRATION; PERINEURAL at 06:50

## 2023-06-30 RX ADMIN — SODIUM CHLORIDE, POTASSIUM CHLORIDE, SODIUM LACTATE AND CALCIUM CHLORIDE 500 ML: 600; 310; 30; 20 INJECTION, SOLUTION INTRAVENOUS at 15:21

## 2023-06-30 RX ADMIN — CEFAZOLIN SODIUM 2 G: 2 INJECTION, SOLUTION INTRAVENOUS at 16:31

## 2023-06-30 RX ADMIN — ACETAMINOPHEN 975 MG: 325 TABLET, FILM COATED ORAL at 05:59

## 2023-06-30 RX ADMIN — Medication 2 UNITS: at 12:11

## 2023-06-30 RX ADMIN — NOREPINEPHRINE BITARTRATE 12.8 MCG: 1 INJECTION, SOLUTION, CONCENTRATE INTRAVENOUS at 12:22

## 2023-06-30 RX ADMIN — NOREPINEPHRINE BITARTRATE 6.4 MCG: 1 INJECTION, SOLUTION, CONCENTRATE INTRAVENOUS at 13:18

## 2023-06-30 RX ADMIN — LIDOCAINE PATCH 4% 1 PATCH: 40 PATCH TOPICAL at 15:12

## 2023-06-30 RX ADMIN — Medication 20 MG: at 10:24

## 2023-06-30 RX ADMIN — NOREPINEPHRINE BITARTRATE 6.4 MCG: 1 INJECTION, SOLUTION, CONCENTRATE INTRAVENOUS at 08:42

## 2023-06-30 RX ADMIN — NITROGLYCERIN 100 MCG: 10 INJECTION INTRAVENOUS at 12:28

## 2023-06-30 RX ADMIN — NOREPINEPHRINE BITARTRATE 12.8 MCG: 1 INJECTION, SOLUTION, CONCENTRATE INTRAVENOUS at 10:24

## 2023-06-30 RX ADMIN — CHLORHEXIDINE GLUCONATE 15 ML: 1.2 SOLUTION ORAL at 20:00

## 2023-06-30 RX ADMIN — GABAPENTIN 300 MG: 300 CAPSULE ORAL at 05:59

## 2023-06-30 RX ADMIN — EPINEPHRINE 0.03 MCG/KG/MIN: 1 INJECTION INTRAMUSCULAR; INTRAVENOUS; SUBCUTANEOUS at 14:34

## 2023-06-30 RX ADMIN — Medication 30 MG: at 11:43

## 2023-06-30 RX ADMIN — Medication 50 MCG/HR: at 20:43

## 2023-06-30 RX ADMIN — NOREPINEPHRINE BITARTRATE 6.4 MCG: 1 INJECTION, SOLUTION, CONCENTRATE INTRAVENOUS at 07:53

## 2023-06-30 ASSESSMENT — ACTIVITIES OF DAILY LIVING (ADL)
ADLS_ACUITY_SCORE: 45
ADLS_ACUITY_SCORE: 37
ADLS_ACUITY_SCORE: 45
ADLS_ACUITY_SCORE: 37
ADLS_ACUITY_SCORE: 35
ADLS_ACUITY_SCORE: 35
ADLS_ACUITY_SCORE: 37
ADLS_ACUITY_SCORE: 35
ADLS_ACUITY_SCORE: 33
ADLS_ACUITY_SCORE: 35

## 2023-06-30 NOTE — ANESTHESIA PROCEDURE NOTES
Perioperative RANULFO Procedure Note    Staff -        Anesthesiologist:  Brandin Sam MD       Resident/Fellow: Dav Patton MD       Performed By: fellow  Preanesthesia Checklist:  Patient identified, IV assessed, risks and benefits discussed, monitors and equipment assessed, procedure being performed at surgeon's request and anesthesia consent obtained.    RANULFO Probe Insertion    Probe Status PRE Insertion: NO obvious damage  Probe type:  Adult 3D  Bite block used:   Soft  Insertion Technique: Laryngoscopy, Recurrent attempts  Insertion complications: Lip Injury  Billing Report:RANULFO report by Anesthesiologist (See Separate Report note)  Probe Status POST Removal: NO obvious damage  Comments: First attempt with jaw lift with resistance. Second attempt with DL no issues. Small amount of bleeding/abrasion to upper lip either from intubation or RANULFO placement.     RANULFO Report  General Procedure Information  Images for this study have been archived.  Modalities: 2D, CW Doppler, Color flow mapping, PW Doppler and 3D  Diagnostic indications for RANULFO:   Cardiomyopathy, other  Diagnostic Indications comments: CABG.  Echocardiographic and Doppler Measurements  Right Ventricle:  Cavity size normal.    Hypertrophy not present.   Thrombus not present.    Global function normal.     Left Ventricle:  Cavity size normal.    Hypertrophy not present.   Thrombus not present.   Global Function normal.   Ejection Fraction 50%.        Valves  Aortic Valve: Annulus normal.  Stenosis not present.  Regurgitation absent.  Leaflets normal.  Leaflet motions normal.    Mitral Valve: Annulus normal.  Stenosis not present.  Regurgitation absent.  Leaflets normal.  Leaflet motions normal.    Tricuspid Valve: Annulus normal.  Stenosis not present.  Regurgitation absent.  Leaflets normal.  Leaflet motions normal.    Pulmonic Valve: Annulus normal.  Stenosis not present.  Regurgitation absent.      Aorta: Ascending Aorta: Size normal.   Dissection not present.  Mobile plaque not present.    Aortic Arch: Size normal.    Dissection not present.   Plaque thickness greater than 3 mm.   Mobile plaque not present.    Descending Aorta: Size normal.    Dissection not present.    Mobile plaque not present.      Right Atrium:  Size normal.   Spontaneous echo contrast not present.   Thrombus not present.   Tumor not present.   Device not present.     Left Atrium: Size normal.  Spontaneous echo contrast not present.  Thrombus not present.  Tumor not present.  Device not present.    Left atrial appendage normal.     Atrial Septum: Intra-atrial septal morphology normal.       Ventricular Septum: Intra-ventricular septum morphology normal.        Other Findings:   Pericardium:  normal. Pleural Effusion:  none. Pulmonary Arteries:  normal.  Cornoary sinus catheter present.    Post Intervention Findings  Procedure(s) performed:  CABG.  Regional wall motion:. Surgeon(s) notified of all postintervention findings: Yes.             Post Intervention comments: Post - CABG  Normal biventricular function. No RWMA. EF now 50-55% (on low dose inotropes). There is no new valvular pathology. No aortic dissection. .    Echocardiogram Comments  Echocardiogram comments: Pre - LVEF 45-50%. Normal LV diastolic function.  AV: tri-leaflet. No stenosis. No regurgitation.   MV: normal   TV: normal  PV: normal  Aorta: Small plaque located on the aortic arch. Ascending aorta is normal. No aortic dissection.  No PFO by CFD  No clot in GILBERT.   All finding communicated to the surgery team..

## 2023-06-30 NOTE — ANESTHESIA PROCEDURE NOTES
Central Line/PA Catheter Placement    Pre-Procedure   Staff -        Anesthesiologist:  Brandin Sam MD       Resident/Fellow: Luigi Molina MD       Performed By: resident       Location: OR       Pre-Anesthestic Checklist: patient identified, IV checked, site marked, risks and benefits discussed, informed consent, monitors and equipment checked, pre-op evaluation and at physician/surgeon's request  Timeout:       Correct Patient: Yes        Correct Procedure: Yes        Correct Site: Yes        Correct Position: Yes        Correct Laterality: Yes   Line Placement:   This line was placed Post Induction    Procedure   Procedure: central line       Diagnosis: HD monitoring and central access       Laterality: right       Insertion Site: internal jugular.       Patient Position: Trendelenburg and supine  Sterile Prep        All elements of maximal sterile barrier technique followed       Patient Prep/Sterile Barriers: draped, hand hygiene, gloves , hat , mask , draped, gown, sterile gel and probe cover       Skin prep: Chloraprep  Insertion/Injection        Technique: ultrasound guided and Seldinger Technique        1. Ultrasound was used to evaluate the access site.       2. Vein evaluated via ultrasound for patency/adequacy.       3. Using real-time ultrasound the needle/catheter was observed entering the artery/vein.       Introducer Type: 9 Fr, 2-lumen MAC        Type: PA/CVC with Introducer       Catheter Size: 9 Fr       Catheter Length: 12       Number of Lumens: double lumen       PA Catheter Type: CCO         Appropriate RV, RA and PA waveforms noted:  Yes            Distance to Wedge Position cm: 58            Withdrawn and Locked at cm: 58            Balloon down at end of the procedure:   Narrative         Secured by: suture       Tegaderm and Biopatch dressing used.       Complications: None apparent,        blood aspirated from all lumens,        All lumens flushed: Yes       Verification method:  Ultrasound and RANULFO

## 2023-06-30 NOTE — ANESTHESIA PROCEDURE NOTES
Airway       Patient location during procedure: OR       Procedure Start/Stop Times: 6/30/2023 7:41 AM  Staff -        Anesthesiologist:  Brandin Sam MD       Resident/Fellow: Luigi Molina MD       Performed By: resident  Consent for Airway        Urgency: elective  Indications and Patient Condition       Indications for airway management: jack-procedural       Induction type:intravenous       Mask difficulty assessment: 1 - vent by mask    Final Airway Details       Final airway type: endotracheal airway       Successful airway: ETT - single  Endotracheal Airway Details        ETT size (mm): 7.5       Cuffed: yes       Successful intubation technique: direct laryngoscopy       DL Blade Type: MAC 4       Grade View of Cords: 2       Adjucts: stylet       Position: Right       Measured from: lips       Secured at (cm): 24       Bite block used: Soft    Post intubation assessment        Placement verified by: capnometry, equal breath sounds and chest rise        Number of attempts at approach: 1       Number of other approaches attempted: 0       Secured with: pink tape       Ease of procedure: easy       Dentition: Unchanged    Medication(s) Administered   Medication Administration Time: 6/30/2023 7:41 AM

## 2023-06-30 NOTE — PROGRESS NOTES
Admitted/transferred from: OR  Reason for admission/transfer: Post-op CABG  2 RN skin assessment: completed by Smita Chambers Sarah  Result of skin assessment and interventions/actions: midline chest incision (covered), mepilex on sacrum (preventitive)  Height, weight, drug calc weight:   Patient belongings (see Flowsheet)  MDRO education added to care plan  ?

## 2023-06-30 NOTE — PLAN OF CARE
Major Shift Events:  Pt arrived from the OR around 1400 after CABG x3. VSS, afebrile. Followed commands, moved all extremities with sedation holiday. Lactic acid as high as 5.7. So far pt has received 1.5L of LR. Will recheck lactic and abg after current fluid is finished. Chest tube output 20-50cc/hr. UOP 50cc/hr. Pt's swan coiled in the PA, team attempted repositioning but was unsuccessful so swan was removed d/t ectopy. Pt's wife at bedside and was updated by the team.     Plan: Potential for extubation tonight if lactic downtrends    For vital signs and complete assessments, please see documentation flowsheets.

## 2023-06-30 NOTE — PHARMACY-ADMISSION MEDICATION HISTORY
Pharmacist Admission Medication History    Admission medication history is complete. The information provided in this note is only as accurate as the sources available at the time of the update.    Medication reconciliation/reorder completed by provider prior to medication history? No    Information Source(s): Clinic records, Hospital records and CareEverywhere/SureScripts via chart review    Pertinent Information: none    Changes made to PTA medication list:    Added: None    Deleted: None    Changed: None    Medication Affordability:       Allergies reviewed with patient and updates made in EHR: no changes/updates noted via chart review     Medications have been reviewed by me and are current to the best of my knowledge and ability.    Medication History Completed By: Viky Masters PharmD  6/30/2023 5:26 PM    Prior to Admission medications    Medication Sig Last Dose Taking? Auth Provider Long Term End Date   aspirin 81 MG EC tablet Take 81 mg by mouth daily 6/29/2023 at AM Yes Reported, Patient     metoprolol succinate ER (TOPROL XL) 25 MG 24 hr tablet Take 0.5 tablets (12.5 mg) by mouth daily 6/30/2023 at AM Yes Flavia Ibarra MD Yes    Multiple Vitamin (MULTI-VITAMIN DAILY) TABS Take 1 tablet by mouth daily Past Month at AM Yes Reported, Patient     rosuvastatin (CRESTOR) 20 MG tablet Take 1 tablet (20 mg) by mouth daily 6/29/2023 at AM Yes Flavia Ibarra MD Yes    nitroGLYcerin (NITROSTAT) 0.4 MG sublingual tablet For chest pain place 1 tablet under the tongue every 5 minutes for 3 doses. If symptoms persist 5 minutes after 1st dose call 911.   Flavia Ibarra MD Yes

## 2023-06-30 NOTE — H&P
CV ICU H&P  6/30/2023      CO-MORBIDITIES:   Patient Active Problem List   Diagnosis     CARDIOVASCULAR SCREENING; LDL GOAL LESS THAN 130     Tobacco abuse     Thyroid nodule     Mixed hyperlipidemia     Status post coronary angiogram     Recurrent chest pain     Abnormal nuclear stress test     Left ventricular hypertrophy by electrocardiogram     Coronary artery disease       ASSESSMENT: Jerald Lawrence is a 62 year old male with PMH of hx of tobacco abuse. HFrEF (35%), HTN, HLD, CAD who underwent CABG x 3 on 6/30/2023 by Dr. Ferrer.    CABG 3  Easy airway  RIJ MAC  PAC  Right radial arterial line  Shock x2  Intrinsic gin   VVI 50 backup  No products  Cell saver 175  3 L IVF  480 UOP  CTx3    Pre - LVEF 45-50%. Normal LV diastolic function    Post - CABG  Normal biventricular function. No RWMA. EF now 50-55% (on low dose inotropes). There is no new valvular pathology. No aortic dissection    PLAN:  Neuro/ pain/ sedation:  Acute Postoperative pain  - Monitor neurological status. Notify the MD for any acute changes in exam.  - Pain: fentanyl gtt. Scheduled tylenol. PRN tylenol, oxycodone, dilaudid.  - Sedation: propofol gtt    Pulmonary care:   Postoperative ventilation management  Hx tobacco substance abuse      500  14  5  50%    - Intubated, ventilated  - Titrate supplemental oxygen to maintain saturation above 92%.  - Pulmonary hygiene: Incentive spirometer every 15- 30 minutes when awake, flutter valve, C&DB  - Daily SBT  - Fast track candidate    Cardiovascular:    CAD s/p CABG x 3 on 6/30 by Dr. Ferrer  HTN  HLD  HFrEF (LVEF 35%)  - Monitor hemodynamic status.  - Epi, norepi, vaso gtt; wean as tolerated   - Goal MAP>65, SBP<140  - Hold  mg daily  - Hold PTA metoprolol 12.5 daily  - Hold PTA rosuvastatin 20 mg daily    GI care/ Nutrition:   - NPO   - PPI  - Bowel regimen: miralax, senna    Renal/ Fluid Balance/ Electrolytes:   BL creat appears to be ~ 0.8-1.00  - Strict I/O, daily weights  - Avoid/limit  nephrotoxins as able  - Replete lytes PRN per protocol  - Daily BMP    Endocrine:    Stress induced hyperglycemia  Preop A1c 5.4  - Insulin gtt  - Goal BG <180 for optimal healing    ID/ Antibiotics:  Stress induced leukocytosis  - To complete perioperative regimen  - Continue to monitor fever curve, WBC and inflammatory markers as appropriate    Heme:     Stress induced leukocytosis  Acute blood loss anemia  Acute blood loss thrombocytopenia  No s/sx active bleeding  - Continue to monitor  - CBC     MSK/ Skin:  Sternotomy  Surgical Incision  - Sternal precautions  - Postoperative incision management per protocol  - PT/OT/CR    Prophylaxis:    - Mechanical prophylaxis for DVT  - Chemical DVT prophylaxis - start subcutaneous heparin tomorrow  - PPI    Lines/ tubes/ drains:  - Arterial Line, ETT, CTs, PA catheter, RIJ, godoy, OG    Disposition:  - CVICU      Patient seen, findings and plan discussed with CVICU staff    Zechariah Otoole MD  Anesthesiology PGY-3    ====================================    HPI:   Returns from the operating room intubated and sedated.    PAST MEDICAL HISTORY:   Past Medical History:   Diagnosis Date     Coronary artery disease 6/30/2023     Unspecified part of closed fracture of clavicle     comminuted displaced fx of left clavicle       PAST SURGICAL HISTORY:   Past Surgical History:   Procedure Laterality Date     CV CORONARY ANGIOGRAM N/A 5/31/2023    Procedure: Coronary Angiogram;  Surgeon: Alvaro Acosta MD;  Location: [facility] CARDIAC CATH LAB     CV LEFT HEART CATH N/A 5/31/2023    Procedure: Left Heart Catheterization;  Surgeon: Alvaro Acosta MD;  Location: [facility] CARDIAC CATH LAB     CV LEFT VENTRICULOGRAM N/A 5/31/2023    Procedure: Left Ventriculogram;  Surgeon: Alvaro Acosta MD;  Location: [facility] CARDIAC CATH LAB     CV PCI N/A 5/31/2023    Procedure: Percutaneous Coronary Intervention;  Surgeon: Alvaro Acosta MD;  Location: [facility] CARDIAC  "CATH LAB       FAMILY HISTORY:   Family History   Problem Relation Age of Onset     Cancer - colorectal Father      LASHAE.A.MILA. Father      Blood Disease Brother      Cardiovascular Brother      LASHAETENATU. Brother        SOCIAL HISTORY:   Social History     Tobacco Use     Smoking status: Former     Packs/day: 0.75     Years: 35.00     Pack years: 26.25     Types: Cigarettes     Start date:      Quit date: 2023     Years since quittin.4     Passive exposure: Never     Smokeless tobacco: Former     Types: Chew     Quit date:    Substance Use Topics     Alcohol use: Yes     Comment: rare         OBJECTIVE:   1. VITAL SIGNS:      Vital signs:    Temp: 98.1  F (36.7  C)   BP: (!) 128/95 Pulse: 76   Resp: 14 SpO2: 99 % O2 Device: None (Room air)   Height: 177.8 cm (5' 10\") Weight: 89.8 kg (197 lb 15.6 oz)  Estimated body mass index is 28.41 kg/m  as calculated from the following:    Height as of this encounter: 1.778 m (5' 10\").    Weight as of this encounter: 89.8 kg (197 lb 15.6 oz).      2. INTAKE/ OUTPUT:     Intake/Output Summary (Last 24 hours) at 2023 1428  Last data filed at 2023 1400  Gross per 24 hour   Intake 2675 ml   Output 1640 ml   Net 1035 ml        3. PHYSICAL EXAMINATION:     General: laying in bed, appears comfortabel  Neuro: sedated, non-responsive to verbal commands  Resp: intubated, mechanical breath sounds bilateral, no wheezing, symmetrical chest rise  CV: regular rate and rhythm    Abdomen: soft, non-distended  Incisions: c/d/i  Extremities: warm and well perfused, LLE wrapped in ACE  CT: to suction, serosang output, no airleak, no crepitus    4. INVESTIGATIONS:   Arterial Blood Gases   Recent Labs   Lab 23  1253 23  1226 23  1144 23  1114   PH 7.29* 7.33* 7.32* 7.35   PCO2 46* 46* 44 45   PO2 200* 151* 251* 311*   HCO3 22 25 22 25     Complete Blood Count   Recent Labs   Lab 23  1412 23  1253 23  1230 23  1226 23  0804 " 06/30/23  0545   WBC 24.0*  --  19.5*  --   --  7.6   HGB 12.1* 11.3* 12.0* 12.6*   < > 14.9     --  228  --   --  258    < > = values in this interval not displayed.     Basic Metabolic Panel  Recent Labs   Lab 06/30/23  1400 06/30/23  1253 06/30/23  1226 06/30/23  1144 06/30/23  1114 06/30/23  0804 06/30/23  0545   NA  --  141 139 141 138   < > 140   POTASSIUM  --  3.4* 4.3 5.4* 5.2*   < > 5.2   CHLORIDE  --   --   --   --   --   --  104   CO2  --   --   --   --   --   --  25   BUN  --   --   --   --   --   --  15.9   CR  --   --   --   --   --   --  0.84   * 134* 155* 189* 169*   < > 88  88    < > = values in this interval not displayed.     Liver Function Tests  Recent Labs   Lab 06/30/23  1230 06/30/23  0545   INR 1.39* 1.05     Pancreatic Enzymes  No lab results found in last 7 days.  Coagulation Profile  Recent Labs   Lab 06/30/23  1230 06/30/23  0545   INR 1.39* 1.05   PTT 29  --          5. RADIOLOGY:   Recent Results (from the past 24 hour(s))   RANULFO with Report    Narrative    Dav Patton MD     6/30/2023 12:46 PM  Perioperative RANULFO Procedure Note    Staff -        Anesthesiologist:  Brandin Sam MD       Resident/Fellow: Dav Patton MD       Performed By: fellow  Preanesthesia Checklist:  Patient identified, IV assessed, risks and   benefits discussed, monitors and equipment assessed, procedure being   performed at surgeon's request and anesthesia consent obtained.    RANULFO Probe Insertion    Probe Status PRE Insertion: NO obvious damage  Probe type:  Adult 3D  Bite block used:   Soft  Insertion Technique: Laryngoscopy, Recurrent attempts  Insertion complications: Lip Injury  Billing Report:RANULFO report by Anesthesiologist (See Separate Report note)  Probe Status POST Removal: NO obvious damage  Comments: First attempt with jaw lift with resistance. Second attempt with   DL no issues. Small amount of bleeding/abrasion to upper lip either from   intubation or RANULFO  placement.     RANULFO Report  General Procedure Information  Images for this study have been archived.  Modalities: 2D, CW Doppler, Color flow mapping, PW Doppler and 3D  Diagnostic indications for RANULFO:   Cardiomyopathy, other  Diagnostic Indications comments: CABG.  Echocardiographic and Doppler Measurements  Right Ventricle:  Cavity size normal.    Hypertrophy not present.     Thrombus not present.    Global function normal.     Left Ventricle:  Cavity size normal.    Hypertrophy not present.     Thrombus not present.   Global Function normal.   Ejection Fraction 50%.        Valves  Aortic Valve: Annulus normal.  Stenosis not present.  Regurgitation   absent.  Leaflets normal.  Leaflet motions normal.    Mitral Valve: Annulus normal.  Stenosis not present.  Regurgitation   absent.  Leaflets normal.  Leaflet motions normal.    Tricuspid Valve: Annulus normal.  Stenosis not present.  Regurgitation   absent.  Leaflets normal.  Leaflet motions normal.    Pulmonic Valve: Annulus normal.  Stenosis not present.  Regurgitation   absent.      Aorta: Ascending Aorta: Size normal.  Dissection not present.  Mobile   plaque not present.    Aortic Arch: Size normal.    Dissection not present.   Plaque thickness   greater than 3 mm.   Mobile plaque not present.    Descending Aorta: Size normal.    Dissection not present.    Mobile plaque   not present.      Right Atrium:  Size normal.   Spontaneous echo contrast not present.     Thrombus not present.   Tumor not present.   Device not present.     Left Atrium: Size normal.  Spontaneous echo contrast not present.    Thrombus not present.  Tumor not present.  Device not present.    Left atrial appendage normal.     Atrial Septum: Intra-atrial septal morphology normal.       Ventricular Septum: Intra-ventricular septum morphology normal.        Other Findings:   Pericardium:  normal. Pleural Effusion:  none. Pulmonary Arteries:    normal.  Cornoary sinus catheter present.    Post  Intervention Findings  Procedure(s) performed:  CABG.  Regional wall motion:. Surgeon(s) notified   of all postintervention findings: Yes.             Post Intervention comments: Post - CABG  Normal biventricular function. No RWMA. EF now 50-55% (on low dose   inotropes). There is no new valvular pathology. No aortic dissection. .    Echocardiogram Comments  Echocardiogram comments: Pre - LVEF 45-50%. Normal LV diastolic function.  AV: tri-leaflet. No stenosis. No regurgitation.   MV: normal   TV: normal  PV: normal  Aorta: Small plaque located on the aortic arch. Ascending aorta is normal.   No aortic dissection.  No PFO by CFD  No clot in GILBERT.   All finding communicated to the surgery team..       =========================================

## 2023-06-30 NOTE — ANESTHESIA PROCEDURE NOTES
Arterial Line Procedure Note    Pre-Procedure   Staff -        Anesthesiologist:  Brandin Sam MD       Resident/Fellow: Luigi Molina MD       Performed By: resident       Location: OR       Pre-Anesthestic Checklist: patient identified, IV checked, risks and benefits discussed, informed consent, monitors and equipment checked, pre-op evaluation and at physician/surgeon's request  Timeout:       Correct Patient: Yes        Correct Procedure: Yes        Correct Site: Yes        Correct Position: Yes   Line Placement:   This line was placed Pre Induction starting at 6/30/2023 6:50 AM and ending at 6/30/2023 7:00 AM  Procedure   Procedure: arterial line       Diagnosis: hemodynamic monitoring       Laterality: right       Insertion Site: radial.  Sterile Prep        Standard elements of sterile barrier followed       Skin prep: Chloraprep  Insertion/Injection        Technique: ultrasound guided and Seldinger Technique        1. Ultrasound was used to evaluate the access site.       2. Artery evaluated via ultrasound for patency/adequacy.       3. Using real-time ultrasound the needle/catheter was observed entering the artery/vein.       Catheter Type/Size: 20 G, 12 cm  Narrative         Secured by: anchor securement device       Tegaderm dressing used.       Complications: None apparent,        Arterial waveform: Yes        IBP within 10% of NIBP: Yes

## 2023-06-30 NOTE — PROGRESS NOTES
CLINICAL NUTRITION SERVICES - BRIEF NOTE    Received provider consult for nutrition education with comments post op cardiovascular surgery (automatic consult on post-op order set). S/p CABG x3 on 6/30. Pt just arrived to unit. Nutrition education to be completed as able/appropriate (as pt s/p CABG and/or initial VAD).    RD will follow per LOS protocol or if re-consulted.     Deisi Louis RD, LD  Pager: [phone]

## 2023-06-30 NOTE — ANESTHESIA CARE TRANSFER NOTE
Patient: Jerald Lawrence    Procedure: Procedure(s):  Median Sternotomy, Cardiopulmonary Byass, Endovascular Greater Saphenous Vein [place] Left Leg, Coronary Artery Bypass Grafting x 3, IntraoperativeTransesophageal Echocardiogram (Anesthsia)       Diagnosis: CAD (coronary artery disease) [I25.10]  Diagnosis Additional Information: No value filed.    Anesthesia Type:   General     Note:    Oropharynx: ventilatory support and endotracheal tube in place  Level of Consciousness: iatrogenic sedation      Independent Airway: airway patency not satisfactory and stable  Dentition: dentition unchanged  Vital Signs Stable: post-procedure vital signs reviewed and stable  Report to RN Given: handoff report given  Patient transferred to: ICU    ICU Handoff: Call for PAUSE to initiate/utilize ICU HANDOFF, Identified Patient, Identified Responsible Provider, Reviewed the Pertinent Medical History, Discussed Surgical Course, Reviewed Intra-OP Anesthesia Management and Issues during Anesthesia, Set Expectations for Post Procedure Period and Allowed Opportunity for Questions and Acknowledgement of Understanding      Vitals:  Vitals Value Taken Time   BP     Temp 36.7  C (98.06  F) 06/30/23 1412   Pulse 76 06/30/23 1412   Resp 13 06/30/23 1412   SpO2 98 % 06/30/23 1412   Vitals shown include unvalidated device data.    Electronically Signed By: Luigi Molina MD  June 30, 2023  2:13 PM

## 2023-06-30 NOTE — BRIEF OP NOTE
[facility]    Brief Operative Note    Pre-operative diagnosis: CAD (coronary artery disease) [I25.10]  Post-operative diagnosis Same    Procedure: Procedure(s):  Median Sternotomy, Cardiopulmonary Byass, Endovascular Greater Saphenous Vein [place] Left Leg, Coronary Artery Bypass Grafting x 3, IntraoperativeTransesophageal Echocardiogram (Anesthsia)  Surgeon: Surgeon(s) and Role:     * Sushant Ferrer MD - Primary     * Dorinda Ann PA-C - Assisting     * Maria Teresa Serna MD - Fellow - Assisting  Anesthesia: General   Estimated Blood Loss: 1000 mls    Drains: 32 Fr meds x 2, 28 Fr angled left pleural   Specimens: None  Findings:  Per op report  Complications: None.  Implants: None

## 2023-06-30 NOTE — ANESTHESIA POSTPROCEDURE EVALUATION
Patient: Jerald Lawrence    Procedure: Procedure(s):  Median Sternotomy, Cardiopulmonary Byass, Endovascular Greater Saphenous Vein [place] Left Leg, Coronary Artery Bypass Grafting x 3, IntraoperativeTransesophageal Echocardiogram (Anesthsia)       Anesthesia Type:  General    Note:  Disposition: ICU            ICU Sign Out: Anesthesiologist/ICU physician sign out WAS performed   Postop Pain Control: Uneventful            Sign Out: Well controlled pain   PONV: No   Neuro/Psych: Uneventful            Sign Out: PLANNED postop sedation   Airway/Respiratory: Uneventful            Sign Out: AIRWAY IN SITU/Resp. Support               Airway in situ/Resp. Support: ETT                 Reason: Planned Pre-op   CV/Hemodynamics: Uneventful            Sign Out: Acceptable CV status; No obvious hypovolemia; No obvious fluid overload   Other NRE:    DID A NON-ROUTINE EVENT OCCUR?            Last vitals:  Vitals:    06/30/23 1515 06/30/23 1530 06/30/23 1545   BP:      Pulse: 80 80 81   Resp: 18 17 15   Temp: 36.7  C (98.1  F) 36.8  C (98.2  F) 36.8  C (98.2  F)   SpO2: 100% 99% 99%       Electronically Signed By: Willard Umaña MD  June 30, 2023  3:55 PM

## 2023-07-01 ENCOUNTER — APPOINTMENT (OUTPATIENT)
Dept: GENERAL RADIOLOGY | Facility: CLINIC | Age: 63
DRG: 235 | End: 2023-07-01
Attending: STUDENT IN AN ORGANIZED HEALTH CARE EDUCATION/TRAINING PROGRAM
Payer: COMMERCIAL

## 2023-07-01 ENCOUNTER — APPOINTMENT (OUTPATIENT)
Dept: OCCUPATIONAL THERAPY | Facility: CLINIC | Age: 63
DRG: 235 | End: 2023-07-01
Attending: STUDENT IN AN ORGANIZED HEALTH CARE EDUCATION/TRAINING PROGRAM
Payer: COMMERCIAL

## 2023-07-01 LAB
ALBUMIN SERPL BCG-MCNC: 3.3 G/DL (ref 3.5–5.2)
ALLEN'S TEST: ABNORMAL
ALLEN'S TEST: ABNORMAL
ALLEN'S TEST: YES
ALP SERPL-CCNC: 63 U/L (ref 40–129)
ALT SERPL W P-5'-P-CCNC: 23 U/L (ref 0–70)
ANION GAP SERPL CALCULATED.3IONS-SCNC: 9 MMOL/L (ref 7–15)
AST SERPL W P-5'-P-CCNC: 44 U/L (ref 0–45)
BASE EXCESS BLDA CALC-SCNC: -1.4 MMOL/L (ref -9–1.8)
BASE EXCESS BLDA CALC-SCNC: 2.6 MMOL/L (ref -9–1.8)
BASE EXCESS BLDA CALC-SCNC: 4.6 MMOL/L (ref -9–1.8)
BASE EXCESS BLDV CALC-SCNC: 4.4 MMOL/L (ref -7.7–1.9)
BILIRUB SERPL-MCNC: 0.3 MG/DL
BUN SERPL-MCNC: 17.9 MG/DL (ref 8–23)
CA-I BLD-MCNC: 4.5 MG/DL (ref 4.4–5.2)
CALCIUM SERPL-MCNC: 8.4 MG/DL (ref 8.8–10.2)
CHLORIDE SERPL-SCNC: 104 MMOL/L (ref 98–107)
CREAT SERPL-MCNC: 0.87 MG/DL (ref 0.67–1.17)
DEPRECATED HCO3 PLAS-SCNC: 25 MMOL/L (ref 22–29)
ERYTHROCYTE [DISTWIDTH] IN BLOOD BY AUTOMATED COUNT: 12.2 % (ref 10–15)
GFR SERPL CREATININE-BSD FRML MDRD: >90 ML/MIN/1.73M2
GLUCOSE BLDC GLUCOMTR-MCNC: 103 MG/DL (ref 70–99)
GLUCOSE BLDC GLUCOMTR-MCNC: 105 MG/DL (ref 70–99)
GLUCOSE BLDC GLUCOMTR-MCNC: 106 MG/DL (ref 70–99)
GLUCOSE BLDC GLUCOMTR-MCNC: 109 MG/DL (ref 70–99)
GLUCOSE BLDC GLUCOMTR-MCNC: 111 MG/DL (ref 70–99)
GLUCOSE BLDC GLUCOMTR-MCNC: 134 MG/DL (ref 70–99)
GLUCOSE BLDC GLUCOMTR-MCNC: 138 MG/DL (ref 70–99)
GLUCOSE BLDC GLUCOMTR-MCNC: 142 MG/DL (ref 70–99)
GLUCOSE BLDC GLUCOMTR-MCNC: 144 MG/DL (ref 70–99)
GLUCOSE BLDC GLUCOMTR-MCNC: 152 MG/DL (ref 70–99)
GLUCOSE BLDC GLUCOMTR-MCNC: 99 MG/DL (ref 70–99)
GLUCOSE SERPL-MCNC: 135 MG/DL (ref 70–99)
HCO3 BLD-SCNC: 23 MMOL/L (ref 21–28)
HCO3 BLD-SCNC: 26 MMOL/L (ref 21–28)
HCO3 BLD-SCNC: 28 MMOL/L (ref 21–28)
HCO3 BLDV-SCNC: 29 MMOL/L (ref 21–28)
HCT VFR BLD AUTO: 33.6 % (ref 40–53)
HGB BLD-MCNC: 11.4 G/DL (ref 13.3–17.7)
LACTATE SERPL-SCNC: 0.8 MMOL/L (ref 0.7–2)
LACTATE SERPL-SCNC: 1.8 MMOL/L (ref 0.7–2)
MAGNESIUM SERPL-MCNC: 2 MG/DL (ref 1.7–2.3)
MAGNESIUM SERPL-MCNC: 2.3 MG/DL (ref 1.7–2.3)
MCH RBC QN AUTO: 29.5 PG (ref 26.5–33)
MCHC RBC AUTO-ENTMCNC: 33.9 G/DL (ref 31.5–36.5)
MCV RBC AUTO: 87 FL (ref 78–100)
O2/TOTAL GAS SETTING VFR VENT: 2 %
O2/TOTAL GAS SETTING VFR VENT: 2 %
O2/TOTAL GAS SETTING VFR VENT: 28 %
O2/TOTAL GAS SETTING VFR VENT: 40 %
OXYHGB MFR BLD: 95 % (ref 92–100)
OXYHGB MFR BLD: 97 % (ref 92–100)
OXYHGB MFR BLD: 98 % (ref 92–100)
OXYHGB MFR BLDV: 63 % (ref 70–75)
PCO2 BLD: 37 MM HG (ref 35–45)
PCO2 BLDV: 44 MM HG (ref 40–50)
PH BLD: 7.4 [PH] (ref 7.35–7.45)
PH BLD: 7.46 [PH] (ref 7.35–7.45)
PH BLD: 7.49 [PH] (ref 7.35–7.45)
PH BLDV: 7.43 [PH] (ref 7.32–7.43)
PHOSPHATE SERPL-MCNC: 3.2 MG/DL (ref 2.5–4.5)
PLATELET # BLD AUTO: 198 10E3/UL (ref 150–450)
PO2 BLD: 113 MM HG (ref 80–105)
PO2 BLD: 71 MM HG (ref 80–105)
PO2 BLD: 86 MM HG (ref 80–105)
PO2 BLDV: 31 MM HG (ref 25–47)
POTASSIUM SERPL-SCNC: 4.1 MMOL/L (ref 3.4–5.3)
PROT SERPL-MCNC: 5.3 G/DL (ref 6.4–8.3)
RBC # BLD AUTO: 3.86 10E6/UL (ref 4.4–5.9)
SODIUM SERPL-SCNC: 138 MMOL/L (ref 136–145)
WBC # BLD AUTO: 15.9 10E3/UL (ref 4–11)

## 2023-07-01 PROCEDURE — 250N000013 HC RX MED GY IP 250 OP 250 PS 637

## 2023-07-01 PROCEDURE — 83735 ASSAY OF MAGNESIUM: CPT | Performed by: STUDENT IN AN ORGANIZED HEALTH CARE EDUCATION/TRAINING PROGRAM

## 2023-07-01 PROCEDURE — 214N000001 HC R&B CCU UMMC

## 2023-07-01 PROCEDURE — 258N000003 HC RX IP 258 OP 636: Performed by: SURGERY

## 2023-07-01 PROCEDURE — 71045 X-RAY EXAM CHEST 1 VIEW: CPT | Mod: 26 | Performed by: RADIOLOGY

## 2023-07-01 PROCEDURE — 83605 ASSAY OF LACTIC ACID: CPT | Performed by: SURGERY

## 2023-07-01 PROCEDURE — 85014 HEMATOCRIT: CPT

## 2023-07-01 PROCEDURE — 82805 BLOOD GASES W/O2 SATURATION: CPT | Performed by: STUDENT IN AN ORGANIZED HEALTH CARE EDUCATION/TRAINING PROGRAM

## 2023-07-01 PROCEDURE — 97535 SELF CARE MNGMENT TRAINING: CPT | Mod: GO | Performed by: OCCUPATIONAL THERAPIST

## 2023-07-01 PROCEDURE — 250N000013 HC RX MED GY IP 250 OP 250 PS 637: Performed by: STUDENT IN AN ORGANIZED HEALTH CARE EDUCATION/TRAINING PROGRAM

## 2023-07-01 PROCEDURE — 97166 OT EVAL MOD COMPLEX 45 MIN: CPT | Mod: GO | Performed by: OCCUPATIONAL THERAPIST

## 2023-07-01 PROCEDURE — 999N000128 HC STATISTIC PERIPHERAL IV START W/O US GUIDANCE

## 2023-07-01 PROCEDURE — 93005 ELECTROCARDIOGRAM TRACING: CPT

## 2023-07-01 PROCEDURE — 82330 ASSAY OF CALCIUM: CPT | Performed by: STUDENT IN AN ORGANIZED HEALTH CARE EDUCATION/TRAINING PROGRAM

## 2023-07-01 PROCEDURE — 99233 SBSQ HOSP IP/OBS HIGH 50: CPT | Mod: 24 | Performed by: ANESTHESIOLOGY

## 2023-07-01 PROCEDURE — 250N000011 HC RX IP 250 OP 636: Mod: JZ | Performed by: SURGERY

## 2023-07-01 PROCEDURE — 999N000127 HC STATISTIC PERIPHERAL IV START W US GUIDANCE

## 2023-07-01 PROCEDURE — 999N000065 XR CHEST PORT 1 VIEW

## 2023-07-01 PROCEDURE — 999N000157 HC STATISTIC RCP TIME EA 10 MIN

## 2023-07-01 PROCEDURE — 80053 COMPREHEN METABOLIC PANEL: CPT

## 2023-07-01 PROCEDURE — 84100 ASSAY OF PHOSPHORUS: CPT | Performed by: STUDENT IN AN ORGANIZED HEALTH CARE EDUCATION/TRAINING PROGRAM

## 2023-07-01 PROCEDURE — 97530 THERAPEUTIC ACTIVITIES: CPT | Mod: GO | Performed by: OCCUPATIONAL THERAPIST

## 2023-07-01 PROCEDURE — 999N000259 HC STATISTIC EXTUBATION

## 2023-07-01 PROCEDURE — 250N000011 HC RX IP 250 OP 636: Performed by: STUDENT IN AN ORGANIZED HEALTH CARE EDUCATION/TRAINING PROGRAM

## 2023-07-01 PROCEDURE — 82805 BLOOD GASES W/O2 SATURATION: CPT

## 2023-07-01 RX ORDER — NICOTINE POLACRILEX 4 MG
15-30 LOZENGE BUCCAL
Status: DISCONTINUED | OUTPATIENT
Start: 2023-07-01 | End: 2023-07-03

## 2023-07-01 RX ORDER — MAGNESIUM SULFATE HEPTAHYDRATE 40 MG/ML
2 INJECTION, SOLUTION INTRAVENOUS ONCE
Status: COMPLETED | OUTPATIENT
Start: 2023-07-01 | End: 2023-07-01

## 2023-07-01 RX ORDER — DEXTROSE MONOHYDRATE 25 G/50ML
25-50 INJECTION, SOLUTION INTRAVENOUS
Status: DISCONTINUED | OUTPATIENT
Start: 2023-07-01 | End: 2023-07-03

## 2023-07-01 RX ADMIN — GABAPENTIN 100 MG: 100 CAPSULE ORAL at 19:52

## 2023-07-01 RX ADMIN — LIDOCAINE PATCH 4% 1 PATCH: 40 PATCH TOPICAL at 15:26

## 2023-07-01 RX ADMIN — HEPARIN SODIUM 5000 UNITS: 5000 INJECTION, SOLUTION INTRAVENOUS; SUBCUTANEOUS at 19:52

## 2023-07-01 RX ADMIN — CHLORHEXIDINE GLUCONATE 15 ML: 1.2 SOLUTION ORAL at 08:18

## 2023-07-01 RX ADMIN — GABAPENTIN 100 MG: 100 CAPSULE ORAL at 08:17

## 2023-07-01 RX ADMIN — OXYCODONE HYDROCHLORIDE 5 MG: 5 TABLET ORAL at 08:15

## 2023-07-01 RX ADMIN — POLYETHYLENE GLYCOL 3350 17 G: 17 POWDER, FOR SOLUTION ORAL at 08:15

## 2023-07-01 RX ADMIN — OXYCODONE HYDROCHLORIDE 10 MG: 10 TABLET ORAL at 20:48

## 2023-07-01 RX ADMIN — SODIUM CHLORIDE, POTASSIUM CHLORIDE, SODIUM LACTATE AND CALCIUM CHLORIDE 500 ML: 600; 310; 30; 20 INJECTION, SOLUTION INTRAVENOUS at 01:03

## 2023-07-01 RX ADMIN — ACETAMINOPHEN 975 MG: 325 TABLET, FILM COATED ORAL at 15:25

## 2023-07-01 RX ADMIN — CEFAZOLIN SODIUM 2 G: 2 INJECTION, SOLUTION INTRAVENOUS at 10:17

## 2023-07-01 RX ADMIN — SENNOSIDES AND DOCUSATE SODIUM 1 TABLET: 50; 8.6 TABLET ORAL at 08:17

## 2023-07-01 RX ADMIN — Medication 6.25 MG: at 11:15

## 2023-07-01 RX ADMIN — ASPIRIN 81 MG CHEWABLE TABLET 162 MG: 81 TABLET CHEWABLE at 08:15

## 2023-07-01 RX ADMIN — Medication 6.25 MG: at 19:53

## 2023-07-01 RX ADMIN — GABAPENTIN 100 MG: 100 CAPSULE ORAL at 15:26

## 2023-07-01 RX ADMIN — HEPARIN SODIUM 5000 UNITS: 5000 INJECTION, SOLUTION INTRAVENOUS; SUBCUTANEOUS at 13:43

## 2023-07-01 RX ADMIN — SENNOSIDES AND DOCUSATE SODIUM 1 TABLET: 50; 8.6 TABLET ORAL at 19:52

## 2023-07-01 RX ADMIN — MAGNESIUM SULFATE IN WATER 2 G: 40 INJECTION, SOLUTION INTRAVENOUS at 03:14

## 2023-07-01 RX ADMIN — ACETAMINOPHEN 975 MG: 325 TABLET, FILM COATED ORAL at 05:47

## 2023-07-01 RX ADMIN — METHOCARBAMOL 750 MG: 750 TABLET ORAL at 20:48

## 2023-07-01 RX ADMIN — ACETAMINOPHEN 975 MG: 325 TABLET, FILM COATED ORAL at 21:57

## 2023-07-01 ASSESSMENT — ACTIVITIES OF DAILY LIVING (ADL)
WALKING_OR_CLIMBING_STAIRS_DIFFICULTY: NO
ADLS_ACUITY_SCORE: 45
ADLS_ACUITY_SCORE: 49
VISION_MANAGEMENT: GLASSES
ADLS_ACUITY_SCORE: 30
TOILETING_ISSUES: NO
CHANGE_IN_FUNCTIONAL_STATUS_SINCE_ONSET_OF_CURRENT_ILLNESS/INJURY: NO
FALL_HISTORY_WITHIN_LAST_SIX_MONTHS: NO
ADLS_ACUITY_SCORE: 45
WEAR_GLASSES_OR_BLIND: YES
ADLS_ACUITY_SCORE: 45
DOING_ERRANDS_INDEPENDENTLY_DIFFICULTY: NO
ADLS_ACUITY_SCORE: 49
DRESSING/BATHING_DIFFICULTY: NO
DIFFICULTY_EATING/SWALLOWING: NO
CONCENTRATING,_REMEMBERING_OR_MAKING_DECISIONS_DIFFICULTY: NO
ADLS_ACUITY_SCORE: 30

## 2023-07-01 NOTE — PROGRESS NOTES
07/01/23 1200   Living Environment   People in Home spouse   Current Living Arrangements house   Home Accessibility stairs to enter home;stairs within home   Number of Stairs, Main Entrance 3   Stair Railings, Main Entrance railings safe and in good condition  (one side)   Number of Stairs, Within Home, Primary   (4 levels total, bedroom on upper level)   Stair Railings, Within Home, Primary railings safe and in good condition  (one side)   Transportation Anticipated car, drives self;family or friend will provide   Self-Care   Usual Activity Tolerance good   Current Activity Tolerance moderate   Equipment Currently Used at Home none   Fall history within last six months no   Activity/Exercise/Self-Care Comment Pt reports himself and his wife are retired, ind at baseline   General Information   Onset of Illness/Injury or Date of Surgery 06/30/23   Referring Physician Maria Teresa Serna MD   Additional Occupational Profile Info/Pertinent History of Current Problem Jerald Lawrence is a 62 year old male with PMH of hx of tobacco abuse. HFrEF (35%), HTN, HLD, CAD who underwent CABG x 3 on 6/30/2023 by Dr. Ferrer. Doing well post operatively   Existing Precautions/Restrictions sternal;fall;cardiac   Left Upper Extremity (Weight-bearing Status) partial weight-bearing (PWB)   Right Upper Extremity (Weight-bearing Status) partial weight-bearing (PWB)   General Observations and Info 10# lifting restriction   Cognitive Status Examination   Cognitive Status Comments no acute changes noted   Visual Perception   Impact of Vision Impairment on Function (Vision) no acute changes noted   Range of Motion Comprehensive   Comment, General Range of Motion BUE WFL w/in precautions   Strength Comprehensive (MMT)   Comment, General Manual Muscle Testing (MMT) Assessment NT formally 2/2 precautions, overall deconditioned   Bed Mobility   Comment (Bed Mobility) CGA   Transfers   Transfer Comments CGA   Lower Body Dressing Assessment/Training    Comment, (Lower Body Dressing) per clinical judgement anticiapte min assist   Toileting   Comment, (Toileting) per clinical judgement anticiapte min assist   Clinical Impression   Criteria for Skilled Therapeutic Interventions Met (OT) Yes, treatment indicated   OT Diagnosis decreased ind in I/ADLS   Influenced by the following impairments generalized weakness and precautions   OT Problem List-Impairments impacting ADL problems related to;activity tolerance impaired;balance;mobility;strength;pain;post-surgical precautions   ADL comments/analysis decreased ind in I/ADLS   Assessment of Occupational Performance 1-3 Performance Deficits   Planned Therapy Interventions (OT) ADL retraining;IADL retraining;balance training;bed mobility training;strengthening;transfer training;home program guidelines;progressive activity/exercise;risk factor education   Clinical Decision Making Complexity (OT) moderate complexity   Risk & Benefits of therapy have been explained evaluation/treatment results reviewed;care plan/treatment goals reviewed;patient   OT Total Evaluation Time   OT Eval, Moderate Complexity Minutes ([id number]) 6

## 2023-07-01 NOTE — PLAN OF CARE
Major Shift Events:  Neurologically intact. SR with frequent PAC/PVCs. Restarted on PO Metoprolol 6.25mg BID.  Bilateral fine crackles in the bases. 4L NC. LBM: PTA. Chou removed around 1400. Diet advanced without issue. Transitioned to subcutaneous insulin. CT x3, 10-30ml/hr. Right, Radial Art line removed around 1400. Internal jugular/MAC removed around 1630.     Patient transferred to  around 1715.    Plan: Continue to monitor for symptomatic dysrhythmias, and address appropriately with the team.    For vital signs and complete assessments, please see documentation flowsheets.

## 2023-07-01 NOTE — PROGRESS NOTES
CV ICU PROGRESS NOTE  July 1, 2023   Jerald Lawrence  [id number]  Admitted: 6/30/2023  4:36 AM      ASSESSMENT  Jerald Lawrence is a 62 year old male with PMH of hx of tobacco abuse. HFrEF (35%), HTN, HLD, CAD who underwent CABG x 3 on 6/30/2023 by Dr. Ferrer. Doing well post operatively     COMORBIDITIES  -  HFrEF (35%), HTN, HLD, CAD     Plan for day   - Metoprolol 6.25 BID  - Discontinue A line  - Discontinue insulin drip and start sliding scale   - Transfer to floor today       PLAN  Neuro/pain/sedation  #Acute postoperative pain  - Scheduled: acetaminophen  - PRN: oxycodone, hydromorphone, robaxin      Pulmonary care  - Extubated on 2 L NC  - Goal SpO2 > 92%      Cardiovascular  #Hiostory of HTN  # History of HLD  # history of CAD  #S/P CABG   - Off pressors     Start PTA Metoprolol 6.25 bid      GI care/Nutrition  - Advance diet as tolerated   - Nutrition consulted, appreciate recommendations  - PPI  - Bowel regimen: MiraLAX, senna      Renal/Fluids/Electrolytes  - ICU electrolyte replacement protocol  - Strict I&Os      Endocrine  #Stress hyperglycemia  - BS <180      ID/Antibiotics  #Postoperative prophylactic antibiotics  - No indication for antibiotics      Heme  #Acute blood loss anemia  - Hgb goal > 7.0      Prophylaxis  - VTE: SCD's, heparin 5000u sq  - Bowel regimen: PPI, MiraLAX, senna      LDA  -RIJ CVC, arterial line, Chou      Disposition: CV ICU    Patient seen, findings and plan discussed with CV ICU staff Dr. Dilma Sampson MD  Surgery PGY3  Pager [phone]      ====================================    TODAY'S PROGRESS  SUBJECTIVE  - Pt doing well this morning, he is sitting up in chair, pain is well controlled, has a good appetite        OBJECTIVE  1. VITAL SIGNS  Temp:  [98.1  F (36.7  C)-101.8  F (38.8  C)] 100.6  F (38.1  C)  Pulse:  [] 86  Resp:  [12-29] 21  MAP:  [62 mmHg-84 mmHg] 78 mmHg  Arterial Line BP: ()/(43-66) 107/66  FiO2 (%):  [40 %-50 %] 40 %  SpO2:  [93 %-100 %]  95 %  Vent Mode: CPAP/PS  (Continuous positive airway pressure with Pressure Support)  FiO2 (%): 40 %  Resp Rate (Set): 16 breaths/min  Tidal Volume (Set, mL): 500 mL  PEEP (cm H2O): 5 cmH2O  Pressure Support (cm H2O): 5 cmH2O  Resp: 21      2. INTAKE/ OUTPUT  I/O last 3 completed shifts:  In: 5525.82 [P.O.:240; I.V.:3460.82; Other:175; NG/GT:150; IV Piggyback:1500]  Out: 2935 [Urine:1325; Blood:1000; Chest Tube:610]    3. PHYSICAL EXAMINATION  General: Resting in bed, no acute distress  HEENT: Atraumatic, normocephalic  Cardiovascular: No tachycardia, normotensive, pulses equal bilaterally, Sternotomy incision is clean, dry, intact, chest tubes in place and sternal wires in place   Pulmonary: No acute respiratory distress satting well on room air  GI: Abdomen soft, nontender, nondistended  Extremities: Moving all extremities without difficulty  Neuro: No focal neurological deficits      4. INVESTIGATIONS  Arterial Blood Gases   Recent Labs   Lab 07/01/23  0357 07/01/23  0027 06/30/23  2252 06/30/23  1950   PH 7.49* 7.40 7.37 7.30*   PCO2 37 37 41 35   PO2 71* 113* 124* 133*   HCO3 28 23 24 17*     Complete Blood Count   Recent Labs   Lab 07/01/23  0359 06/30/23  1412 06/30/23  1253 06/30/23  1230 06/30/23  0804 06/30/23  0545   WBC 15.9* 24.0*  --  19.5*  --  7.6   HGB 11.4* 12.1* 11.3* 12.0*   < > 14.9    227  --  228  --  258    < > = values in this interval not displayed.     Basic Metabolic Panel  Recent Labs   Lab 07/01/23  0654 07/01/23  0551 07/01/23  0405 07/01/23  0359 06/30/23  2344 06/30/23  2315 06/30/23  1515 06/30/23  1412 06/30/23  1400 06/30/23  1253 06/30/23  0804 06/30/23  0545   NA  --   --   --  138  --  143  --  141  --  141   < > 140   POTASSIUM  --   --   --  4.1  --  4.1  --  4.0  --  3.4*   < > 5.2   CHLORIDE  --   --   --  104  --  109*  --  104  --   --   --  104   CO2  --   --   --  25  --  23  --  20*  --   --   --  25   BUN  --   --   --  17.9  --  16.3  --  14.3  --   --   --   15.9   CR  --   --   --  0.87  --  0.87  --  0.81  --   --   --  0.84   * 99 138* 135*   < > 140*   < > 171*   < > 134*   < > 88  88    < > = values in this interval not displayed.     Liver Function Tests  Recent Labs   Lab 07/01/23  0359 06/30/23  1412 06/30/23  1230 06/30/23  0545   AST 44 42  --   --    ALT 23 27  --   --    ALKPHOS 63 72  --   --    BILITOTAL 0.3 0.4  --   --    ALBUMIN 3.3* 3.5  --   --    INR  --  1.31* 1.39* 1.05     Pancreatic Enzymes  No lab results found in last 7 days.  Coagulation Profile  Recent Labs   Lab 06/30/23  1412 06/30/23  1230 06/30/23  0545   INR 1.31* 1.39* 1.05   PTT 43* 29  --      Lactate  Invalid input(s): LACTATE    5. RADIOLOGY  Recent Results (from the past 24 hour(s))   RANULFO with Report    Narrative    Dav Patton MD     6/30/2023 12:46 PM  Perioperative RANULFO Procedure Note    Staff -        Anesthesiologist:  Brandin Sam MD       Resident/Fellow: Dav Patton MD       Performed By: fellow  Preanesthesia Checklist:  Patient identified, IV assessed, risks and   benefits discussed, monitors and equipment assessed, procedure being   performed at surgeon's request and anesthesia consent obtained.    RANULFO Probe Insertion    Probe Status PRE Insertion: NO obvious damage  Probe type:  Adult 3D  Bite block used:   Soft  Insertion Technique: Laryngoscopy, Recurrent attempts  Insertion complications: Lip Injury  Billing Report:RANULFO report by Anesthesiologist (See Separate Report note)  Probe Status POST Removal: NO obvious damage  Comments: First attempt with jaw lift with resistance. Second attempt with   DL no issues. Small amount of bleeding/abrasion to upper lip either from   intubation or RANULFO placement.     RANULFO Report  General Procedure Information  Images for this study have been archived.  Modalities: 2D, CW Doppler, Color flow mapping, PW Doppler and 3D  Diagnostic indications for RANULFO:   Cardiomyopathy, other  Diagnostic Indications  comments: CABG.  Echocardiographic and Doppler Measurements  Right Ventricle:  Cavity size normal.    Hypertrophy not present.     Thrombus not present.    Global function normal.     Left Ventricle:  Cavity size normal.    Hypertrophy not present.     Thrombus not present.   Global Function normal.   Ejection Fraction 50%.        Valves  Aortic Valve: Annulus normal.  Stenosis not present.  Regurgitation   absent.  Leaflets normal.  Leaflet motions normal.    Mitral Valve: Annulus normal.  Stenosis not present.  Regurgitation   absent.  Leaflets normal.  Leaflet motions normal.    Tricuspid Valve: Annulus normal.  Stenosis not present.  Regurgitation   absent.  Leaflets normal.  Leaflet motions normal.    Pulmonic Valve: Annulus normal.  Stenosis not present.  Regurgitation   absent.      Aorta: Ascending Aorta: Size normal.  Dissection not present.  Mobile   plaque not present.    Aortic Arch: Size normal.    Dissection not present.   Plaque thickness   greater than 3 mm.   Mobile plaque not present.    Descending Aorta: Size normal.    Dissection not present.    Mobile plaque   not present.      Right Atrium:  Size normal.   Spontaneous echo contrast not present.     Thrombus not present.   Tumor not present.   Device not present.     Left Atrium: Size normal.  Spontaneous echo contrast not present.    Thrombus not present.  Tumor not present.  Device not present.    Left atrial appendage normal.     Atrial Septum: Intra-atrial septal morphology normal.       Ventricular Septum: Intra-ventricular septum morphology normal.        Other Findings:   Pericardium:  normal. Pleural Effusion:  none. Pulmonary Arteries:    normal.  Cornoary sinus catheter present.    Post Intervention Findings  Procedure(s) performed:  CABG.  Regional wall motion:. Surgeon(s) notified   of all postintervention findings: Yes.             Post Intervention comments: Post - CABG  Normal biventricular function. No RWMA. EF now 50-55% (on low  dose   inotropes). There is no new valvular pathology. No aortic dissection. .    Echocardiogram Comments  Echocardiogram comments: Pre - LVEF 45-50%. Normal LV diastolic function.  AV: tri-leaflet. No stenosis. No regurgitation.   MV: normal   TV: normal  PV: normal  Aorta: Small plaque located on the aortic arch. Ascending aorta is normal.   No aortic dissection.  No PFO by CFD  No clot in GILBERT.   All finding communicated to the surgery team..   XR Chest Port 1 View   Result Value    Radiologist flags Milton Samantha catheter looping (Urgent)    Narrative    Chest one view portable    HISTORY: Postop CVT S surgery    COMPARISON STUDY: 4/20/2020    FINDINGS: Endotracheal tube in midtrachea. Median sternotomy wires.  Right IJ Milton-Samantha catheter tip appearing pulmonary artery. Surgical  drains and left chest tube in place. Interstitial opacities  bilaterally. Lung volumes are low. Left basilar atelectasis      Impression    IMPRESSION:  1. Milton-Samantha catheter looping in the main pulmonary artery. Recommend  repositioning.  2. Pulmonary venous congestion. Left basilar atelectasis.    [Access Center: Milton Samantha catheter looping]    This report will be copied to the [facility] to ensure a  provider acknowledges the finding. Access Center is available Monday  through Friday 8am-3:30 pm.     YOBANY SHER MD         SYSTEM ID:  [id number]   XR Chest Port 1 View    Narrative    EXAM: XR CHEST PORT 1 VIEW  6/30/2023 4:36 PM      HISTORY: pulled PA cath back 2 cm    COMPARISON: Same day radiograph    FINDINGS: Single view of the chest. The endotracheal tube tip projects  in the mid thoracic trachea. Median sternotomy wires. Right IJ  approach Milton-Samantha catheter with tip looping in the main pulmonary  artery. Mediastinal drains x2. Left basilar chest tube.    Trachea is midline. Stable cardiac mediastinal silhouette. Unchanged  left basilar atelectasis. No substantial pleural effusion. No  discernible pneumothorax.       Impression    IMPRESSION: Greenwood-Samantha catheter tip remains looped in the pulmonary  artery.    I have personally reviewed the examination and initial interpretation  and I agree with the findings.    JORDYN MCCAULEY MD         SYSTEM ID:  [id number]   XR Chest Port 1 View    Narrative    EXAM: XR CHEST PORT 1 VIEW  6/30/2023 5:54 PM      HISTORY: evaluate PAC position s/p retraction    COMPARISON: Same day radiograph    FINDINGS: Single view of the chest. The endotracheal tube tip projects  in the mid thoracic trachea. Median sternotomy wires. Right IJ  approach Greenwood-Samantha catheter with tip looping in the main pulmonary  artery. Mediastinal drains x2. Left basilar chest tube.    Trachea is midline. Stable cardiac mediastinal silhouette. Unchanged  left basilar atelectasis. No substantial pleural effusion. No  discernible pneumothorax.      Impression    IMPRESSION: Greenwood-Samantha catheter tip remains looped in the main  pulmonary artery.    I have personally reviewed the examination and initial interpretation  and I agree with the findings.    JORDYN MCCAULEY MD         SYSTEM ID:  [id number]   XR Chest Port 1 View    Narrative    EXAM: XR CHEST PORT 1 VIEW  6/30/2023 5:55 PM      HISTORY: s/p retraction    COMPARISON: Same day radiograph    FINDINGS: Single view of the chest. The endotracheal tube tip projects  in the midthoracic trachea. Median sternotomy wires. Right IJ approach  Greenwood-Samantha catheter with tip projecting over the proximal main  pulmonary artery. Mediastinal drains x2. Left basilar chest tube.    Trachea is midline. Stable cardiac mediastinal silhouette. Unchanged  left basilar atelectasis. No substantial pleural effusion. No  discernible pneumothorax.      Impression    IMPRESSION: Greenwood-Samantha catheter tip projects over the proximal main  pulmonary artery.    I have personally reviewed the examination and initial interpretation  and I agree with the findings.    JORDYN MCCAULEY MD         SYSTEM ID:  [id number]    XR Abdomen Port 1 View    Narrative    EXAM: XR ABDOMEN PORT 1 VIEW  6/30/2023 7:39 PM     HISTORY:  Check NG/OG tube placement       COMPARISON:  X-ray 6/30/2023    FINDINGS:   AP supine portable radiograph of the abdomen. Enteric tube tip and  sidehole project over the stomach. Stable positioning of the left  basilar chest tube and partially visualized mediastinal drains.  Epigastric surgical clips. Moderate/large chronic stool burden.  Nonobstructive bowel gas pattern. No visualized pneumatosis, portal  venous gas, or free air. No acute osseous abnormality.      Impression    IMPRESSION:  1. Enteric tube tip and sidehole project over the stomach.  2. Nonobstructive bowel gas pattern with moderate/large colonic stool  burden.    I have personally reviewed the examination and initial interpretation  and I agree with the findings.    JORDYN MCCAULEY MD         SYSTEM ID:  [id number]   XR Chest Port 1 View    Narrative    EXAM: XR CHEST PORT 1 VIEW  7/1/2023 1:50 AM     HISTORY:  Post Op CVTS Surgery       COMPARISON:  6/30/2023    FINDINGS:   AP portable upright view the chest. Right IJ sheath in the high SVC.  Similar position of mediastinal drains. Postsurgical changes of the  chest with intact median sternotomy wires and surgical clips.  Partially visualized left basilar chest tube. Trachea is midline.  Cardiomediastinal silhouette and pulmonary vasculature are within  normal limits. Stable bibasilar streaky bony opacities. No significant  pleural effusion. No appreciable pneumothorax.    No acute osseous abnormality. Visualized upper abdomen is  unremarkable.        Impression    IMPRESSION:   1. Support devices in stable position, as above.  2. Stable bibasilar pulmonary opacities, favoring atelectasis/edema.    I have personally reviewed the examination and initial interpretation  and I agree with the findings.    CHARANJIT ROJAS MD         SYSTEM ID:  [id number]

## 2023-07-01 NOTE — PLAN OF CARE
Major Shift Events:  A+Ox4, obeys commands. Weaned off of all sedation. TMAX of 101.5F.  SR with frequent ectopy. Weaned off of Epi due to frequent PVCs, intermittently on minimal NE. Successful pressure support trial and extubated to 4L oxymask by RT. Passed bedside swallow eval and advanced to clear liquid diet. Adequate UOP. 20-50 ml/hr of chest tube output.     Plan: Continue to wean support as able. Begin ambulating and working with therapies.    For vital signs and complete assessments, please see documentation flowsheets.

## 2023-07-01 NOTE — PROGRESS NOTES
[facility], Procedure Note          Extubation:       Jerald Lawrence  MRN# [id number]   July 1, 2023, 12:50 AM         Patient extubated at: July 1, 2023, 12:50 AM   Supplemental Oxygen: Via face mask at 4 liters per minute   Cough: The cough is good and productive   Secretion Mode: PRN suction with assistance   Secretion Amount: Moderate amount, moderately thick and baldemar and tan in color   Respiratory Exam:: Breath sounds: good aeration     Location: all lobes and bilaterally   Skin Exam:: Patient color: natural   Patient Status: Currently appears comfortable, arouses to verbal and arouses to touch   Arterial Blood Gasses: pH Arterial (no units)   Date Value   07/01/2023 7.40     pO2 Arterial (mm Hg)   Date Value   07/01/2023 113 (H)     pCO2 Arterial (mm Hg)   Date Value   07/01/2023 37     Bicarbonate Arterial (mmol/L)   Date Value   07/01/2023 23            Recorded by Andres Farrar RT

## 2023-07-02 ENCOUNTER — APPOINTMENT (OUTPATIENT)
Dept: OCCUPATIONAL THERAPY | Facility: CLINIC | Age: 63
DRG: 235 | End: 2023-07-02
Attending: SURGERY
Payer: COMMERCIAL

## 2023-07-02 ENCOUNTER — APPOINTMENT (OUTPATIENT)
Dept: GENERAL RADIOLOGY | Facility: CLINIC | Age: 63
DRG: 235 | End: 2023-07-02
Attending: PHYSICIAN ASSISTANT
Payer: COMMERCIAL

## 2023-07-02 LAB
ANION GAP SERPL CALCULATED.3IONS-SCNC: 8 MMOL/L (ref 7–15)
BUN SERPL-MCNC: 19.1 MG/DL (ref 8–23)
CA-I BLD-MCNC: 4.7 MG/DL (ref 4.4–5.2)
CALCIUM SERPL-MCNC: 9 MG/DL (ref 8.8–10.2)
CHLORIDE SERPL-SCNC: 102 MMOL/L (ref 98–107)
CREAT SERPL-MCNC: 0.89 MG/DL (ref 0.67–1.17)
DEPRECATED HCO3 PLAS-SCNC: 28 MMOL/L (ref 22–29)
ERYTHROCYTE [DISTWIDTH] IN BLOOD BY AUTOMATED COUNT: 12.5 % (ref 10–15)
GFR SERPL CREATININE-BSD FRML MDRD: >90 ML/MIN/1.73M2
GLUCOSE BLDC GLUCOMTR-MCNC: 110 MG/DL (ref 70–99)
GLUCOSE BLDC GLUCOMTR-MCNC: 110 MG/DL (ref 70–99)
GLUCOSE BLDC GLUCOMTR-MCNC: 114 MG/DL (ref 70–99)
GLUCOSE BLDC GLUCOMTR-MCNC: 139 MG/DL (ref 70–99)
GLUCOSE SERPL-MCNC: 114 MG/DL (ref 70–99)
HCT VFR BLD AUTO: 35.7 % (ref 40–53)
HGB BLD-MCNC: 11.1 G/DL (ref 13.3–17.7)
HGB BLD-MCNC: 11.7 G/DL (ref 13.3–17.7)
MCH RBC QN AUTO: 29 PG (ref 26.5–33)
MCHC RBC AUTO-ENTMCNC: 31.1 G/DL (ref 31.5–36.5)
MCV RBC AUTO: 93 FL (ref 78–100)
PLATELET # BLD AUTO: 180 10E3/UL (ref 150–450)
POTASSIUM SERPL-SCNC: 4.6 MMOL/L (ref 3.4–5.3)
RBC # BLD AUTO: 3.83 10E6/UL (ref 4.4–5.9)
SODIUM SERPL-SCNC: 138 MMOL/L (ref 136–145)
WBC # BLD AUTO: 15.5 10E3/UL (ref 4–11)

## 2023-07-02 PROCEDURE — 71045 X-RAY EXAM CHEST 1 VIEW: CPT | Mod: 26 | Performed by: RADIOLOGY

## 2023-07-02 PROCEDURE — 93010 ELECTROCARDIOGRAM REPORT: CPT | Performed by: INTERNAL MEDICINE

## 2023-07-02 PROCEDURE — 82330 ASSAY OF CALCIUM: CPT | Performed by: STUDENT IN AN ORGANIZED HEALTH CARE EDUCATION/TRAINING PROGRAM

## 2023-07-02 PROCEDURE — 93005 ELECTROCARDIOGRAM TRACING: CPT

## 2023-07-02 PROCEDURE — 250N000011 HC RX IP 250 OP 636: Mod: JZ | Performed by: STUDENT IN AN ORGANIZED HEALTH CARE EDUCATION/TRAINING PROGRAM

## 2023-07-02 PROCEDURE — 36415 COLL VENOUS BLD VENIPUNCTURE: CPT | Performed by: PHYSICIAN ASSISTANT

## 2023-07-02 PROCEDURE — 71046 X-RAY EXAM CHEST 2 VIEWS: CPT | Mod: 26 | Performed by: RADIOLOGY

## 2023-07-02 PROCEDURE — 97110 THERAPEUTIC EXERCISES: CPT | Mod: GO | Performed by: OCCUPATIONAL THERAPIST

## 2023-07-02 PROCEDURE — 71046 X-RAY EXAM CHEST 2 VIEWS: CPT

## 2023-07-02 PROCEDURE — 250N000013 HC RX MED GY IP 250 OP 250 PS 637: Performed by: STUDENT IN AN ORGANIZED HEALTH CARE EDUCATION/TRAINING PROGRAM

## 2023-07-02 PROCEDURE — 214N000001 HC R&B CCU UMMC

## 2023-07-02 PROCEDURE — 85014 HEMATOCRIT: CPT

## 2023-07-02 PROCEDURE — 250N000013 HC RX MED GY IP 250 OP 250 PS 637: Performed by: PHYSICIAN ASSISTANT

## 2023-07-02 PROCEDURE — 97530 THERAPEUTIC ACTIVITIES: CPT | Mod: GO | Performed by: OCCUPATIONAL THERAPIST

## 2023-07-02 PROCEDURE — 97535 SELF CARE MNGMENT TRAINING: CPT | Mod: GO | Performed by: OCCUPATIONAL THERAPIST

## 2023-07-02 PROCEDURE — 999N000065 XR CHEST PORT 1 VIEW

## 2023-07-02 PROCEDURE — 82310 ASSAY OF CALCIUM: CPT | Performed by: PHYSICIAN ASSISTANT

## 2023-07-02 PROCEDURE — 85018 HEMOGLOBIN: CPT | Performed by: PHYSICIAN ASSISTANT

## 2023-07-02 RX ORDER — FUROSEMIDE 20 MG
20 TABLET ORAL
Status: DISCONTINUED | OUTPATIENT
Start: 2023-07-02 | End: 2023-07-02

## 2023-07-02 RX ORDER — LACTULOSE 10 G/10G
20 SOLUTION ORAL ONCE
Status: COMPLETED | OUTPATIENT
Start: 2023-07-02 | End: 2023-07-02

## 2023-07-02 RX ORDER — FUROSEMIDE 20 MG
20 TABLET ORAL ONCE
Status: COMPLETED | OUTPATIENT
Start: 2023-07-02 | End: 2023-07-02

## 2023-07-02 RX ORDER — PANTOPRAZOLE SODIUM 40 MG/1
40 TABLET, DELAYED RELEASE ORAL
Status: DISCONTINUED | OUTPATIENT
Start: 2023-07-02 | End: 2023-07-04 | Stop reason: HOSPADM

## 2023-07-02 RX ORDER — ROSUVASTATIN CALCIUM 20 MG/1
20 TABLET, COATED ORAL DAILY
Status: DISCONTINUED | OUTPATIENT
Start: 2023-07-02 | End: 2023-07-04 | Stop reason: HOSPADM

## 2023-07-02 RX ORDER — POTASSIUM CHLORIDE 750 MG/1
20 TABLET, EXTENDED RELEASE ORAL ONCE
Status: COMPLETED | OUTPATIENT
Start: 2023-07-02 | End: 2023-07-02

## 2023-07-02 RX ORDER — GABAPENTIN 100 MG/1
100 CAPSULE ORAL 3 TIMES DAILY
Status: DISCONTINUED | OUTPATIENT
Start: 2023-07-02 | End: 2023-07-02

## 2023-07-02 RX ORDER — POTASSIUM CHLORIDE 750 MG/1
20 TABLET, EXTENDED RELEASE ORAL ONCE
Status: DISCONTINUED | OUTPATIENT
Start: 2023-07-02 | End: 2023-07-02

## 2023-07-02 RX ORDER — ASPIRIN 81 MG/1
162 TABLET ORAL DAILY
Status: DISCONTINUED | OUTPATIENT
Start: 2023-07-02 | End: 2023-07-04 | Stop reason: HOSPADM

## 2023-07-02 RX ORDER — SIMETHICONE 80 MG
80 TABLET,CHEWABLE ORAL 4 TIMES DAILY
Status: COMPLETED | OUTPATIENT
Start: 2023-07-02 | End: 2023-07-04

## 2023-07-02 RX ADMIN — HEPARIN SODIUM 5000 UNITS: 5000 INJECTION, SOLUTION INTRAVENOUS; SUBCUTANEOUS at 19:17

## 2023-07-02 RX ADMIN — METOPROLOL TARTRATE 12.5 MG: 25 TABLET, FILM COATED ORAL at 10:19

## 2023-07-02 RX ADMIN — ASPIRIN 162 MG: 81 TABLET, COATED ORAL at 10:18

## 2023-07-02 RX ADMIN — LACTULOSE 20 G: 10 POWDER, FOR SOLUTION ORAL at 13:15

## 2023-07-02 RX ADMIN — METOPROLOL TARTRATE 12.5 MG: 25 TABLET, FILM COATED ORAL at 19:18

## 2023-07-02 RX ADMIN — FUROSEMIDE 20 MG: 20 TABLET ORAL at 10:19

## 2023-07-02 RX ADMIN — ONDANSETRON 4 MG: 4 TABLET, ORALLY DISINTEGRATING ORAL at 09:03

## 2023-07-02 RX ADMIN — METHOCARBAMOL 750 MG: 750 TABLET ORAL at 13:16

## 2023-07-02 RX ADMIN — METHOCARBAMOL 750 MG: 750 TABLET ORAL at 03:24

## 2023-07-02 RX ADMIN — POLYETHYLENE GLYCOL 3350 17 G: 17 POWDER, FOR SOLUTION ORAL at 10:25

## 2023-07-02 RX ADMIN — HYDROMORPHONE HYDROCHLORIDE 0.4 MG: 0.2 INJECTION, SOLUTION INTRAMUSCULAR; INTRAVENOUS; SUBCUTANEOUS at 01:08

## 2023-07-02 RX ADMIN — GABAPENTIN 100 MG: 100 CAPSULE ORAL at 10:19

## 2023-07-02 RX ADMIN — POTASSIUM CHLORIDE 20 MEQ: 750 TABLET, EXTENDED RELEASE ORAL at 10:19

## 2023-07-02 RX ADMIN — OXYCODONE HYDROCHLORIDE 10 MG: 10 TABLET ORAL at 03:24

## 2023-07-02 RX ADMIN — SENNOSIDES AND DOCUSATE SODIUM 1 TABLET: 50; 8.6 TABLET ORAL at 10:25

## 2023-07-02 RX ADMIN — ACETAMINOPHEN 975 MG: 325 TABLET, FILM COATED ORAL at 17:29

## 2023-07-02 RX ADMIN — PANTOPRAZOLE SODIUM 40 MG: 40 TABLET, DELAYED RELEASE ORAL at 10:19

## 2023-07-02 RX ADMIN — SIMETHICONE 80 MG: 80 TABLET, CHEWABLE ORAL at 13:16

## 2023-07-02 RX ADMIN — SIMETHICONE 80 MG: 80 TABLET, CHEWABLE ORAL at 17:29

## 2023-07-02 RX ADMIN — HEPARIN SODIUM 5000 UNITS: 5000 INJECTION, SOLUTION INTRAVENOUS; SUBCUTANEOUS at 03:25

## 2023-07-02 RX ADMIN — ROSUVASTATIN CALCIUM 20 MG: 20 TABLET, FILM COATED ORAL at 10:19

## 2023-07-02 RX ADMIN — ACETAMINOPHEN 975 MG: 325 TABLET, FILM COATED ORAL at 09:26

## 2023-07-02 RX ADMIN — LIDOCAINE PATCH 4% 1 PATCH: 40 PATCH TOPICAL at 19:19

## 2023-07-02 ASSESSMENT — ACTIVITIES OF DAILY LIVING (ADL)
ADLS_ACUITY_SCORE: 30
ADLS_ACUITY_SCORE: 30
ADLS_ACUITY_SCORE: 28
ADLS_ACUITY_SCORE: 30
ADLS_ACUITY_SCORE: 30
ADLS_ACUITY_SCORE: 27
ADLS_ACUITY_SCORE: 30
ADLS_ACUITY_SCORE: 30
ADLS_ACUITY_SCORE: 28
ADLS_ACUITY_SCORE: 30
ADLS_ACUITY_SCORE: 28
ADLS_ACUITY_SCORE: 28

## 2023-07-02 NOTE — PROGRESS NOTES
Cardiovascular Surgery Progress Note  07/02/2023         Assessment and Plan:     Jerald Lawrence is a 62-year-old gentleman who underwent a coronary angiogram recently that showed significant 2-vessel disease.  He had been having intermittent chest pain with exertion over the last several months.  He complained of chest pain that was increasing in intensity and frequency, either chest discomfort or heaviness while walking, and he denied any symptoms at rest. Pre-op EF 43%, mild to moderate anterior wall hypokinesis, mild to moderate lateral wall hypokinesis.  Coronary angiogram showed completely occluded coronary artery disease and fills with collaterals, 70% posterior descending artery, 99% first diagonal artery.  Patient is now s/p scheduled 3 vessel CAB with Dr. Sushant Ferrer on 6/30/23.    Cardiovascular:   Hx of CAD   S/p 3 vessel CAB  No arrhythmia, NSR with PVCs. HD stable.  Intra-op echo showed LV EF 50% with normal global LV/RF function.   mg, rosuvastatin 20 mg   Metoprolol 12.5 mg BID since 7/2.     Chest tubes: 2 mediastinal removed 7/2, Left pleural remains but likely clotted off  TPW: capped 7/1 in ICU, removed 7/2.    Pulmonary:  Left Pleural Effusion  - Extubated POD 0 to 4 lpm via NC. Now saturating well on 2 lpm.   - Supplemental O2 PRN to keep sats > 92%. Wean off as tolerated.  - Pulm toilet, IS, activity and deep breathing  - Suspect Left pleural tube has clotted off, stripped tube 7/2 but did not get any extra drainage.    Neurology / MSK:  - Neuro intact  - Acute post-operative pain well controlled with acetaminophen, PO oxycodone PRN, IV dilaudid PRN     / Renal:  - No Hx of renal disease. Most recent creatinine WNL, adequate UOP. Chou out 7/1 and voiding well.   - Pre-op weight 197 lbs.   - Diuresis; lasix 20 mg PO once on 7/2. Starting slow, had received fluid boluses in ICU but needed more O2 on 7/2 AM.    GI / FEN:   Post-op Nausea and Abd distension  - Regular diet with  "supplements, continue bowel regimen until + BM  - Replace electrolytes as needed, hepatic enzymes WNL.  - Abd Distension: simethicone x 8 doses. AXR in AM on 7/3.    Endocrine:  Pre-op Hgb A1C 5.4%  Stress induced hyperglycemia initially managed on insulin drip postop, transitioned to sliding scale.    Infectious Disease:  Stress induced leukocytosis  - WBC 15.5, remains afebrile, no signs or symptoms of infection  - Completed perioperative antibiotics    Hematology:   Acute blood loss anemia and thrombocytopenia  Hgb 11.1; Plt 180, no signs or symptoms of active bleeding    Anticoagulation:   - ASA only    Prophylaxis:   - Stress ulcer prophylaxis: Pantoprazole 40 mg daily for 30 days  - DVT prophylaxis: Subcutaneous heparin, SCD    Disposition:   - Transferred to  on 7/1  - Therapies recommending discharge to home with assist, TBD    Discussed with Dr Sushant Ferrer through verbal communication.      Jeremy Selby PA-C  Cardiothoracic Surgery  Pager [phone]    7:13 AM   July 2, 2023        Interval History:     No overnight events.   States pain is well managed on current regimen. Slept well overnight.  Tolerating diet, is passing minimal flatus, no BM. No nausea or vomiting.  Breathing well without complaints this AM.   Working with therapies and ambulating in halls with assistance.   Denies chest pain, palpitations, dizziness, syncopal symptoms, fevers, chills, myalgias, or sternal popping/clicking.         Physical Exam:   Blood pressure 114/82, pulse 88, temperature 98  F (36.7  C), temperature source Oral, resp. rate 17, height 1.778 m (5' 10\"), weight 90.3 kg (199 lb 1.2 oz), SpO2 94 %.  Vitals:    06/30/23 0521 07/01/23 0400   Weight: 89.8 kg (197 lb 15.6 oz) 90.3 kg (199 lb 1.2 oz)      Weight; + 2 lbs since admit and trending up.   24 hr Fluid status; net gain 615 mL. UOP 1 L  MAPs: 86 - 98    Gen: A&Ox4, NAD  Neuro: no focal deficits   CV: RRR, normal S1 S2, no murmurs, rubs or gallops.      * " removed TPW without immediate complication  Pulm: CTA, no wheezing or rhonchi, normal breathing on RA  Abd: nondistended, normal BS, soft, nontender  Ext: trace peripheral edema, non-pitting  Incision: clean, dry, intact, no erythema, sternum stable  Tubes/drain sites: dressing clean and dry, serosanguinous output, no air leak. 24 hr output 570 mL.      * removed mediastinal tubes without immediate complication, both tubes full of clot         Data:    Imaging:  reviewed recent imaging, no acute concerns but has left pleural effusion    Labs:  BMP  Recent Labs   Lab 07/01/23  2113 07/01/23  1802 07/01/23  1219 07/01/23  0809 07/01/23  0405 07/01/23  0359 06/30/23  2344 06/30/23  2315 06/30/23  1515 06/30/23  1412 06/30/23  1400 06/30/23  1253 06/30/23  0804 06/30/23  0545   NA  --   --   --   --   --  138  --  143  --  141  --  141   < > 140   POTASSIUM  --   --   --   --   --  4.1  --  4.1  --  4.0  --  3.4*   < > 5.2   CHLORIDE  --   --   --   --   --  104  --  109*  --  104  --   --   --  104   HORACIO  --   --   --   --   --  8.4*  --  7.9*  --  8.3*  --   --   --  9.6   CO2  --   --   --   --   --  25  --  23  --  20*  --   --   --  25   BUN  --   --   --   --   --  17.9  --  16.3  --  14.3  --   --   --  15.9   CR  --   --   --   --   --  0.87  --  0.87  --  0.81  --   --   --  0.84   * 106* 109* 111*   < > 135*   < > 140*   < > 171*   < > 134*   < > 88  88    < > = values in this interval not displayed.     CBC  Recent Labs   Lab 07/01/23  0359 06/30/23  1412 06/30/23  1253 06/30/23  1230 06/30/23  0804 06/30/23  0545   WBC 15.9* 24.0*  --  19.5*  --  7.6   RBC 3.86* 4.15*  --  3.99*  --  5.02   HGB 11.4* 12.1* 11.3* 12.0*   < > 14.9   HCT 33.6* 37.2*  --  35.9*  --  45.7   MCV 87 90  --  90  --  91   MCH 29.5 29.2  --  30.1  --  29.7   MCHC 33.9 32.5  --  33.4  --  32.6   RDW 12.2 12.1  --  12.0  --  12.0    227  --  228  --  258    < > = values in this interval not displayed.     INR  Recent Labs    Lab 06/30/23  1412 06/30/23  1230 06/30/23  0545   INR 1.31* 1.39* 1.05      Hepatic Panel  Recent Labs   Lab 07/01/23  0359 06/30/23  1412   AST 44 42   ALT 23 27   ALKPHOS 63 72   BILITOTAL 0.3 0.4   ALBUMIN 3.3* 3.5     GLUCOSE:   Recent Labs   Lab 07/01/23  2113 07/01/23  1802 07/01/23  1219 07/01/23  0809 07/01/23  0654 07/01/23  0551   * 106* 109* 111* 105* 99

## 2023-07-02 NOTE — PLAN OF CARE
CABGx3 6/30    Neuro: A&Ox4.   Cardiac: Afebrile, VSS. SR/ST with frequent PACs.   Respiratory: Weaned to RA. Using IS/acapella.   GI/: Voiding spontaneously. One unsaved void in toilet. No BM this shift. AM laxatives given. One dose of lactulose given this afternoon. Bowel sounds present. Abdomen distended. Simethicone started. AXR ordered for 7/3  Diet/appetite: Vomited this morning x 2. Tolerated enlive supplement and crackers but no appetite for lunch. Gave list of other supplements for nutrition support.  Activity: Up with SBA. Ambulated in mcrae with therapy and wife.  Pain: Reported pain 'in lungs when taking deep breaths'. Gave scheduled tylenol and PRN robaxin.   Skin: Sternal incision cleaned and STEFFANIE.  Lines: PIV x2.  Drains: 2 meds and TPW pulled this AM with subsequent bleeding. CVTS held pressure and redressed with no further bleeding. Hgb rechecked and stable. One pleural CT remains to suction.  Plan: AXR and CXR in AM. Encourage protein intake. Strict Is/Os.        Goal Outcome Evaluation:

## 2023-07-02 NOTE — PLAN OF CARE
Jerald Lawrence is a 62 year old male with PMH of hx of tobacco abuse. HFrEF (35%), HTN, HLD, CAD who underwent CABG x 3 on 6/30/2023 by Dr. Ferrer.   Shift 15:00-23:30  Accepted in transfer from  @17:15  Neuro: A&O x 4, pleasant.   Resp: Lungs diminished. IS and TCDB encouraged. Unsuccessful attempt made to wean off O2, now back on 2lnc with sats in the 90s. 3 CTs to one canister to suction, serosang output.   CV: SR 80s-90s with VSS. Temporary pacer set at 40.  GI/: Eating well, adequate urine output, hypoactive BS, not passing gas yet.  Mobility: Moves well in bed. States having been up in the chair for many hours in ICU so remained in bed this evening.  Pain: C/o some pain with breathing. Given oxycodone and robaxin with some relief.  Plan: Assist as needed. CVTS with issues.

## 2023-07-03 ENCOUNTER — APPOINTMENT (OUTPATIENT)
Dept: GENERAL RADIOLOGY | Facility: CLINIC | Age: 63
DRG: 235 | End: 2023-07-03
Attending: PHYSICIAN ASSISTANT
Payer: COMMERCIAL

## 2023-07-03 ENCOUNTER — APPOINTMENT (OUTPATIENT)
Dept: GENERAL RADIOLOGY | Facility: CLINIC | Age: 63
DRG: 235 | End: 2023-07-03
Payer: COMMERCIAL

## 2023-07-03 DIAGNOSIS — Z95.1 S/P CABG (CORONARY ARTERY BYPASS GRAFT): Primary | ICD-10-CM

## 2023-07-03 LAB
ANION GAP SERPL CALCULATED.3IONS-SCNC: 8 MMOL/L (ref 7–15)
BUN SERPL-MCNC: 20.7 MG/DL (ref 8–23)
CA-I BLD-MCNC: 4.7 MG/DL (ref 4.4–5.2)
CALCIUM SERPL-MCNC: 8.9 MG/DL (ref 8.8–10.2)
CHLORIDE SERPL-SCNC: 102 MMOL/L (ref 98–107)
CREAT SERPL-MCNC: 0.87 MG/DL (ref 0.67–1.17)
DEPRECATED HCO3 PLAS-SCNC: 28 MMOL/L (ref 22–29)
ERYTHROCYTE [DISTWIDTH] IN BLOOD BY AUTOMATED COUNT: 12.2 % (ref 10–15)
GFR SERPL CREATININE-BSD FRML MDRD: >90 ML/MIN/1.73M2
GLUCOSE SERPL-MCNC: 111 MG/DL (ref 70–99)
HCT VFR BLD AUTO: 33 % (ref 40–53)
HGB BLD-MCNC: 10.8 G/DL (ref 13.3–17.7)
MCH RBC QN AUTO: 30 PG (ref 26.5–33)
MCHC RBC AUTO-ENTMCNC: 32.7 G/DL (ref 31.5–36.5)
MCV RBC AUTO: 92 FL (ref 78–100)
PLATELET # BLD AUTO: 189 10E3/UL (ref 150–450)
POTASSIUM SERPL-SCNC: 4.3 MMOL/L (ref 3.4–5.3)
RBC # BLD AUTO: 3.6 10E6/UL (ref 4.4–5.9)
SODIUM SERPL-SCNC: 138 MMOL/L (ref 136–145)
WBC # BLD AUTO: 12.8 10E3/UL (ref 4–11)

## 2023-07-03 PROCEDURE — 250N000013 HC RX MED GY IP 250 OP 250 PS 637: Performed by: SURGERY

## 2023-07-03 PROCEDURE — 82310 ASSAY OF CALCIUM: CPT | Performed by: PHYSICIAN ASSISTANT

## 2023-07-03 PROCEDURE — 71045 X-RAY EXAM CHEST 1 VIEW: CPT | Mod: 26 | Performed by: RADIOLOGY

## 2023-07-03 PROCEDURE — 85027 COMPLETE CBC AUTOMATED: CPT

## 2023-07-03 PROCEDURE — 82330 ASSAY OF CALCIUM: CPT | Performed by: STUDENT IN AN ORGANIZED HEALTH CARE EDUCATION/TRAINING PROGRAM

## 2023-07-03 PROCEDURE — 71046 X-RAY EXAM CHEST 2 VIEWS: CPT | Mod: 26 | Performed by: RADIOLOGY

## 2023-07-03 PROCEDURE — 97530 THERAPEUTIC ACTIVITIES: CPT | Mod: GO | Performed by: OCCUPATIONAL THERAPIST

## 2023-07-03 PROCEDURE — 250N000013 HC RX MED GY IP 250 OP 250 PS 637: Performed by: STUDENT IN AN ORGANIZED HEALTH CARE EDUCATION/TRAINING PROGRAM

## 2023-07-03 PROCEDURE — 71045 X-RAY EXAM CHEST 1 VIEW: CPT

## 2023-07-03 PROCEDURE — 250N000011 HC RX IP 250 OP 636: Performed by: STUDENT IN AN ORGANIZED HEALTH CARE EDUCATION/TRAINING PROGRAM

## 2023-07-03 PROCEDURE — 74018 RADEX ABDOMEN 1 VIEW: CPT

## 2023-07-03 PROCEDURE — 250N000013 HC RX MED GY IP 250 OP 250 PS 637: Performed by: PHYSICIAN ASSISTANT

## 2023-07-03 PROCEDURE — 71046 X-RAY EXAM CHEST 2 VIEWS: CPT

## 2023-07-03 PROCEDURE — 36415 COLL VENOUS BLD VENIPUNCTURE: CPT | Performed by: STUDENT IN AN ORGANIZED HEALTH CARE EDUCATION/TRAINING PROGRAM

## 2023-07-03 PROCEDURE — 214N000001 HC R&B CCU UMMC

## 2023-07-03 PROCEDURE — 250N000013 HC RX MED GY IP 250 OP 250 PS 637

## 2023-07-03 PROCEDURE — 74018 RADEX ABDOMEN 1 VIEW: CPT | Mod: 26 | Performed by: RADIOLOGY

## 2023-07-03 RX ORDER — METOPROLOL TARTRATE 25 MG/1
25 TABLET, FILM COATED ORAL 2 TIMES DAILY
Status: DISCONTINUED | OUTPATIENT
Start: 2023-07-03 | End: 2023-07-04 | Stop reason: HOSPADM

## 2023-07-03 RX ORDER — METOPROLOL TARTRATE 25 MG/1
25 TABLET, FILM COATED ORAL 2 TIMES DAILY
Status: DISCONTINUED | OUTPATIENT
Start: 2023-07-03 | End: 2023-07-03

## 2023-07-03 RX ORDER — FUROSEMIDE 40 MG
40 TABLET ORAL ONCE
Status: COMPLETED | OUTPATIENT
Start: 2023-07-03 | End: 2023-07-03

## 2023-07-03 RX ADMIN — Medication 12.5 MG: at 09:33

## 2023-07-03 RX ADMIN — ACETAMINOPHEN 975 MG: 325 TABLET, FILM COATED ORAL at 00:36

## 2023-07-03 RX ADMIN — METOPROLOL TARTRATE 25 MG: 25 TABLET, FILM COATED ORAL at 19:56

## 2023-07-03 RX ADMIN — METOPROLOL TARTRATE 12.5 MG: 25 TABLET, FILM COATED ORAL at 11:20

## 2023-07-03 RX ADMIN — ROSUVASTATIN CALCIUM 20 MG: 20 TABLET, FILM COATED ORAL at 09:33

## 2023-07-03 RX ADMIN — FUROSEMIDE 40 MG: 40 TABLET ORAL at 09:34

## 2023-07-03 RX ADMIN — HEPARIN SODIUM 5000 UNITS: 5000 INJECTION, SOLUTION INTRAVENOUS; SUBCUTANEOUS at 13:17

## 2023-07-03 RX ADMIN — HEPARIN SODIUM 5000 UNITS: 5000 INJECTION, SOLUTION INTRAVENOUS; SUBCUTANEOUS at 04:00

## 2023-07-03 RX ADMIN — HEPARIN SODIUM 5000 UNITS: 5000 INJECTION, SOLUTION INTRAVENOUS; SUBCUTANEOUS at 19:57

## 2023-07-03 RX ADMIN — PANTOPRAZOLE SODIUM 40 MG: 40 TABLET, DELAYED RELEASE ORAL at 09:32

## 2023-07-03 RX ADMIN — ASPIRIN 162 MG: 81 TABLET, COATED ORAL at 09:32

## 2023-07-03 RX ADMIN — ACETAMINOPHEN 975 MG: 325 TABLET, FILM COATED ORAL at 09:35

## 2023-07-03 ASSESSMENT — ACTIVITIES OF DAILY LIVING (ADL)
ADLS_ACUITY_SCORE: 25
ADLS_ACUITY_SCORE: 28
ADLS_ACUITY_SCORE: 25
ADLS_ACUITY_SCORE: 25

## 2023-07-03 NOTE — OP NOTE
Procedure Date: 06/30/2023    PREOPERATIVE DIAGNOSES:    1.  Coronary artery disease.  2.  Left ventricular dysfunction.    POSTOPERATIVE DIAGNOSES:   1.  Coronary artery disease.  2.  Left ventricular dysfunction.    PROCEDURE:  Coronary artery bypass grafting x3 (left internal mammary artery to left anterior descending artery, saphenous vein graft to posterior descending artery, saphenous vein graft to first obtuse marginal artery).    SURGEON:  Sushant Ferrer MD    ASSISTANTS:  Maria Teresa Serna MD; Dorinda Ann PA-C    OPERATIVE INDICATIONS:  The patient is a 62-year-old gentleman with severe triple-vessel coronary artery disease with reduced LV dysfunction.  I discussed risks and benefits of surgery with the patient's family including risk of death, stroke, bleeding, wound failure, renal failure, arrhythmias.  They decided to proceed with surgery.    OPERATIVE FINDINGS:  The patient's sternum was of adequate quality.  Pericardial space was free of any adhesions.  Veins obtained were adequate for bypass grafting.  Left internal mammary artery was adequate quality, good flow.  Vessels bypassed on the left anterior descending artery, which was a 1.75 mm vessel diffusely calcified and the posterior descending artery, which was a 2 mm vessel with proximal stenosis and the first obtuse marginal artery, which was 2.25 mm with proximal stenosis.    DESCRIPTION OF PROCEDURE:  The patient was brought to the operating room in stable condition for the administration of general anesthesia.  The patient's chest, abdomen, lower extremities were prepped and draped in sterile manner.  A long segment of saphenous vein was harvested from the left lower extremity from the left greater saphenous vein using endoscopic vein harvest techniques.  Simultaneously, a median sternotomy was performed.  The left internal mammary artery was harvested from its bed and dilated with topical papaverine.  Preparation of cardiopulmonary bypass included  ACT-guided heparinization and admission of [facility].  Aorta was cannulated with a 20-Khmer arterial cannula via 3 0 tevdek pursestring pledgeted sutures x2.  Dual stage venous cannula was inserted in the right atrium.  Antegrade and retrograde cardioplegia cannulae were placed.  Full cardiopulmonary bypass was initiated.  Aortic pressure was temporarily reduced and the aorta was crossclamped.  One liter of cold blood cardioplegia was administered by the antegrade and retrograde routes.  Subsequent doses of cardioplegia were given at no greater than 20-minute intervals via the retrograde route as well as via the completed vein grafts.  A reverse segment of saphenous vein was anastomosed end-to-side to an arteriotomy in the mid portion of the posterior descending artery using 7-0 Prolene.  A second reverse segment of saphenous vein was anastomosed end-to-side to an arteriotomy in the mid portion of the first obtuse marginal artery using 7-0 Prolene.  Distal end of the left internal mammary artery was anastomosed end-to-side to an arteriotomy in the mid portion of the left anterior descending artery using 7-0 Prolene.  Proximal ends of the 2 saphenous vein grafts were anastomosed to two 4 mm aortotomies using 6-0 Prolene.  Warm dose of retrograde hotshot cardioplegia was given with high suction on the aortic root vent, the cross-clamp was released.  Organized rhythm resumed without the need for any defibrillations.  Rewarming and reperfusion was allowed.  De-airing was confirmed by echography.  The patient gradually weaned off cardiopulmonary bypass with low dose epinephrine.  Following termination of cardiopulmonary bypass, LV function was approximately 45%-50% with normal wall motion, normal sinus electrocardiogram.  Protamine was given.  All cannulas removed.  Careful hemostasis was obtained.  Two anterior mediastinal and 1 left pleural chest tube was placed.  Sternum was closed with surgical steel wires.  Fascia,  subcutaneous tissue, skin of chest were closed in layers.  The patient transferred to ICU in stable critical condition.    Sushant Ferrer MD        D: 2023   T: 2023   MT: emily    Name:     LATONYA MAYERS  MRN:      [id number]        Account:        [id number]   :      1960           Procedure Date: 2023     Document: [id number]    cc:  MD Alvaro Choi MD [facility]

## 2023-07-03 NOTE — PROGRESS NOTES
"CLINICAL NUTRITION SERVICES    Reason for Assessment:  Heart-healthy nutrition education, received consult.    Diet History:  Pt and wife were in his room. Pt reports no history of receiving formal heart-healthy nutrition education in the past. Pt and wife mention they have heard a little bit about heart-healthy eating in the past and have done some internet searches (per pt, yielding that \"greens and seeds\" may be about the only good things to eat). Agreeable to nutrition education.     Nutrition Diagnosis:  Food- and nutrition-related knowledge deficit r/t no previous knowledge of heart-healthy diet AEB pt report of no previous formal heart-healthy nutrition education.    Nutrition Prescription/Recs:  Continue heart-healthy diet.      Interventions:  Nutrition Education (pt and wife): Provided verbal instruction on a heart-healthy diet. Discussed the various types of fats, limiting sodium, eating adequate fruits and vegetables, limiting simple carbohydrate (and sweets), choosing whole grains, and keeping a healthy weight. Gave suggestions of potential modifications. Discussed label reading. Provided handouts: How to Read Nutrition Labels and Nutrition Care Manual Handouts on Heart-Healthy Nutrition Therapy.    Goals:    Pt will list at least two interventions to make current meal plan more heart-healthy.     Follow-up:   Patient to ask any further nutrition-related questions before discharge. In addition, pt may request outpatient RD appointment.     Cristy Sweet, MS, RD, LD, [facility]   6C/5A(beds 5201 - 5206) RD pgr: [phone]   "

## 2023-07-03 NOTE — PLAN OF CARE
Jerald Lawrence is a 62 year old male with PMH of hx of tobacco abuse. HFrEF (35%), HTN, HLD, CAD who underwent CABG x 3 on 6/30/2023 by Dr. Ferrer.   Shift 15:00-23:30  Neuro: A&O x 4, pleasant.   Resp: Lungs diminished. IS encouraged. Sats low 90s on RA. Slight EID with ambulation.Has one remaining pleural CT to suction.  CV: -130s, 0-8 PACs. Other VSS.  GI/: Eating well, adequate urine output, had large BM, feels better, senna held.  Mobility: Moves well in bed. Moves well independently in the room and ambulating frequently in the mcrae.  Pain: Denies pain. On scheduled tylenol. Told to ask for pain meds and stay on top of it if it should come back.  Skin: Mid sternal and leg incisions CDI, healing.  Plan: Assist as needed. CVTS with issues.

## 2023-07-03 NOTE — PROGRESS NOTES
Final pleural chest tube removed today. Pt denies any pain throughout shift. Ambulating independently. Voiding ok; last BM yesterday.   Sternal and LLE ncisions healing well. New pressure drsg in place from CT removal.   Likely discharge to home tomorrow.

## 2023-07-03 NOTE — PLAN OF CARE
Pt admitted 6/30 for CABG x 3. PMH includes tobacco use, HFrEF (35%), HTN, and HLD.    Neuro: A&O x 4. Afebrile. Pleasant affect. Calls appropriately. Slept well overnight.  Cardiac: ST 100s. SBP 100s-120s. Denies dizziness, chest pain, or palpitations.   Respiratory: Sating well on RA. LS clear. Denies cough. CT x 1 to suction.   GI/: Good UOP via urinal. Pt previously not measuring urine. Educated on the need to accurately monitor Is & Os. LBM 7/2. Denies nausea.   Endocrine: ACHS  Diet/Appetite: Moderate carb/low fat/2.4G Na  Skin: Sternal incision STEFFANIE and WNL. CT dressing CDI.   LDA: L PIV SL x 2  Activity: Up ad elisa. Walking halls this morning.   Pain: Denies pain. Scheduled Tylenol given.     Plan: Continue to monitor and alert team with any changes or concerns.

## 2023-07-03 NOTE — PROGRESS NOTES
Cardiovascular Surgery Progress Note  07/03/2023         Assessment and Plan:     Jerald Lawrence is a 62-year-old gentleman who underwent a coronary angiogram recently that showed significant 2-vessel disease.  He had been having intermittent chest pain with exertion over the last several months.  He complained of chest pain that was increasing in intensity and frequency, either chest discomfort or heaviness while walking, and he denied any symptoms at rest. Pre-op EF 43%, mild to moderate anterior wall hypokinesis, mild to moderate lateral wall hypokinesis.  Coronary angiogram showed completely occluded coronary artery disease and fills with collaterals, 70% posterior descending artery, 99% first diagonal artery.  Patient is now s/p scheduled 3 vessel CAB with Dr. Sushant Ferrer on 6/30/23.    Cardiovascular:   Hx of CAD   S/p 3 vessel CAB  Sinus tachycardia  No arrhythmia, sinus tachycardia with PVCs. HD stable.  Intra-op echo showed LV EF 50% with normal global LV/RF function.  -  mg  - PTA rosuvastatin 20 mg   - Increased metoprolol tartrate to 25 mg BID from 12.5 mg BID since 7/2 for heart rate control     Chest tubes: 2 mediastinal removed 7/2, Left pleural removed 7/3  TPW: capped 7/1 in ICU, removed 7/2.    Pulmonary:  Left Pleural Effusion  - Extubated POD 0 to 4 lpm via NC. Now saturating well on room air  - Supplemental O2 PRN to keep sats > 92%. Wean off as tolerated.  - Pulm toilet, IS, activity and deep breathing  - 24 hour output of L pleural tube 70 mL, removed 7/3 without complication.    Neurology / MSK:  - Neuro intact  - Acute post-operative pain well controlled with acetaminophen PRN, methocarbamol PRN, PO oxycodone PRN. Discontinued IV dilaudid.     / Renal:  - No Hx of renal disease. Most recent creatinine WNL, adequate UOP. Chou out 7/1 and voiding well.   - Pre-op weight 197 lbs, most recent weight 199 lbs.  - Diuresis: transition to Lasix 40 mg PO once on 7/3 from Lasix 20 mg PO  once. Reassess needs daily.    GI / FEN:   Post-op nausea & abdominal distention, resolved  - Regular diet with supplements, continue bowel regimen. +BM on 7/2 evening.   - Replace electrolytes as needed, hepatic enzymes WNL.  - Abdominal distension: simethicone x 8 doses on 7/2-7/3  - 7/3 abdominal xray with non-obstructive bowel gas patter    Endocrine:  Pre-op Hgb A1C 5.4%  Stress induced hyperglycemia initially managed on insulin drip postop, transitioned to sliding scale. Now discontinued with BG <180 and no insulin need.    Infectious Disease:  Stress induced leukocytosis  - WBC 12.8 and trending down, remains afebrile, no signs or symptoms of infection  - Completed perioperative antibiotics    Hematology:   Acute blood loss anemia and thrombocytopenia  Hgb 10.8; Plt 189, no signs or symptoms of active bleeding    Anticoagulation:   - ASA only    Prophylaxis:   - Stress ulcer prophylaxis: Pantoprazole 40 mg daily for 30 days  - DVT prophylaxis: Subcutaneous heparin, SCD    Disposition:   - Transferred to  on 7/1  - Therapies recommending discharge to home with assist, anticipate 7/4.     Discussed with Dr Sushant Ferrer through written communication.      Berta Drievr PA-C  Cardiothoracic Surgery  Pager [phone]    8:34 AM July 3, 2023        Interval History:   No overnight events.  Pain is well managed on current regimen. Slept adequately overnight.   Tolerating diet though patient reports minimal appetite. Is passing flatus and had a large bowel movement 7/2. No recurrent nausea or emesis. No abdominal pain or diarrhea.  Breathing well, occasional non-productive cough. Using IS frequently.  Working with therapies and ambulating in halls with assistance.  Denies chest pain, palpitations, dizziness, syncopal symptoms, fevers/sweat/chills, or sternal popping/clicking.         Physical Exam:   Blood pressure 119/72, pulse 116, temperature 98.6  F (37  C), temperature source Oral, resp. rate 18, height 1.778 m  "(5' 10\"), weight 90.4 kg (199 lb 3.2 oz), SpO2 95 %.  Vitals:    07/01/23 0400 07/02/23 0747 07/03/23 0400   Weight: 90.3 kg (199 lb 1.2 oz) 92.5 kg (204 lb) 90.4 kg (199 lb 3.2 oz)      Weight; + 2 lbs since admit, trending down  24 hr Fluid status; net gain 210 mL but inaccurate with unmeasured voids.  mL, unmeasured voids at least 2X  MAPs:     Gen: A&Ox4, NAD  Neuro: Intact with no focal deficits   CV: RRR, normal S1 S2, no murmurs, rubs or gallops. No JVD appreciated.  Pulm: CTA, no wheezing or rhonchi, normal breathing on RA  Abd: nondistended, normal BS, soft, nontender  Ext: trace peripheral edema, no pitting  Skin:  Sternal incision: clean, dry, intact, no erythema, sternum stable  LVH sites: clean, dry, open to air, no erythema  Tubes/drain sites: dressing clean and dry, L pleural tube removed 7/3 without complication         Data:    Imaging:  reviewed recent imaging, no acute concerns. Persistent left pleural effusion, stable. Abdominal xray with non-obstructive bowel gas pattern.    Labs:  BMP  Recent Labs   Lab 07/03/23  0612 07/02/23  2120 07/02/23  1713 07/02/23  1227 07/02/23  0815 07/02/23  0645 07/01/23  0405 07/01/23  0359 06/30/23  2344 06/30/23  2315     --   --   --   --  138  --  138  --  143   POTASSIUM 4.3  --   --   --   --  4.6  --  4.1  --  4.1   CHLORIDE 102  --   --   --   --  102  --  104  --  109*   HORACIO 8.9  --   --   --   --  9.0  --  8.4*  --  7.9*   CO2 28  --   --   --   --  28  --  25  --  23   BUN 20.7  --   --   --   --  19.1  --  17.9  --  16.3   CR 0.87  --   --   --   --  0.89  --  0.87  --  0.87   * 110* 114* 139*   < > 114*   < > 135*   < > 140*    < > = values in this interval not displayed.     CBC  Recent Labs   Lab 07/03/23  0612 07/02/23  1247 07/02/23  0645 07/01/23  0359 06/30/23  1412   WBC 12.8*  --  15.5* 15.9* 24.0*   RBC 3.60*  --  3.83* 3.86* 4.15*   HGB 10.8* 11.7* 11.1* 11.4* 12.1*   HCT 33.0*  --  35.7* 33.6* 37.2*   MCV 92  --  " 93 87 90   MCH 30.0  --  29.0 29.5 29.2   MCHC 32.7  --  31.1* 33.9 32.5   RDW 12.2  --  12.5 12.2 12.1     --  180 198 227     INR  Recent Labs   Lab 06/30/23  1412 06/30/23  1230 06/30/23  0545   INR 1.31* 1.39* 1.05      Hepatic Panel  Recent Labs   Lab 07/01/23  0359 06/30/23  1412   AST 44 42   ALT 23 27   ALKPHOS 63 72   BILITOTAL 0.3 0.4   ALBUMIN 3.3* 3.5     GLUCOSE:   Recent Labs   Lab 07/03/23  0612 07/02/23  2120 07/02/23  1713 07/02/23  1227 07/02/23  0815 07/02/23  0645   * 110* 114* 139* 110* 114*

## 2023-07-04 ENCOUNTER — TELEPHONE (OUTPATIENT)
Dept: SURGERY | Facility: CLINIC | Age: 63
End: 2023-07-04

## 2023-07-04 ENCOUNTER — APPOINTMENT (OUTPATIENT)
Dept: GENERAL RADIOLOGY | Facility: CLINIC | Age: 63
DRG: 235 | End: 2023-07-04
Payer: COMMERCIAL

## 2023-07-04 VITALS
SYSTOLIC BLOOD PRESSURE: 107 MMHG | OXYGEN SATURATION: 92 % | HEIGHT: 70 IN | WEIGHT: 199.2 LBS | BODY MASS INDEX: 28.52 KG/M2 | DIASTOLIC BLOOD PRESSURE: 74 MMHG | RESPIRATION RATE: 16 BRPM | HEART RATE: 91 BPM | TEMPERATURE: 99.1 F

## 2023-07-04 LAB
ANION GAP SERPL CALCULATED.3IONS-SCNC: 11 MMOL/L (ref 7–15)
ATRIAL RATE - MUSE: 82 BPM
ATRIAL RATE - MUSE: 85 BPM
ATRIAL RATE - MUSE: 86 BPM
BUN SERPL-MCNC: 18.2 MG/DL (ref 8–23)
CALCIUM SERPL-MCNC: 9 MG/DL (ref 8.8–10.2)
CHLORIDE SERPL-SCNC: 99 MMOL/L (ref 98–107)
CREAT SERPL-MCNC: 0.87 MG/DL (ref 0.67–1.17)
DEPRECATED HCO3 PLAS-SCNC: 26 MMOL/L (ref 22–29)
DIASTOLIC BLOOD PRESSURE - MUSE: NORMAL MMHG
ERYTHROCYTE [DISTWIDTH] IN BLOOD BY AUTOMATED COUNT: 12.3 % (ref 10–15)
GFR SERPL CREATININE-BSD FRML MDRD: >90 ML/MIN/1.73M2
GLUCOSE SERPL-MCNC: 109 MG/DL (ref 70–99)
HCT VFR BLD AUTO: 34.5 % (ref 40–53)
HGB BLD-MCNC: 11 G/DL (ref 13.3–17.7)
INTERPRETATION ECG - MUSE: NORMAL
MCH RBC QN AUTO: 29.1 PG (ref 26.5–33)
MCHC RBC AUTO-ENTMCNC: 31.9 G/DL (ref 31.5–36.5)
MCV RBC AUTO: 91 FL (ref 78–100)
P AXIS - MUSE: 31 DEGREES
P AXIS - MUSE: 41 DEGREES
P AXIS - MUSE: 46 DEGREES
PLAT MORPH BLD: ABNORMAL
PLATELET # BLD AUTO: 295 10E3/UL (ref 150–450)
POLYCHROMASIA BLD QL SMEAR: SLIGHT
POTASSIUM SERPL-SCNC: 3.7 MMOL/L (ref 3.4–5.3)
PR INTERVAL - MUSE: 130 MS
PR INTERVAL - MUSE: 142 MS
PR INTERVAL - MUSE: 154 MS
QRS DURATION - MUSE: 78 MS
QRS DURATION - MUSE: 84 MS
QRS DURATION - MUSE: 86 MS
QT - MUSE: 334 MS
QT - MUSE: 376 MS
QT - MUSE: 416 MS
QTC - MUSE: 399 MS
QTC - MUSE: 447 MS
QTC - MUSE: 486 MS
R AXIS - MUSE: -6 DEGREES
R AXIS - MUSE: -9 DEGREES
R AXIS - MUSE: -9 DEGREES
RBC # BLD AUTO: 3.78 10E6/UL (ref 4.4–5.9)
RBC MORPH BLD: ABNORMAL
SODIUM SERPL-SCNC: 136 MMOL/L (ref 136–145)
SYSTOLIC BLOOD PRESSURE - MUSE: NORMAL MMHG
T AXIS - MUSE: 51 DEGREES
T AXIS - MUSE: 66 DEGREES
T AXIS - MUSE: 76 DEGREES
VENTRICULAR RATE- MUSE: 82 BPM
VENTRICULAR RATE- MUSE: 85 BPM
VENTRICULAR RATE- MUSE: 86 BPM
WBC # BLD AUTO: 12.7 10E3/UL (ref 4–11)

## 2023-07-04 PROCEDURE — 250N000011 HC RX IP 250 OP 636: Performed by: STUDENT IN AN ORGANIZED HEALTH CARE EDUCATION/TRAINING PROGRAM

## 2023-07-04 PROCEDURE — 82310 ASSAY OF CALCIUM: CPT | Performed by: PHYSICIAN ASSISTANT

## 2023-07-04 PROCEDURE — 250N000013 HC RX MED GY IP 250 OP 250 PS 637: Performed by: SURGERY

## 2023-07-04 PROCEDURE — 71046 X-RAY EXAM CHEST 2 VIEWS: CPT

## 2023-07-04 PROCEDURE — 85027 COMPLETE CBC AUTOMATED: CPT

## 2023-07-04 PROCEDURE — 250N000013 HC RX MED GY IP 250 OP 250 PS 637

## 2023-07-04 PROCEDURE — 250N000013 HC RX MED GY IP 250 OP 250 PS 637: Performed by: PHYSICIAN ASSISTANT

## 2023-07-04 PROCEDURE — 36415 COLL VENOUS BLD VENIPUNCTURE: CPT | Performed by: PHYSICIAN ASSISTANT

## 2023-07-04 PROCEDURE — 71046 X-RAY EXAM CHEST 2 VIEWS: CPT | Mod: 26 | Performed by: RADIOLOGY

## 2023-07-04 RX ORDER — POTASSIUM CHLORIDE 1500 MG/1
20 TABLET, EXTENDED RELEASE ORAL DAILY
Qty: 4 TABLET | Refills: 0 | Status: SHIPPED | OUTPATIENT
Start: 2023-07-04 | End: 2023-07-08

## 2023-07-04 RX ORDER — ASPIRIN 81 MG/1
162 TABLET ORAL DAILY
Start: 2023-07-05 | End: 2023-12-20

## 2023-07-04 RX ORDER — METHOCARBAMOL 750 MG/1
750 TABLET, FILM COATED ORAL EVERY 6 HOURS PRN
Qty: 12 TABLET | Refills: 1 | Status: SHIPPED | OUTPATIENT
Start: 2023-07-04 | End: 2023-07-21

## 2023-07-04 RX ORDER — PANTOPRAZOLE SODIUM 40 MG/1
40 TABLET, DELAYED RELEASE ORAL
Qty: 14 TABLET | Refills: 0 | Status: SHIPPED | OUTPATIENT
Start: 2023-07-05 | End: 2023-07-21

## 2023-07-04 RX ORDER — ACETAMINOPHEN 325 MG/1
650 TABLET ORAL EVERY 4 HOURS PRN
COMMUNITY
Start: 2023-07-04

## 2023-07-04 RX ORDER — FUROSEMIDE 20 MG
20 TABLET ORAL DAILY
Status: DISCONTINUED | OUTPATIENT
Start: 2023-07-04 | End: 2023-07-04 | Stop reason: HOSPADM

## 2023-07-04 RX ORDER — AMOXICILLIN 250 MG
1-2 CAPSULE ORAL DAILY PRN
COMMUNITY
Start: 2023-07-04 | End: 2023-07-21

## 2023-07-04 RX ORDER — FUROSEMIDE 20 MG
20 TABLET ORAL DAILY
Qty: 4 TABLET | Refills: 0 | Status: SHIPPED | OUTPATIENT
Start: 2023-07-04 | End: 2023-07-21

## 2023-07-04 RX ORDER — POLYETHYLENE GLYCOL 3350 17 G/17G
17 POWDER, FOR SOLUTION ORAL DAILY PRN
Qty: 510 G | COMMUNITY
Start: 2023-07-04 | End: 2023-07-21

## 2023-07-04 RX ORDER — OXYCODONE HYDROCHLORIDE 5 MG/1
5 TABLET ORAL EVERY 6 HOURS PRN
Qty: 6 TABLET | Refills: 0 | Status: SHIPPED | OUTPATIENT
Start: 2023-07-04 | End: 2023-07-21

## 2023-07-04 RX ORDER — POTASSIUM CHLORIDE 750 MG/1
20 TABLET, EXTENDED RELEASE ORAL DAILY
Status: DISCONTINUED | OUTPATIENT
Start: 2023-07-04 | End: 2023-07-04 | Stop reason: HOSPADM

## 2023-07-04 RX ORDER — METOPROLOL TARTRATE 25 MG/1
25 TABLET, FILM COATED ORAL 2 TIMES DAILY
Qty: 60 TABLET | Refills: 1 | Status: SHIPPED | OUTPATIENT
Start: 2023-07-04 | End: 2023-07-05

## 2023-07-04 RX ADMIN — PANTOPRAZOLE SODIUM 40 MG: 40 TABLET, DELAYED RELEASE ORAL at 08:03

## 2023-07-04 RX ADMIN — HEPARIN SODIUM 5000 UNITS: 5000 INJECTION, SOLUTION INTRAVENOUS; SUBCUTANEOUS at 11:25

## 2023-07-04 RX ADMIN — POTASSIUM CHLORIDE 20 MEQ: 750 TABLET, EXTENDED RELEASE ORAL at 10:47

## 2023-07-04 RX ADMIN — ASPIRIN 162 MG: 81 TABLET, COATED ORAL at 08:03

## 2023-07-04 RX ADMIN — SIMETHICONE 80 MG: 80 TABLET, CHEWABLE ORAL at 08:03

## 2023-07-04 RX ADMIN — FUROSEMIDE 20 MG: 20 TABLET ORAL at 10:47

## 2023-07-04 RX ADMIN — ROSUVASTATIN CALCIUM 20 MG: 20 TABLET, FILM COATED ORAL at 08:03

## 2023-07-04 RX ADMIN — HEPARIN SODIUM 5000 UNITS: 5000 INJECTION, SOLUTION INTRAVENOUS; SUBCUTANEOUS at 03:51

## 2023-07-04 RX ADMIN — METOPROLOL TARTRATE 25 MG: 25 TABLET, FILM COATED ORAL at 08:03

## 2023-07-04 ASSESSMENT — ACTIVITIES OF DAILY LIVING (ADL)
ADLS_ACUITY_SCORE: 24

## 2023-07-04 NOTE — PLAN OF CARE
Temp: 98.9  F (37.2  C) Temp src: Oral BP: 117/80 Pulse: 105   Resp: 18 SpO2: 95 % O2 Device: None (Room air)       D: S/p scheduled 3 vessel CAB (6/3/23)    I/A: Jerald is A&Ox4. Tele in place - NSR. VSS on room air. LPIV in place SL. Up ad elisa. Denies chest pain, numbness or tingling, and SOB. Regular diet. Regular Mg, K, and Phosphorus protocol.     P: Continue to monitor and follow POC. Possible discharge planned for today. Notify CVTS with changes.

## 2023-07-04 NOTE — DISCHARGE INSTRUCTIONS
AFTER YOU GO HOME FROM YOUR HEART SURGERY  (Three vessel coronary artery bypass surgery - 6/30/2023)    You had a sternotomy, avoid lifting anything greater than ten pounds for 6 weeks after surgery and then less than 20 pounds for an additional 6 weeks.   Do not reach backwards or use arms to push out of chair.   Do not let people pull on your arms to assist with standing.   Avoid twisting or reaching too far across your body.    Avoid strenuous activities such as bowling, vacuuming, raking, shoveling, golf or tennis for 12 weeks after your surgery.   It is okay to resume sex if you feel comfortable in doing so. You may have to try different positions with your partner.    Splint your chest incision by hugging a pillow or bringing your arms across your chest when coughing or sneezing.     No driving for 4 weeks after surgery or while on pain medication.     Shower or wash your incisions daily with soap and water (or as instructed), pat dry.   Keep wound clean and dry, showers are okay after discharge, but don't let spray hit directly on incision.   No baths or swimming for 1 month.   Cover chest tube sites with dry gauze until they stop draining, then leave open to air. It is not abnormal for chest tube sites to drain yellowish/clear fluid for up to 2-3 weeks after surgery.   Watch for signs of infection: increased redness, tenderness, warmth or any drainage that appears infected (pus like) or is persistent.  Also a temperature > 100.5 F or chills. Call your surgeon or primary care provider's office immediately.   Remove any skin glue left on incisions after 10-14 days. This will not affect your incision and can speed up healing.    Exercise is very important in your recovery. Please follow the guidelines set up for you in your cardiac rehab classes at the hospital. If outpatient cardiac rehab was ordered for you, we highly recommend you participate. If you have problems arranging your cardiac rehab, please  call [phone] for all locations, with the exception of [place], please call [phone] and [place], please call [phone].    Avoid sitting for prolonged periods of time, try to walk every hour during the day. If you have a leg incision, elevate your leg often when you are not walking.    Check your weight when you get home from the hospital and continue to check it daily through your recovery for at least a month. If you notice a weight gain of 2-3 pounds in a week, notify your primary care physician, cardiologist or surgeon.    Bowel activity may be slow after surgery. If necessary, you may take an over the counter laxative such as milk of magnesia or Miralax. You may have stool softeners prescribed (docusate sodium, Senokot). We recommend using stool softeners while using narcotics for pain (oxycodone/percocet, hydrocodone/vicodin, hydromorphone/dilaudid).      Wean OFF of narcotics (oxycodone, dilaudid, hydrocodone) as soon as possible. You should continue taking acetaminophen as long as you have any surgical pain as the first choice for pain control and add narcotics as necessary for pain to be tolerable.      DO NOT SMOKE.  IF YOU NEED HELP QUITTING, PLEASE TALK WITH YOUR CARDIOLOGIST OR PRIMARY DOCTOR.    REGARDING PRESCRIPTION REFILLS.  If you need a refill on your pain medication contact us to discuss your pain and a possible one time refill.   All other medications will be adjusted, discontinued and re-filled by your primary care physician and/or your cardiologist as they were prior to your surgery. We have given you enough for one to three month with possibly one refill.    POST-OPERATIVE CLINIC VISITS  You will have a follow up visit in [facility] Advance Practice Provider Clinic on at the [facility].  You will then return to the care of your primary provider and your cardiologist. Future medication refills should come from your PCP or Cardiologist.   You should see your  primary care provider about 1-2 weeks after discharge.   It is important to see your cardiologist about 4 weeks after discharge.    If you do not hear from a  in 7 days, please call [phone] (choose option 1) and request to be seen with a general cardiologist or someone that you have seen in the past.   If there is a need to return to see CT Surgery please call our  at [phone].    SURGICAL QUESTIONS  Please call on of the RN Coordinators listed below with surgical recovery and medication questions.  They will assist you with your needs and contact other surgery care team members as indicated.    On weekends or after hours, please call [phone] and ask the  to page the Cardiothoracic Surgery fellow on call.      Thank you,    Your Cardiothoracic Surgery Team   Maurice Alberto, RN Care Coordinator -  [phone]  Danielle Molina, RN Care Coordinator - [phone]  Quyen Rodriguez RN Care Coordinator - [phone]  Anneliese Ya, RN Care Coordinator - [phone]  Shruti Jung, RN Care Coordinator - [phone]

## 2023-07-04 NOTE — PROGRESS NOTES
DISCHARGE     Discharged to: Home  Via: Automobile  Accompanied by: Family-Wife Whitley  Discharge Instructions: diet, activity, medications, follow up appointments, when to call the MD, and what to watchout for (i.e. s/s of infection, increasing SOB, palpitations, chest pain,)  Prescriptions: To be filled by [facility] pharmacy per pt's request; medication list reviewed & sent with pt  Follow Up Appointments: arranged; information given  Belongings: All sent with pt  IV: out  Telemetry: off  Pt exhibits understanding of above discharge instructions; all questions answered.  Discharge Paperwork: Copy given to patient.

## 2023-07-04 NOTE — DISCHARGE SUMMARY
[facility], [place]   Cardiothoracic Surgery Hospital Discharge Summary     Jerald Lawrence MRN# [id number]   Age: 62 year old YOB: 1960     Admitting Physician:  Sushant Ferrer MD  Discharge Physician:  Iftikhar Saba NP  Primary Care Physician:        Skip Rubio      DATE OF ADMISSION: 6/30/2023      DATE OF DISCHARGE: 7/4/23    Admit Wt: 197 lbs 1.2 oz  Discharge Wt: 199 lbs 3.2 oz         Primary Diagnoses:     Coronary artery disease          Secondary Diagnoses:     Acute post-operative pain, resolved    Stress-induced hyperglycemia, resolved    Stress-induced leukocytosis, improving    Acute blood loss anemia, stable    Left pleural effusion, stable    Sinus tachycardia, stable    Postop nausea, resolved    Abdominal bloating, resolved    PROCEDURES PERFORMED:   Date: 6/30/2023  Surgeon: Dr. Ferrer    Procedure/Surgery Information   Procedure: Procedure(s):  Median Sternotomy, Cardiopulmonary Byass, Endovascular Greater Saphenous Vein [place] Left Leg, Coronary Artery Bypass Grafting x 3, IntraoperativeTransesophageal Echocardiogram (Anesthsia)   Surgeon(s): Surgeon(s) and Role:     * Sushnat Ferrer MD - Primary     * Dorinda Ann PA-C - Assisting     * Maria Teresa Serna MD - Fellow - Assisting   Specimens: * No specimens in log *         INTRAOPERATIVE FINDINGS:    The patient's sternum was of adequate quality.  Pericardial space was free of any adhesions.  Veins obtained were adequate for bypass grafting.  Left internal mammary artery was adequate quality, good flow.  Vessels bypassed on the left anterior descending artery, which was a 1.75 mm vessel diffusely calcified and the posterior descending artery, which was a 2 mm vessel with proximal stenosis and the first obtuse marginal artery, which was 2.25 mm with proximal stenosis.    CONSULTS:      PT/OT    BRIEF HISTORY OF ILLNESS:  The patient is a 62-year-old gentleman with severe triple-vessel  coronary artery disease with reduced LV dysfunction.     HOSPITAL COURSE: Jerald Lawrence is a 62 year old male who on 6/30/2023 underwent the above-named procedures and tolerated the operation well.     Postoperatively was admitted to the CVICU.  Patient was fast-tract extubated on POD 0.  Blood pressure and cardiac index were managed with vasopressors and inotropic agents which were continuously weaned until no longer needed.       Patient was transiently hyperglycemic and treated with insulin infusion then transitioned to sliding scale insulin per protocol. Blood sugars remained stable such that exogenous insulin was no longer required and no further glycemic control agents will be required at discharge.    Patient was subsequently transferred to the surgical telemetry floor on POD 1    While on the surgical unit, the patient continued to progress well. Chest tubes and temporary pacemaker wires were removed when deemed appropriate.     Patient was fluid overloaded and treated with diuretics. He remains up about 2 lbs from preoperative weight and will discharge with 4 days of diuretic therapy; ongoing need will require re-evaluation during clinic follow-up.      Prior to discharge, his pain was controlled well, he was working well with therapies, able to perform most ADLs, ambulate without difficulty, and had full return of bowel and bladder function.  On 7/4/23 he was discharged home in stable condition. Follow up with Cardiology and Cardiac Surgery have been arranged. Pt encouraged to follow up with PCP and Cardiac Rehab upon discharge.    Patient discharged on aspirin:  Yes 81 mg  Patient discharged on beta blocker: yes - metoprolol   Patient discharged on ACE Inhibitor/ARB: no - BP will not tolerate    Patient discharged on statin: yes - rosuvastatin         Discharge Disposition:     Discharged to home            Condition on Discharge:     Discharge condition: Good   Discharge vitals: /83 (BP Location:  "Left arm)   Pulse 108   Temp 98.6  F (37  C) (Oral)   Resp 16   Ht 1.778 m (5' 10\")   Wt 90.4 kg (199 lb 3.2 oz)   SpO2 95%   BMI 28.58 kg/m     Code status on discharge: Full Code     Vitals:    07/02/23 0747 07/03/23 0400 07/04/23 0350   Weight: 92.5 kg (204 lb) 90.4 kg (199 lb 3.2 oz) 90.4 kg (199 lb 3.2 oz)       DAY OF DISCHARGE PHYSICAL EXAM:    Gen: NAD, dozing quietly in bed, calm, pleasant, conversant, cooperative on exam  Neuro: A&Ox4, no focal deficits, face symmetric, speech clear  CV: regular borderline s tach on monitor, WWP  Pulm: unlabored breathing on RA  Abd: SNTND  Ext: mild peripheral edema - L>R, incisions C/D/I with skin glue  Incision: clean, dry, intact with skin glue in place, no erythema, sternum stable  Tubes/drain sites: dressing C/D, no surrounding erythema or induration    BMP  Recent Labs   Lab 07/04/23  0716 07/03/23  0612 07/02/23  2120 07/02/23  1713 07/02/23  0815 07/02/23  0645 07/01/23  0405 07/01/23  0359    138  --   --   --  138  --  138   POTASSIUM 3.7 4.3  --   --   --  4.6  --  4.1   CHLORIDE 99 102  --   --   --  102  --  104   HORACIO 9.0 8.9  --   --   --  9.0  --  8.4*   CO2 26 28  --   --   --  28  --  25   BUN 18.2 20.7  --   --   --  19.1  --  17.9   CR 0.87 0.87  --   --   --  0.89  --  0.87   * 111* 110* 114*   < > 114*   < > 135*    < > = values in this interval not displayed.     CBC  Recent Labs   Lab 07/04/23  0716 07/03/23  0612 07/02/23  1247 07/02/23  0645 07/01/23  0359   WBC 12.7* 12.8*  --  15.5* 15.9*   RBC 3.78* 3.60*  --  3.83* 3.86*   HGB 11.0* 10.8* 11.7* 11.1* 11.4*   HCT 34.5* 33.0*  --  35.7* 33.6*   MCV 91 92  --  93 87   MCH 29.1 30.0  --  29.0 29.5   MCHC 31.9 32.7  --  31.1* 33.9   RDW 12.3 12.2  --  12.5 12.2    189  --  180 198     INR  Recent Labs   Lab 06/30/23  1412 06/30/23  1230 06/30/23  0545   INR 1.31* 1.39* 1.05      Hepatic Panel  Recent Labs   Lab 07/01/23  0359 06/30/23  1412   AST 44 42   ALT 23 27 "   ALKPHOS 63 72   BILITOTAL 0.3 0.4   ALBUMIN 3.3* 3.5     Recent Labs   Lab 07/04/23  0716 07/03/23  0612 07/02/23  2120 07/02/23  1713 07/02/23  1227 07/02/23  0815   * 111* 110* 114* 139* 110*       CXR:  Recent Results (from the past 24 hour(s))   XR Chest Port 1 View    Narrative    Portable chest 7/3/2023 at 1047 hours    INDICATION: Post chest tube pull    COMPARISON: 0900 hours earlier today    FINDINGS: No radiopaque chest tube evident. Heart is borderline  enlarged. Costophrenic angle blunting on the left and haziness in the  left lower lung field along with prominent retrocardiac density  appears similar allowing for changes in positioning.  Right midlung subsegmental atelectasis is less dense as well. No  pneumothorax. Median sternotomy.      Impression    IMPRESSION: Overall no significant changes with a left pleural  effusion and lower lung atelectasis.    CHARANJIT ROJAS MD         SYSTEM ID:  [id number]   XR Chest 2 Views    Narrative    Exam: XR CHEST 2 VIEWS, 7/4/2023 8:44 AM    Indication: s/p chest tube pull    Comparison: 7/3/2023    Findings:   Intact median sternotomy wires. Mediastinal surgical clips. Stable  enlarged cardiac silhouette. The pulmonary vasculature is within  normal limits. Stable small left and trace right pleural effusions.  Suspected trace left apical pneumothorax. Stable streaky perihilar  opacities and dense left basilar opacity. No new focal airspace  opacity.      Impression    Impression:  1. Stable left greater than right pleural effusions and perihilar/left  basilar atelectasis.   2. Suspected trace left apical pneumothorax. Recommend attention on  follow-up.    BRUNA GOMEZ DO         SYSTEM ID:  [id number]         PRE-ADMISSION MEDICATIONS:  Medications Prior to Admission   Medication Sig Dispense Refill Last Dose     Multiple Vitamin (MULTI-VITAMIN DAILY) TABS Take 1 tablet by mouth daily   Past Month at AM     rosuvastatin (CRESTOR) 20 MG tablet Take 1  tablet (20 mg) by mouth daily 90 tablet 3 6/29/2023 at AM     [DISCONTINUED] aspirin 81 MG EC tablet Take 81 mg by mouth daily   6/29/2023 at AM     [DISCONTINUED] metoprolol succinate ER (TOPROL XL) 25 MG 24 hr tablet Take 0.5 tablets (12.5 mg) by mouth daily 30 tablet 3 6/30/2023 at AM     [DISCONTINUED] nitroGLYcerin (NITROSTAT) 0.4 MG sublingual tablet For chest pain place 1 tablet under the tongue every 5 minutes for 3 doses. If symptoms persist 5 minutes after 1st dose call 911. 10 tablet 3          DISCHARGE MEDICATIONS:      Review of your medicines      START taking      Dose / Directions   acetaminophen 325 MG tablet  Commonly known as: TYLENOL      Dose: 650 mg  Take 2 tablets (650 mg) by mouth every 4 hours as needed for other (For optimal non-opioid multimodal pain management to improve pain control.)  Refills: 0     furosemide 20 MG tablet  Commonly known as: LASIX      Dose: 20 mg  Take 1 tablet (20 mg) by mouth daily for 4 days  Quantity: 4 tablet  Refills: 0     methocarbamol 750 MG tablet  Commonly known as: ROBAXIN      Dose: 750 mg  Take 1 tablet (750 mg) by mouth every 6 hours as needed for muscle spasms  Quantity: 12 tablet  Refills: 1     metoprolol tartrate 25 MG tablet  Commonly known as: LOPRESSOR      Dose: 25 mg  Take 1 tablet (25 mg) by mouth 2 times daily  Quantity: 60 tablet  Refills: 1     oxyCODONE 5 MG tablet  Commonly known as: ROXICODONE      Dose: 5 mg  Take 1 tablet (5 mg) by mouth every 6 hours as needed for severe pain  Quantity: 6 tablet  Refills: 0     pantoprazole 40 MG EC tablet  Commonly known as: PROTONIX      Dose: 40 mg  Start taking on: July 5, 2023  Take 1 tablet (40 mg) by mouth every morning (before breakfast)  Quantity: 14 tablet  Refills: 0     polyethylene glycol 17 GM/Dose powder  Commonly known as: MIRALAX      Dose: 17 g  Take 17 g by mouth daily as needed for constipation  Quantity: 510 g  Refills: 0     potassium chloride ER 20 MEQ CR tablet  Commonly known  as: KLOR-CON M      Dose: 20 mEq  Take 1 tablet (20 mEq) by mouth daily for 4 days  Quantity: 4 tablet  Refills: 0     senna-docusate 8.6-50 MG tablet  Commonly known as: SENOKOT-S/PERICOLACE      Dose: 1-2 tablet  Take 1-2 tablets by mouth daily as needed for constipation  Refills: 0        CONTINUE these medicines which may have CHANGED, or have new prescriptions. If we are uncertain of the size of tablets/capsules you have at home, strength may be listed as something that might have changed.      Dose / Directions   aspirin 81 MG EC tablet  This may have changed: how much to take      Dose: 162 mg  Start taking on: July 5, 2023  Take 2 tablets (162 mg) by mouth daily  Refills: 0        CONTINUE these medicines which have NOT CHANGED      Dose / Directions   Multi-Vitamin Daily Tabs      Dose: 1 tablet  Take 1 tablet by mouth daily  Refills: 0     rosuvastatin 20 MG tablet  Commonly known as: Crestor  Used for: Recurrent chest pain, Left ventricular hypertrophy by electrocardiogram, Abnormal nuclear stress test      Dose: 20 mg  Take 1 tablet (20 mg) by mouth daily  Quantity: 90 tablet  Refills: 3        STOP taking    metoprolol succinate ER 25 MG 24 hr tablet  Commonly known as: Toprol XL        nitroGLYcerin 0.4 MG sublingual tablet  Commonly known as: NITROSTAT              Where to get your medicines      These medications were sent to [facility] - [place], [place] - [address]  [address] [facility] [address] [address]    Phone: [phone]     furosemide 20 MG tablet    methocarbamol 750 MG tablet    metoprolol tartrate 25 MG tablet    oxyCODONE 5 MG tablet    pantoprazole 40 MG EC tablet    potassium chloride ER 20 MEQ CR tablet     Some of these will need a paper prescription and others can be bought over the counter. Ask your nurse if you have questions.    You don't need a prescription for these medications    acetaminophen 325 MG tablet    polyethylene glycol 17 GM/Dose  powder    senna-docusate 8.6-50 MG tablet         CC  Skip Rubio, CNP, ACNPC-AG  [facility]   Cardiothoracic Surgery  Office phone: [phone]  Office fax: [phone]

## 2023-07-04 NOTE — PLAN OF CARE
D: Admitted s/p CABG x3 w/Dr. Ferrer on 6/30/23  PMH of CAD     I: Monitored vitals and assessed pt status.      A: A0x4. Afebrile. VSS. HRs 90s-100s. Sinus rhythm/ tachycardia. Sats >92% on RA. Pt denies any shortness of breath, chest pain/palpitations, nausea, dizziness, or chills. Sternal incision WNL. Leg graft incision sites WNL.  Old chest tube sites changed, site WNL. Pt on 2g Na+ diet. Pt is up independently.      P: Discharge this afternoon pending medical stability.  Continue to monitor pt status and notify CVTS treatment team with any changes or concerns.     Goal Outcome Evaluation: Reviewed with patient and spouse

## 2023-07-04 NOTE — PLAN OF CARE
Jerald Lawrence is a 62 year old male with PMH of hx of tobacco abuse. HFrEF (35%), HTN, HLD, CAD who underwent CABG x 3 on 6/30/2023 by Dr. Ferrer.   Shift 15:00-23:30  Neuro: A&O x 4, pleasant.   Resp: Lungs diminished. IS encouraged. Sats low 90s on RA. Slight EID with ambulation.All CTs out.  CV: SR 80s-100s. Other VSS.  GI/: Eating well, adequate urine output, had large BM yesterday feels better, senna held.  Mobility: Moves well in bed. Moves well independently in the room and ambulating frequently in the mcrae.  Pain: Denies pain. On scheduled tylenol.   Skin: Mid sternal and leg incisions CDI, healing.  Plan: Assist as needed. CVTS with issues.Possible discharge tomorrow.

## 2023-07-05 ENCOUNTER — PATIENT OUTREACH (OUTPATIENT)
Dept: FAMILY MEDICINE | Facility: CLINIC | Age: 63
End: 2023-07-05
Payer: COMMERCIAL

## 2023-07-05 ENCOUNTER — TELEPHONE (OUTPATIENT)
Dept: CARDIOLOGY | Facility: CLINIC | Age: 63
End: 2023-07-05
Payer: COMMERCIAL

## 2023-07-05 DIAGNOSIS — I25.10 CORONARY ARTERY DISEASE INVOLVING NATIVE CORONARY ARTERY OF NATIVE HEART, UNSPECIFIED WHETHER ANGINA PRESENT: ICD-10-CM

## 2023-07-05 RX ORDER — METOPROLOL TARTRATE 25 MG/1
25 TABLET, FILM COATED ORAL 2 TIMES DAILY
COMMUNITY
Start: 2023-07-05 | End: 2023-07-26

## 2023-07-05 NOTE — TELEPHONE ENCOUNTER
Due to low blood pressure,  Change metoprolol tartrate to 12.5 mg twice a day.  Hold metoprolol if blood pressure less than 90/45.  Make sure he follows up with primary care provider.  Sincerely,  Xavier Baxter MD

## 2023-07-05 NOTE — TELEPHONE ENCOUNTER
"Hospital/TCU/ED for chronic condition Discharge Protocol    \"Hi, my name is Arlene Dennis RN, a registered nurse, and I am calling from [facility].  I am calling to follow up and see how things are going for you after your recent emergency visit/hospital/TCU stay.\"    Tell me how you are doing now that you are home?\" Patient reports he is doing very well, his wife Whitley is near him on speaker phone and he would like RN to review medications with wife and ok to discuss medication info. Whitley says patient had a low grade fever of 101.7 yesterday which wife called surgeon about and was advised Tylenol to take, the patient was told that if fevers do not improve or worsen then return to the ER. Whitley says the patient's temp after tylenol was 100.7, so she will monitor it. No other concerns with graft site.      Discharge Instructions    \"Let's review your discharge instructions.  What is/are the follow-up recommendations?  Pt. Response: reviewed, Whitley has a question regarding B/P and the metoprolol tartrate 25 mg BID as patient was having low blood pressure readings yesterday morning. Will place new note above for PCP/covering provider to review.    \"Has an appointment with your primary care provider been scheduled?\"   Yes. (confirm)    \"When you see the provider, I would recommend that you bring your medications with you.\"    Medications    \"Tell me what changed about your medicines when you discharged?\"    Changes to chronic meds?    2 or more - Epic MTM referral needed    \"What questions do you have about your medications?\"    None     New diagnoses of heart failure, COPD, diabetes, or MI?    CAD              Post Discharge Medication Reconciliation Status: discharge medications reconciled, continue medications without change.    Was MTM referral placed (*Make sure to put transitions as reason for referral)?   No, patient verbalizes understanding of the new changes.    Call Summary    \"What questions " "or concerns do you have about your recent visit and your follow-up care?\"     none    \"If you have questions or things don't continue to improve, we encourage you contact us through the main clinic number (give number).  Even if the clinic is not open, triage nurses are available 24/7 to help you.     We would like you to know that our clinic has extended hours (provide information).  We also have urgent care (provide details on closest location and hours/contact info)\"      \"Thank you for your time and take care!\"      ALEXANDER Anton               "

## 2023-07-05 NOTE — TELEPHONE ENCOUNTER
Dr. Rubio/ out of office provider:    Patient's wife Whitley (verbal consent to communicate today from the patient) has a question regarding the Metoprolol tartrate 25 mg BID after ED follow up call. Whitley checked patient's blood pressure yesterday evening around 6:30 pm and was low at 91/61 and at 7:30 was 79/57. Then today at 6:45 am before the Metoprolol tartrate 25 mg he as 112/79, pulse 104. Patient has taken the Metoprolol already, but should he hold it if B/P too low? Parameters? Also the nitroglycerin was discontinued, but wife says should patient take it if has angina pain?       Please advise on these two questions.      ALEXANDER Anton

## 2023-07-05 NOTE — TELEPHONE ENCOUNTER
Patient's wife Whitley called and notified with Dr. Baxter's response, wife is told to call the care team if patient develops angina or chest pain, but if wife thinks patient is actively having angina pain then give the nitroglycerin only if needed but to ask this question to PCP at follow up for more info.      ALEXANDER Anton

## 2023-07-05 NOTE — TELEPHONE ENCOUNTER
"Returned patient call regarding post-operative fever. Patient is POD #4 and was discharged today after his CABG with Dr. Ferrer on 6/30. Spoke on speaker phone with patient and his wife.     He reports since arriving home a few hours ago his temperature has fluctuated mostly between 101.2 and 101.3 but did peak at 101.7. It is now back to 101.2. Other than feeling warm, patient denies any subjective symptoms. He reports he generally feels \"good\" right now. Denies chills, denies SOB or significant CP. Denies lightheadedness. He reports that he had one episode of SOB for a brief moment after standing up quickly from a chair he had been sitting in for a \"long time.\" but otherwise has no subjective complaints. He and his wife called because the instructions were to call if temp was >101. Patient has not taken any antipyretics.    Upon review of hospital documentation he was discharged today at 1pm. His incisions looked \"clean, dry, intact with skin glue in place, no erythema, sternum stable\" his Temp was 98.6, BP was 110/83 and HR was 108 at discharge WBC was 12.7 and had been steadily downtrending. He reports that his HR is currently 101 and BP has been ranging in the 100s/70s. Although it did have a single dip to 87/59 on home cuff. He has metoprolol at home to manage HR but asked if it was okay to take since he knows it can lower BP. Of note, patient does not have afib.    Discussed at length with patient and his wife regarding treatment options. Discussed that a fever can be a sign of infection and that a post-operative infection can be dangerous. Discussed the following options with the patient and his wife    1) they could drive to the [place] [facility] to be evaluated. They would be seen by me tonight and I would discuss the fever with a member of Dr. Ferrer's team. Discussed the pros of this plan being that our team knows the patient and have cardiac surgery expertise. The cons of this plan are that it is a 40 " "mile drive for them to arrive here.    2) They can present to the [place] ED to be evaluated. This ED likely has surgeons on call who have expertise in post-operative fever, but may not have cardiac surgeons. However, if the patient starts feeling or doing significantly worse, including a drop in blood pressure, this close ED would be the safest option.    3) They could try one dose of tylenol (500 mg) and closely watch his temperature. If it goes down, but comes back up again throughout the night. They should present to one of the EDs for evaluation. If it goes down and stays down it would be reasonable to stay at home. However, it was stressed that repeated doses of tylenol to keep his temperature below 101 does not treat the cause of his fever and is not an advisable plan.     The patient and his wife chose to go with option 3. I informed them that I think this is a reasonable plan but that if his BP starts to drop, his HR continues to climb, his temperature sustains above 101 or he just subjectively starts to feel worse or \"crummy\" he should present to either [place] or the [place] ED for evaluation. Patient and his wife verbalized understanding of this plan. They were also informed that they can reach me again by calling the  and that I would be available by that method all night.    Jamie Kaplan MD  [place] Resident, General Surgery  Overnight SICU/Consult Resident   "

## 2023-07-05 NOTE — TELEPHONE ENCOUNTER
[facility] Call Center    Phone Message    May a detailed message be left on voicemail: yes     Reason for Call: Other: Patient's spouse Whitley called wanting some clarification on appt that is scheduled for 07/07. Whitley believes this appt was scheduled as a follow up for after patient has Bypass surgery on 06/20, but since it was changed to 06/30 for the surgery they will like to know if this appt is still needed or if it's too early. Please call Whitley back to further discuss.      Action Taken: Other: Cardiology    Travel Screening: Not Applicable     Thank you!  [facility]

## 2023-07-05 NOTE — TELEPHONE ENCOUNTER
Talked with patient. Advised that this was a rescheduled post cath visit, so since he had a CABG on 6/30/23, he didn't need this visit. He states he is doing very well so he feels he doesn't need it either. Will be having cardiac rehab 3x/week, sees his PCP on 7/13/23; CVTS on 7/21/23 and Dr Ibarra on 9/14/23. Instructed that if he has any problems or concerns after the July appts, he can call us and we can schedule him with Marian Aguilar NP prior to the visit with Dr Ibarra. Pt verbalized understanding and agreed with plan. Meseret Mulligan RN Cardiology July 5, 2023, 9:41 AM

## 2023-07-05 NOTE — PLAN OF CARE
Occupational Therapy Discharge Summary    Reason for therapy discharge:    Discharged to home with outpatient therapy.    Progress towards therapy goal(s). [facility] for goal details.  Goals partially met.  Barriers to achieving goals:   discharge from facility.    Therapy recommendation(s):    Continued therapy is recommended.  Rationale/Recommendations:  OP CR to increase functional endurance..

## 2023-07-06 ENCOUNTER — TELEPHONE (OUTPATIENT)
Dept: CARDIOLOGY | Facility: CLINIC | Age: 63
End: 2023-07-06
Payer: COMMERCIAL

## 2023-07-06 NOTE — TELEPHONE ENCOUNTER
Cardiovascular Surgery  [facility]    DISCHARGE FOLLOW UP PHONE CALL      POST OP MONITORING  How is your pain on a 0-10 scale, how are you managing your pain? Not having very much pain, taking tylenol only- mostly for a fever. Patient reports that on the day of his discharge after he arrived home temperature went up to 101.7, at that time he had low blood pressure- the lowest being 84 systolic with a HR of 116. He called an on call provider that night who advised to come into the ER if he had any more BPs reading that low. The following day patient spoke to PCP who decreased his metoprolol dose. Patient is now feeling better and has temperatures around 98F consistently since yesterday, systolic pressures are consistently above 100.  Patient denies having signs/symptoms of infection to incisions. He is now back to taking 12.5 mg metoprolol BID as prescribed at discharge.      ACTIVITY  How is your activity tolerance? Getting at least 30 minutes of activity each day  Are you using your incentive spirometer? Getting 1250 ml consistently. Discussed when to call regarding decreased IS numbers or SOB.  Are you still doing sternal precautions? Yes  Do you hear any clicking when you are moving or taking a deep breath? No    Are you weighing yourself daily? Today was 196 lbs yesterday was 196.2lbs. discussed when to call for weight gain.  Are you monitoring your blood pressure and heart rate? BP this morning was 114/78 with HR of 94 before medications.     SIGNS AND SYMPTOMS OF INFECTION    Incisions appear to be healing well per patient.         ASSISTANCE  Do you have someone at home to assist you with your daily activities? Spouse    MEDICATIONS  Is someone helping you to set up your medications? Spouse  Do you have any questions about your medications? No    Are you on a blood thinner? No      FOLLOW UP  You are scheduled to see your primary care physician on Patient .  You are scheduled to see our  surgery advanced practive provider for post operative follow up on 7/21.  You are scheduled for cardiac rehab on 7/12.  You are scheduled to see your cardiologist on 9/14, will schedule earlier appointment with [facility].      CONTACT INFORMATION  Please feel free to call us with any questions or symptoms that are concerning for you at [phone]. If it is after 4:00 in the afternoon, or a weekend please call [phone] and ask for the on call specialist. We want to do everything we can to help prevent you needing to return to the hospital, so please do not hesitate to call us.      RACHELE NOVOA, RN  Care Coordinator - [facility] CV Surgery   [facility]  [phone]

## 2023-07-10 ENCOUNTER — HOSPITAL ENCOUNTER (OUTPATIENT)
Dept: CARDIAC REHAB | Facility: CLINIC | Age: 63
Discharge: HOME | End: 2023-07-10
Attending: SURGERY
Payer: COMMERCIAL

## 2023-07-10 DIAGNOSIS — Z95.1 S/P CABG (CORONARY ARTERY BYPASS GRAFT): ICD-10-CM

## 2023-07-10 PROCEDURE — 93797 PHYS/QHP OP CAR RHAB WO ECG: CPT | Mod: XU | Performed by: CLINICAL EXERCISE PHYSIOLOGIST

## 2023-07-10 PROCEDURE — 93798 PHYS/QHP OP CAR RHAB W/ECG: CPT | Performed by: CLINICAL EXERCISE PHYSIOLOGIST

## 2023-07-12 ENCOUNTER — HOSPITAL ENCOUNTER (OUTPATIENT)
Dept: CARDIAC REHAB | Facility: CLINIC | Age: 63
Discharge: HOME | End: 2023-07-12
Attending: FAMILY MEDICINE
Payer: COMMERCIAL

## 2023-07-12 PROCEDURE — 93798 PHYS/QHP OP CAR RHAB W/ECG: CPT

## 2023-07-13 ENCOUNTER — OFFICE VISIT (OUTPATIENT)
Dept: FAMILY MEDICINE | Facility: CLINIC | Age: 63
End: 2023-07-13
Payer: COMMERCIAL

## 2023-07-13 VITALS
SYSTOLIC BLOOD PRESSURE: 120 MMHG | DIASTOLIC BLOOD PRESSURE: 70 MMHG | HEIGHT: 70 IN | RESPIRATION RATE: 14 BRPM | TEMPERATURE: 97.8 F | HEART RATE: 72 BPM | WEIGHT: 191 LBS | BODY MASS INDEX: 27.35 KG/M2

## 2023-07-13 DIAGNOSIS — Z95.1 S/P CABG X 3: ICD-10-CM

## 2023-07-13 DIAGNOSIS — D75.839 THROMBOCYTOSIS: ICD-10-CM

## 2023-07-13 DIAGNOSIS — Z12.11 SCREEN FOR COLON CANCER: ICD-10-CM

## 2023-07-13 DIAGNOSIS — Z12.5 SCREENING FOR PROSTATE CANCER: ICD-10-CM

## 2023-07-13 DIAGNOSIS — D62 ANEMIA DUE TO BLOOD LOSS, ACUTE: ICD-10-CM

## 2023-07-13 DIAGNOSIS — I25.10 CORONARY ARTERY DISEASE INVOLVING NATIVE CORONARY ARTERY OF NATIVE HEART, UNSPECIFIED WHETHER ANGINA PRESENT: Primary | ICD-10-CM

## 2023-07-13 LAB
ERYTHROCYTE [DISTWIDTH] IN BLOOD BY AUTOMATED COUNT: 12.2 % (ref 10–15)
HCT VFR BLD AUTO: 34.2 % (ref 40–53)
HGB BLD-MCNC: 10.9 G/DL (ref 13.3–17.7)
MCH RBC QN AUTO: 28.8 PG (ref 26.5–33)
MCHC RBC AUTO-ENTMCNC: 31.9 G/DL (ref 31.5–36.5)
MCV RBC AUTO: 90 FL (ref 78–100)
PLATELET # BLD AUTO: 683 10E3/UL (ref 150–450)
PSA SERPL DL<=0.01 NG/ML-MCNC: 0.81 NG/ML (ref 0–4.5)
RBC # BLD AUTO: 3.79 10E6/UL (ref 4.4–5.9)
WBC # BLD AUTO: 12.1 10E3/UL (ref 4–11)

## 2023-07-13 PROCEDURE — 99213 OFFICE O/P EST LOW 20 MIN: CPT | Performed by: FAMILY MEDICINE

## 2023-07-13 PROCEDURE — G0103 PSA SCREENING: HCPCS | Performed by: FAMILY MEDICINE

## 2023-07-13 PROCEDURE — 36415 COLL VENOUS BLD VENIPUNCTURE: CPT | Performed by: FAMILY MEDICINE

## 2023-07-13 PROCEDURE — 85027 COMPLETE CBC AUTOMATED: CPT | Performed by: FAMILY MEDICINE

## 2023-07-13 ASSESSMENT — PAIN SCALES - GENERAL: PAINLEVEL: NO PAIN (0)

## 2023-07-13 NOTE — PROGRESS NOTES
Assessment & Plan     Coronary artery disease involving native coronary artery of native heart, unspecified whether angina present  S/P CABG x 3  Stable post-op.  Reviewed meds with patient and spouse - no need to change any.  He verified he has appt to see surgeon soon, and cardiology in Sept..  Cardiac rehab has started.  Healing well thoracotomy incision.  Reinforced heart healthy lifestyle.  Return precautions discussed and given to patient.     Anemia due to blood loss, acute  Expect to continue to improve and normalize in next few weeks.  - CBC with platelets  - CBC with platelets    Screening for prostate cancer  Patient asked for PSA to be measured due to brother recently diagnosed with prostate cancer.  Patient has no urinary symptoms though.  - Prostate Specific Antigen Screen  - Prostate Specific Antigen Screen    Screen for colon cancer  Patient is now considering colonoscopy.  He will check with cardiology on his follow up visit as to when he can pursue the procedure.   MED REC REQUIRED  Post Medication Reconciliation Status: discharge medications reconciled, continue medications without change      There are no Patient Instructions on file for this visit.    Skip Rubio MD  [facility] FERNIE Marques is a 62 year old, presenting for the following health issues:  Hospital F/U        7/13/2023     9:07 AM   Additional Questions   Roomed by Marian   Accompanied by Whitley-wife     HPI       Hospital Follow-up Visit:    Hospital/Nursing Home/IP Rehab Facility: [facility]  Date of Admission: 06/30/2023  Date of Discharge: 07/04/2023   Reason(s) for Admission: Cardiopulmonary Byass    Was your hospitalization related to COVID-19? No   Problems taking medications regularly:  None  Medication changes since discharge: None  Problems adhering to non-medication therapy:  None    Summary of hospitalization:  [facility]  "hospital discharge summary reviewed  Diagnostic Tests/Treatments reviewed.  Follow up needed: cardiothoracic surgery and cardiology  Other Healthcare Providers Involved in Patient s Care:         Specialist appointment - cardiology, cardiac rehab and Surgical follow-up appointment - cardiothoracic surgery  Update since discharge: improved.     Pain control is adequate.  Has started cardiac rehab yesterday.  Denies need for refill of medication.      Plan of care communicated with patient and spouse.             Review of Systems   Constitutional, HEENT, cardiovascular, pulmonary, GI, , musculoskeletal, neuro, skin, endocrine and psych systems are negative, except as otherwise noted.      Objective    /70 (BP Location: Left arm, Patient Position: Sitting, Cuff Size: Adult Regular)   Pulse 72   Temp 97.8  F (36.6  C) (Tympanic)   Resp 14   Ht 1.778 m (5' 10\")   Wt 86.6 kg (191 lb)   BMI 27.41 kg/m    Body mass index is 27.41 kg/m .  Physical Exam   GENERAL:  alert and no distress, ambulatory w/o assist  NECK: no tenderness, no adenopathy,  Thyroid not enlarged  RESP: lungs clear to auscultation - no rales, no rhonchi, no wheezes  CV: regular rates and rhythm, normal S1 S2, no S3 or S4 and no murmur, no click or rub  MS: extremities- no gross deformities noted, no edema  SKIN: no suspicious lesions, no rashes; small purplish ecchymosis on the LLQ of abdomen; graft source site on the left medial lower extremity with ecchymosis present but not indurated.  CHEST: thoracotomy incision site healing well, equal chest expansion, no tenderness on palpation, no deformity     No results displayed because visit has over 200 results.                  "

## 2023-07-14 ENCOUNTER — HOSPITAL ENCOUNTER (OUTPATIENT)
Dept: CARDIAC REHAB | Facility: CLINIC | Age: 63
Discharge: HOME | End: 2023-07-14
Attending: FAMILY MEDICINE
Payer: COMMERCIAL

## 2023-07-14 PROCEDURE — 93798 PHYS/QHP OP CAR RHAB W/ECG: CPT

## 2023-07-17 ENCOUNTER — HOSPITAL ENCOUNTER (OUTPATIENT)
Dept: CARDIAC REHAB | Facility: CLINIC | Age: 63
Discharge: HOME | End: 2023-07-17
Attending: FAMILY MEDICINE
Payer: COMMERCIAL

## 2023-07-17 PROCEDURE — 93798 PHYS/QHP OP CAR RHAB W/ECG: CPT

## 2023-07-19 ENCOUNTER — HOSPITAL ENCOUNTER (OUTPATIENT)
Dept: CARDIAC REHAB | Facility: CLINIC | Age: 63
Discharge: HOME | End: 2023-07-19
Attending: FAMILY MEDICINE
Payer: COMMERCIAL

## 2023-07-19 PROCEDURE — 93798 PHYS/QHP OP CAR RHAB W/ECG: CPT

## 2023-07-21 ENCOUNTER — OFFICE VISIT (OUTPATIENT)
Dept: CARDIOLOGY | Facility: CLINIC | Age: 63
End: 2023-07-21
Attending: SURGERY
Payer: COMMERCIAL

## 2023-07-21 VITALS
BODY MASS INDEX: 27.2 KG/M2 | HEART RATE: 69 BPM | DIASTOLIC BLOOD PRESSURE: 77 MMHG | OXYGEN SATURATION: 98 % | SYSTOLIC BLOOD PRESSURE: 132 MMHG | WEIGHT: 189.6 LBS

## 2023-07-21 DIAGNOSIS — Z95.1 S/P CABG (CORONARY ARTERY BYPASS GRAFT): Primary | ICD-10-CM

## 2023-07-21 PROCEDURE — G0463 HOSPITAL OUTPT CLINIC VISIT: HCPCS

## 2023-07-21 PROCEDURE — 99024 POSTOP FOLLOW-UP VISIT: CPT | Performed by: NURSE PRACTITIONER

## 2023-07-21 ASSESSMENT — PAIN SCALES - GENERAL: PAINLEVEL: NO PAIN (0)

## 2023-07-21 NOTE — PATIENT INSTRUCTIONS
Your surgery was on June 30, 2023    Ok to start driving four weeks after the date of surgery as long as you are no longer taking narcotic pain medications.    From the date of surgery: no lifting greater than 10 pounds for 6-8 weeks, then no lifting greater than 20 pounds for an additional 6 weeks.    Do not soak your incisions until fully healed over with no scabbing; this includes hot tubs, baths, pools etc.    If you have questions or concerns, please call us:    Anneliese Jung  RN Care Coordinators  [phone]  Maurice Alberto, RN Care Coordinator - [phone]   Danielle Molina, RN Care Coordinator - [phone]  Quyen Rodriguez, RN Care Coordinator - [phone]

## 2023-07-21 NOTE — PROGRESS NOTES
"CARDIOTHORACIC SURGERY FOLLOW-UP VISIT     Jerald Lawrence   1960   [id number]      Reason for visit: Post-Op CABG with Dr. Ferrer on 6/30/2023    ASSESSMENT/PLAN:  Jerald Lawrence is a 63 year old year old male who returns to clinic for postop visit.     Severe multivessel CAD S/P CABGx3  Systolic heart failure with LVEF 43%  Today in clinic patient sounds to be in a regular rhythm with HR <100 bpm. NSR at cardiac rehab.  Discharge weight 199 lbs; weight today 189 lbs; Appears euvolemic upon examinination with no appreciable JVD, BLE edema, abdominal bloating. LS are CTA.  Discussed the importance of nutrition in healing and staying adequately hydrated.   Midline sternal incision healing well with no noted erythema or drainage or signs of infection. Increasing activity and strength overall.      Hemodynamics are stable. Medications reviewed and no changes were needed today.    Follow up with your cardiologist as scheduled.    Continue Outpatient Cardiac Rehab until completed.     Continue sternal precautions for 12 weeks from surgery date.     No driving for 4 weeks from surgery date.     Postoperative restrictions have been reviewed and all of the patient's questions were answered. Our contact information was given to the patient if he should have any further questions/concerns. No further follow up is needed with us.  The total time spent with the patient was 25 minutes, > 50% of which was spent in counseling and coordination of care.    ANAIS Irene, ACN-AG, CCRN  Nurse Practitioner  Cardiothoracic Surgery  Pager: [phone]    HPI: Per discharge summary:  \"Jerald Lawrence is a 62 year old male who on 6/30/2023 underwent the above-named procedures and tolerated the operation well.      Postoperatively was admitted to the CVICU.  Patient was fast-tract extubated on POD 0.  Blood pressure and cardiac index were managed with vasopressors and inotropic agents which were continuously weaned until no longer " "needed.        Patient was transiently hyperglycemic and treated with insulin infusion then transitioned to sliding scale insulin per protocol. Blood sugars remained stable such that exogenous insulin was no longer required and no further glycemic control agents will be required at discharge.     Patient was subsequently transferred to the surgical telemetry floor on POD 1     While on the surgical unit, the patient continued to progress well. Chest tubes and temporary pacemaker wires were removed when deemed appropriate.     Patient was fluid overloaded and treated with diuretics. He remains up about 2 lbs from preoperative weight and will discharge with 4 days of diuretic therapy; ongoing need will require re-evaluation during clinic follow-up.      Prior to discharge, his pain was controlled well, he was working well with therapies, able to perform most ADLs, ambulate without difficulty, and had full return of bowel and bladder function.  On 7/4/23 he was discharged home in stable condition. Follow up with Cardiology and Cardiac Surgery have been arranged. Pt encouraged to follow up with PCP and Cardiac Rehab upon discharge.\"    Returns to clinic today for postoperative visit.    Since discharge, has been recovering well  at home.    States pain is well controlled. Needing no medications for pain management. Sleep is improved and getting 7-8 hour stretches. Participating in therapy at cardiac rehab, has been going well. Breathing has been stable/improving. Continues to work on C &DB. Denies SOB at rest, PND, orthopnea. No cough. Had 101 fever the first night home but none since. Patient denies any fever, chills, chest pain, palpitations, edema, SOB, lightheadedness, nausea and vomiting.  Has had a good and improving appetite. Having regular bowel movements and voiding without difficulty.  Denies  sternal popping or clicking or incisional drainage/erythema. No psychiatric concerns.    PAST MEDICAL HISTORY:  Past " Medical History:   Diagnosis Date     Coronary artery disease 6/30/2023     Unspecified part of closed fracture of clavicle     comminuted displaced fx of left clavicle       PAST SURGICAL HISTORY:  Past Surgical History:   Procedure Laterality Date     BYPASS GRAFT ARTERY CORONARY N/A 6/30/2023    Procedure: Median Sternotomy, Cardiopulmonary Byass, Endovascular Greater Saphenous Vein [place] Left Leg, Coronary Artery Bypass Grafting x 3, IntraoperativeTransesophageal Echocardiogram (Anesthsia);  Surgeon: Sushant Ferrer MD;  Location: [facility] OR     CV CORONARY ANGIOGRAM N/A 5/31/2023    Procedure: Coronary Angiogram;  Surgeon: Alvaro Acosta MD;  Location:  HEART CARDIAC CATH LAB     CV LEFT HEART CATH N/A 5/31/2023    Procedure: Left Heart Catheterization;  Surgeon: Alvaro Acosta MD;  Location:  HEART CARDIAC CATH LAB     CV LEFT VENTRICULOGRAM N/A 5/31/2023    Procedure: Left Ventriculogram;  Surgeon: Alvaro Acosta MD;  Location:  HEART CARDIAC CATH LAB     CV PCI N/A 5/31/2023    Procedure: Percutaneous Coronary Intervention;  Surgeon: Alvaro Acosta MD;  Location:  HEART CARDIAC CATH LAB       CURRENT MEDICATIONS:   Current Outpatient Medications   Medication     acetaminophen (TYLENOL) 325 MG tablet     aspirin 81 MG EC tablet     furosemide (LASIX) 20 MG tablet     methocarbamol (ROBAXIN) 750 MG tablet     metoprolol tartrate (LOPRESSOR) 25 MG tablet     Multiple Vitamin (MULTI-VITAMIN DAILY) TABS     oxyCODONE (ROXICODONE) 5 MG tablet     pantoprazole (PROTONIX) 40 MG EC tablet     polyethylene glycol (MIRALAX) 17 GM/Dose powder     rosuvastatin (CRESTOR) 20 MG tablet     senna-docusate (SENOKOT-S/PERICOLACE) 8.6-50 MG tablet     No current facility-administered medications for this visit.       ALLERGIES:    No Known Allergies    ROS:  Review of symptoms otherwise negative unless commented about in HPI.     LABS:  Last Basic Metabolic Panel:  Lab Results   Component  Value Date     07/04/2023     04/18/2014      Lab Results   Component Value Date    POTASSIUM 3.7 07/04/2023    POTASSIUM 3.4 06/30/2023    POTASSIUM 5.0 04/18/2014     Lab Results   Component Value Date    CHLORIDE 99 07/04/2023    CHLORIDE 104 04/18/2014     Lab Results   Component Value Date    HORACIO 9.0 07/04/2023    HORACIO 9.5 04/18/2014     Lab Results   Component Value Date    CO2 26 07/04/2023    CO2 28 04/18/2014     Lab Results   Component Value Date    BUN 18.2 07/04/2023    BUN 13 04/18/2014     Lab Results   Component Value Date    CR 0.87 07/04/2023    CR 0.88 04/18/2014     Lab Results   Component Value Date     07/04/2023     07/02/2023    GLC 92 05/14/2018       Last CBC:   Lab Results   Component Value Date    WBC 12.1 07/13/2023    WBC 9.4 03/29/2014     Lab Results   Component Value Date    RBC 3.79 07/13/2023    RBC 5.00 03/29/2014     Lab Results   Component Value Date    HGB 10.9 07/13/2023    HGB 15.0 03/29/2014     Lab Results   Component Value Date    HCT 34.2 07/13/2023    HCT 44.9 03/29/2014     No components found for: MCT  Lab Results   Component Value Date    MCV 90 07/13/2023    MCV 90 03/29/2014     Lab Results   Component Value Date    MCH 28.8 07/13/2023    MCH 30.0 03/29/2014     Lab Results   Component Value Date    MCHC 31.9 07/13/2023    MCHC 33.4 03/29/2014     Lab Results   Component Value Date    RDW 12.2 07/13/2023    RDW 13.1 03/29/2014     Lab Results   Component Value Date     07/13/2023     03/29/2014       INR:  Lab Results   Component Value Date    INR 1.31 06/30/2023    INR 1.39 06/30/2023    INR 1.05 06/30/2023    INR 1.01 05/31/2023    INR 1.01 03/29/2014         IMAGING:  None    PHYSICAL EXAM:   Blood Pressure 132/77 (BP Location: Right arm, Patient Position: Chair, Cuff Size: Adult Regular)   Pulse 69   Weight 86 kg (189 lb 9.6 oz)   Oxygen Saturation 98%   Body Mass Index 27.20 kg/m    General: alert and oriented x 3,  pleasant, no acute distress, normal mood and affect  Neuro: Intact with no focal deficits   CV: S1 S2, no murmurs, rubs or gallops, regular rate and rhythm, no peripheral edema  Pulm: bilateral breath sounds, clear to auscultation, easy work of breathing  Incision: incisions clean dry and intact without erythema, swelling or drainage    PROCEDURES: None         REBEKAH Rubio

## 2023-07-21 NOTE — LETTER
"7/21/2023      RE: Jerald Lawrence  [address] [place], [place] [address] [address]       Dear Colleague,    Thank you for the opportunity to participate in the care of your patient, Jerald Lawrence, at the [facility]. Please see a copy of my visit note below.    CARDIOTHORACIC SURGERY FOLLOW-UP VISIT     Jerald Lawrence   1960   [id number]      Reason for visit: Post-Op CABG with Dr. Ferrer on 6/30/2023    ASSESSMENT/PLAN:  Jerald Lawrence is a 63 year old year old male who returns to clinic for postop visit.     Severe multivessel CAD S/P CABGx3  Systolic heart failure with LVEF 43%  Today in clinic patient sounds to be in a regular rhythm with HR <100 bpm. NSR at cardiac rehab.  Discharge weight 199 lbs; weight today 189 lbs; Appears euvolemic upon examinination with no appreciable JVD, BLE edema, abdominal bloating. LS are CTA.  Discussed the importance of nutrition in healing and staying adequately hydrated.   Midline sternal incision healing well with no noted erythema or drainage or signs of infection. Increasing activity and strength overall.    Hemodynamics are stable. Medications reviewed and no changes were needed today.  Follow up with your cardiologist as scheduled.  Continue Outpatient Cardiac Rehab until completed.   Continue sternal precautions for 12 weeks from surgery date.   No driving for 4 weeks from surgery date.     Postoperative restrictions have been reviewed and all of the patient's questions were answered. Our contact information was given to the patient if he should have any further questions/concerns. No further follow up is needed with us.  The total time spent with the patient was 25 minutes, > 50% of which was spent in counseling and coordination of care.    ANAIS Irene, ACNPC-AG, CCRN  Nurse Practitioner  Cardiothoracic Surgery  Pager: [phone]    HPI: Per discharge summary:  \"Jerald Lawrence is a 62 " "year old male who on 6/30/2023 underwent the above-named procedures and tolerated the operation well.      Postoperatively was admitted to the CVICU.  Patient was fast-tract extubated on POD 0.  Blood pressure and cardiac index were managed with vasopressors and inotropic agents which were continuously weaned until no longer needed.        Patient was transiently hyperglycemic and treated with insulin infusion then transitioned to sliding scale insulin per protocol. Blood sugars remained stable such that exogenous insulin was no longer required and no further glycemic control agents will be required at discharge.     Patient was subsequently transferred to the surgical telemetry floor on POD 1     While on the surgical unit, the patient continued to progress well. Chest tubes and temporary pacemaker wires were removed when deemed appropriate.     Patient was fluid overloaded and treated with diuretics. He remains up about 2 lbs from preoperative weight and will discharge with 4 days of diuretic therapy; ongoing need will require re-evaluation during clinic follow-up.      Prior to discharge, his pain was controlled well, he was working well with therapies, able to perform most ADLs, ambulate without difficulty, and had full return of bowel and bladder function.  On 7/4/23 he was discharged home in stable condition. Follow up with Cardiology and Cardiac Surgery have been arranged. Pt encouraged to follow up with PCP and Cardiac Rehab upon discharge.\"    Returns to clinic today for postoperative visit.    Since discharge, has been recovering well  at home.    States pain is well controlled. Needing no medications for pain management. Sleep is improved and getting 7-8 hour stretches. Participating in therapy at cardiac rehab, has been going well. Breathing has been stable/improving. Continues to work on C &DB. Denies SOB at rest, PND, orthopnea. No cough. Had 101 fever the first night home but none since. Patient " denies any fever, chills, chest pain, palpitations, edema, SOB, lightheadedness, nausea and vomiting.  Has had a good and improving appetite. Having regular bowel movements and voiding without difficulty.  Denies  sternal popping or clicking or incisional drainage/erythema. No psychiatric concerns.    PAST MEDICAL HISTORY:  Past Medical History:   Diagnosis Date     Coronary artery disease 6/30/2023     Unspecified part of closed fracture of clavicle     comminuted displaced fx of left clavicle       PAST SURGICAL HISTORY:  Past Surgical History:   Procedure Laterality Date     BYPASS GRAFT ARTERY CORONARY N/A 6/30/2023    Procedure: Median Sternotomy, Cardiopulmonary Byass, Endovascular Greater Saphenous Vein Chauncey Left Leg, Coronary Artery Bypass Grafting x 3, IntraoperativeTransesophageal Echocardiogram (Anesthsia);  Surgeon: Sushant Ferrer MD;  Location: [facility] OR     CV CORONARY ANGIOGRAM N/A 5/31/2023    Procedure: Coronary Angiogram;  Surgeon: Alvaro Acosta MD;  Location:  HEART CARDIAC CATH LAB     CV LEFT HEART CATH N/A 5/31/2023    Procedure: Left Heart Catheterization;  Surgeon: Alvaro Acosta MD;  Location: [facility] CARDIAC CATH LAB     CV LEFT VENTRICULOGRAM N/A 5/31/2023    Procedure: Left Ventriculogram;  Surgeon: Alvaro Acosta MD;  Location: [facility] CARDIAC CATH LAB     CV PCI N/A 5/31/2023    Procedure: Percutaneous Coronary Intervention;  Surgeon: Alvaro Acosta MD;  Location:  HEART CARDIAC CATH LAB       CURRENT MEDICATIONS:   Current Outpatient Medications   Medication     acetaminophen (TYLENOL) 325 MG tablet     aspirin 81 MG EC tablet     furosemide (LASIX) 20 MG tablet     methocarbamol (ROBAXIN) 750 MG tablet     metoprolol tartrate (LOPRESSOR) 25 MG tablet     Multiple Vitamin (MULTI-VITAMIN DAILY) TABS     oxyCODONE (ROXICODONE) 5 MG tablet     pantoprazole (PROTONIX) 40 MG EC tablet     polyethylene glycol (MIRALAX) 17 GM/Dose powder     rosuvastatin  (CRESTOR) 20 MG tablet     senna-docusate (SENOKOT-S/PERICOLACE) 8.6-50 MG tablet     No current facility-administered medications for this visit.       ALLERGIES:    No Known Allergies    ROS:  Review of symptoms otherwise negative unless commented about in HPI.     LABS:  Last Basic Metabolic Panel:  Lab Results   Component Value Date     07/04/2023     04/18/2014      Lab Results   Component Value Date    POTASSIUM 3.7 07/04/2023    POTASSIUM 3.4 06/30/2023    POTASSIUM 5.0 04/18/2014     Lab Results   Component Value Date    CHLORIDE 99 07/04/2023    CHLORIDE 104 04/18/2014     Lab Results   Component Value Date    HORACIO 9.0 07/04/2023    HORACIO 9.5 04/18/2014     Lab Results   Component Value Date    CO2 26 07/04/2023    CO2 28 04/18/2014     Lab Results   Component Value Date    BUN 18.2 07/04/2023    BUN 13 04/18/2014     Lab Results   Component Value Date    CR 0.87 07/04/2023    CR 0.88 04/18/2014     Lab Results   Component Value Date     07/04/2023     07/02/2023    GLC 92 05/14/2018       Last CBC:   Lab Results   Component Value Date    WBC 12.1 07/13/2023    WBC 9.4 03/29/2014     Lab Results   Component Value Date    RBC 3.79 07/13/2023    RBC 5.00 03/29/2014     Lab Results   Component Value Date    HGB 10.9 07/13/2023    HGB 15.0 03/29/2014     Lab Results   Component Value Date    HCT 34.2 07/13/2023    HCT 44.9 03/29/2014     No components found for: MCT  Lab Results   Component Value Date    MCV 90 07/13/2023    MCV 90 03/29/2014     Lab Results   Component Value Date    MCH 28.8 07/13/2023    MCH 30.0 03/29/2014     Lab Results   Component Value Date    MCHC 31.9 07/13/2023    MCHC 33.4 03/29/2014     Lab Results   Component Value Date    RDW 12.2 07/13/2023    RDW 13.1 03/29/2014     Lab Results   Component Value Date     07/13/2023     03/29/2014       INR:  Lab Results   Component Value Date    INR 1.31 06/30/2023    INR 1.39 06/30/2023    INR 1.05  06/30/2023    INR 1.01 05/31/2023    INR 1.01 03/29/2014         IMAGING:  None    PHYSICAL EXAM:   Blood Pressure 132/77 (BP Location: Right arm, Patient Position: Chair, Cuff Size: Adult Regular)   Pulse 69   Weight 86 kg (189 lb 9.6 oz)   Oxygen Saturation 98%   Body Mass Index 27.20 kg/m    General: alert and oriented x 3, pleasant, no acute distress, normal mood and affect  Neuro: Intact with no focal deficits   CV: S1 S2, no murmurs, rubs or gallops, regular rate and rhythm, no peripheral edema  Pulm: bilateral breath sounds, clear to auscultation, easy work of breathing  Incision: incisions clean dry and intact without erythema, swelling or drainage    PROCEDURES: None         CC  Skip Rubio       Please do not hesitate to contact me if you have any questions/concerns.     Sincerely,     Cardiovascular Thoracic Surgery     Pt is a 60 y.o. male ;Pt s/p right Colectomy 3/28/17 ; pt reports h/o colon polyps ; pt states " benign "  pt reports recent colonoscopy ; spring 2019 . pt states polyps were noted ; pt states bx + Colon cancer. Pt states a w/u was continued " It is in my liver " Pt now presents for Mediport Insertion Pt is a 60 y.o. male ;Pt s/p  Robotic  Right Colectomy 3/28/17 .  Pt reports recent colonoscopy ; spring 2019 . pt states polyps were noted ; pt states bx + Colon cancer. Pt states a w/u was continued " It is in my liver " Pt now presents for Mediport Insertion

## 2023-07-24 ENCOUNTER — HOSPITAL ENCOUNTER (OUTPATIENT)
Dept: CARDIAC REHAB | Facility: CLINIC | Age: 63
Discharge: HOME | End: 2023-07-24
Attending: FAMILY MEDICINE
Payer: COMMERCIAL

## 2023-07-24 PROCEDURE — 93798 PHYS/QHP OP CAR RHAB W/ECG: CPT

## 2023-07-26 ENCOUNTER — MYC REFILL (OUTPATIENT)
Dept: FAMILY MEDICINE | Facility: CLINIC | Age: 63
End: 2023-07-26
Payer: COMMERCIAL

## 2023-07-26 ENCOUNTER — HOSPITAL ENCOUNTER (OUTPATIENT)
Dept: CARDIAC REHAB | Facility: CLINIC | Age: 63
Discharge: HOME | End: 2023-07-26
Attending: FAMILY MEDICINE
Payer: COMMERCIAL

## 2023-07-26 DIAGNOSIS — I25.10 CORONARY ARTERY DISEASE INVOLVING NATIVE CORONARY ARTERY OF NATIVE HEART, UNSPECIFIED WHETHER ANGINA PRESENT: ICD-10-CM

## 2023-07-26 PROCEDURE — 93798 PHYS/QHP OP CAR RHAB W/ECG: CPT

## 2023-07-27 RX ORDER — METOPROLOL TARTRATE 25 MG/1
25 TABLET, FILM COATED ORAL 2 TIMES DAILY
Qty: 180 TABLET | Refills: 3 | Status: SHIPPED | OUTPATIENT
Start: 2023-07-27 | End: 2023-12-20 | Stop reason: ALTCHOICE

## 2023-07-27 NOTE — TELEPHONE ENCOUNTER
Pending Prescriptions:                       Disp   Refills    metoprolol tartrate (LOPRESSOR) 25 MG tab*                    Sig: Take 1 tablet (25 mg) by mouth 2 times daily    Routing refill request to provider for review/approval because:  Medication is reported/historical        Gal Simmons RN

## 2023-07-31 ENCOUNTER — HOSPITAL ENCOUNTER (OUTPATIENT)
Dept: CARDIAC REHAB | Facility: CLINIC | Age: 63
Discharge: HOME | End: 2023-07-31
Attending: FAMILY MEDICINE
Payer: COMMERCIAL

## 2023-07-31 PROCEDURE — 93798 PHYS/QHP OP CAR RHAB W/ECG: CPT

## 2023-08-02 ENCOUNTER — HOSPITAL ENCOUNTER (OUTPATIENT)
Dept: CARDIAC REHAB | Facility: CLINIC | Age: 63
Discharge: HOME | End: 2023-08-02
Attending: FAMILY MEDICINE
Payer: COMMERCIAL

## 2023-08-02 PROCEDURE — 93797 PHYS/QHP OP CAR RHAB WO ECG: CPT | Mod: 59

## 2023-08-02 PROCEDURE — 93798 PHYS/QHP OP CAR RHAB W/ECG: CPT

## 2023-08-14 ENCOUNTER — HOSPITAL ENCOUNTER (OUTPATIENT)
Dept: CARDIAC REHAB | Facility: CLINIC | Age: 63
Discharge: HOME | End: 2023-08-14
Attending: FAMILY MEDICINE
Payer: COMMERCIAL

## 2023-08-14 PROCEDURE — 93798 PHYS/QHP OP CAR RHAB W/ECG: CPT

## 2023-08-16 ENCOUNTER — HOSPITAL ENCOUNTER (OUTPATIENT)
Dept: CARDIAC REHAB | Facility: CLINIC | Age: 63
Discharge: HOME | End: 2023-08-16
Attending: FAMILY MEDICINE
Payer: COMMERCIAL

## 2023-08-16 PROCEDURE — 93798 PHYS/QHP OP CAR RHAB W/ECG: CPT

## 2023-08-21 ENCOUNTER — HOSPITAL ENCOUNTER (OUTPATIENT)
Dept: CARDIAC REHAB | Facility: CLINIC | Age: 63
Discharge: HOME | End: 2023-08-21
Attending: FAMILY MEDICINE
Payer: COMMERCIAL

## 2023-08-21 PROCEDURE — 93798 PHYS/QHP OP CAR RHAB W/ECG: CPT

## 2023-08-23 ENCOUNTER — HOSPITAL ENCOUNTER (OUTPATIENT)
Dept: CARDIAC REHAB | Facility: CLINIC | Age: 63
Discharge: HOME | End: 2023-08-23
Attending: FAMILY MEDICINE
Payer: COMMERCIAL

## 2023-08-23 PROCEDURE — 93798 PHYS/QHP OP CAR RHAB W/ECG: CPT

## 2023-08-28 ENCOUNTER — HOSPITAL ENCOUNTER (OUTPATIENT)
Dept: CARDIAC REHAB | Facility: CLINIC | Age: 63
Discharge: HOME | End: 2023-08-28
Attending: FAMILY MEDICINE
Payer: COMMERCIAL

## 2023-08-28 PROCEDURE — 93798 PHYS/QHP OP CAR RHAB W/ECG: CPT

## 2023-08-30 ENCOUNTER — HOSPITAL ENCOUNTER (OUTPATIENT)
Dept: CARDIAC REHAB | Facility: CLINIC | Age: 63
Discharge: HOME | End: 2023-08-30
Attending: FAMILY MEDICINE
Payer: COMMERCIAL

## 2023-08-30 PROCEDURE — 93798 PHYS/QHP OP CAR RHAB W/ECG: CPT

## 2023-09-06 ENCOUNTER — HOSPITAL ENCOUNTER (OUTPATIENT)
Dept: CARDIAC REHAB | Facility: CLINIC | Age: 63
Discharge: HOME | End: 2023-09-06
Attending: FAMILY MEDICINE
Payer: COMMERCIAL

## 2023-09-06 PROCEDURE — 93798 PHYS/QHP OP CAR RHAB W/ECG: CPT

## 2023-09-11 ENCOUNTER — HOSPITAL ENCOUNTER (OUTPATIENT)
Dept: CARDIAC REHAB | Facility: CLINIC | Age: 63
Discharge: HOME | End: 2023-09-11
Attending: FAMILY MEDICINE
Payer: COMMERCIAL

## 2023-09-11 PROCEDURE — 93798 PHYS/QHP OP CAR RHAB W/ECG: CPT

## 2023-09-13 ENCOUNTER — HOSPITAL ENCOUNTER (OUTPATIENT)
Dept: CARDIAC REHAB | Facility: CLINIC | Age: 63
Discharge: HOME | End: 2023-09-13
Attending: FAMILY MEDICINE
Payer: COMMERCIAL

## 2023-09-13 PROCEDURE — 93798 PHYS/QHP OP CAR RHAB W/ECG: CPT

## 2023-09-14 ENCOUNTER — OFFICE VISIT (OUTPATIENT)
Dept: CARDIOLOGY | Facility: CLINIC | Age: 63
End: 2023-09-14
Payer: COMMERCIAL

## 2023-09-14 VITALS
DIASTOLIC BLOOD PRESSURE: 69 MMHG | WEIGHT: 191 LBS | OXYGEN SATURATION: 97 % | HEART RATE: 60 BPM | SYSTOLIC BLOOD PRESSURE: 104 MMHG | BODY MASS INDEX: 27.41 KG/M2

## 2023-09-14 DIAGNOSIS — I25.10 CORONARY ARTERY DISEASE INVOLVING NATIVE HEART WITHOUT ANGINA PECTORIS, UNSPECIFIED VESSEL OR LESION TYPE: Primary | ICD-10-CM

## 2023-09-14 PROCEDURE — 99214 OFFICE O/P EST MOD 30 MIN: CPT | Performed by: INTERNAL MEDICINE

## 2023-09-14 NOTE — PROGRESS NOTES
CARDIOLOGY CLINIC CONSULTATION    PRIMARY CARE PHYSICIAN:  Skip Rubio    HISTORY OF PRESENT ILLNESS:  This is a very pleasant 63-year-old gentleman who is here with his wife Whitley.  The patient saw me in May 2023 with a high risk positive stress test and LV dysfunction.  He had typical symptoms of angina.  He was referred for coronary angiography that revealed multivessel coronary artery disease.  He was referred for bypass surgery.  Subsequently he underwent CABG x3 (left internal mammary artery to left anterior descending artery, saphenous vein graft to posterior descending artery, saphenous vein graft to first obtuse marginal artery) on June 30, 2023.  Postoperatively he was mildly fluid overloaded and had to be treated with diuretics but none required after discharge.    Today is here for routine follow-up.  Denies any cardiovascular symptoms.  Sternal incision is healed well.  He does not smoke.  He has not been checking his blood pressure at home but no cardiac symptoms reported.  He is functionally active.  No syncope presyncope heart failure or angina reported.    PAST MEDICAL HISTORY:  Past Medical History:   Diagnosis Date    Coronary artery disease 6/30/2023    Unspecified part of closed fracture of clavicle     comminuted displaced fx of left clavicle       MEDICATIONS:  Current Outpatient Medications   Medication    acetaminophen (TYLENOL) 325 MG tablet    aspirin 81 MG EC tablet    metoprolol tartrate (LOPRESSOR) 25 MG tablet    Multiple Vitamin (MULTI-VITAMIN DAILY) TABS    rosuvastatin (CRESTOR) 20 MG tablet     No current facility-administered medications for this visit.       SOCIAL HISTORY:  I have reviewed this patient's social history and updated it with pertinent information if needed. Jerald Lawrence  reports that he quit smoking about 8 months ago. His smoking use included cigarettes. He started smoking about 45 years ago. He has a 26.25 pack-year smoking history. He has never been  exposed to tobacco smoke. He quit smokeless tobacco use about 41 years ago.  His smokeless tobacco use included chew. He reports current alcohol use. He reports that he does not use drugs.    PHYSICAL EXAM:  Pulse:  [60] 60  BP: (104)/(69) 104/69  SpO2:  [97 %] 97 %  191 lbs 0 oz    Constitutional: alert, no distress  Respiratory: Good bilateral air entry  Cardiovascular: Normal regular heart sounds no edema  GI: nondistended  Neuropsychiatric: appropriate affact    ASSESSMENT: Pertinent issues addressed/ reviewed during this cardiology visit  Coronary artery disease status post bypass surgery June 2023  LV dysfunction EF 43% ischemic cardiomyopathy  Mild postoperative anemia  History of smoking in the past and hyperlipidemia    RECOMMENDATIONS:  The patient is doing well from a cardiac standpoint without symptoms.  In regards to CAD, continue aspirin statin therapy lifelong.  LDL goal of 50-70.  No smoking.  He is currently without angina.  In regards to LV dysfunction, currently he is on metoprolol tartrate 25 twice daily.  No diuretic requirements.  Blood pressure is low normal.  No cardiac symptoms at this time.  Recommend getting an echocardiogram to assess EF.  If he still has persistent LV dysfunction, would recommend addition of low-dose Entresto as tolerated.  He wants to change his metoprolol to once a day regimen.  I am happy to change him to metoprolol succinate but will decide on dosing depending on echocardiography depending on his needs for other neurohormonal blockade.    Follow-up with an SANCHO in 3 months.  See me back in 6 months and after that we will space out visits.  No smoking healthy diet exercise recommended.    It was a pleasure seeing this patient in clinic today. Please do not hesitate to contact me with any future questions.     TYRELL Choi, [facility]  Cardiology - [facility] Heart  September 14, 2023    Review of the result(s) of each unique test - Last echo ECG stress test coronary angiogram  lipids     The level of medical decision making during this visit was of moderate complexity.    This note was completed in part using dictation via the Dragon voice recognition software. Some word and grammatical errors may occur and must be interpreted in the appropriate clinical context.  If there are any questions pertaining to this issue, please contact me for further clarification.

## 2023-09-14 NOTE — LETTER
9/14/2023    Skip Rubio MD  [address] [facility] [address]    RE: Jerald Lawrence       Dear Colleague,     I had the pleasure of seeing Jerald Lawrence in the [facility].  CARDIOLOGY CLINIC CONSULTATION    PRIMARY CARE PHYSICIAN:  Skip Rubio    HISTORY OF PRESENT ILLNESS:  This is a very pleasant 63-year-old gentleman who is here with his wife Whitley.  The patient saw me in May 2023 with a high risk positive stress test and LV dysfunction.  He had typical symptoms of angina.  He was referred for coronary angiography that revealed multivessel coronary artery disease.  He was referred for bypass surgery.  Subsequently he underwent CABG x3 (left internal mammary artery to left anterior descending artery, saphenous vein graft to posterior descending artery, saphenous vein graft to first obtuse marginal artery) on June 30, 2023.  Postoperatively he was mildly fluid overloaded and had to be treated with diuretics but none required after discharge.    Today is here for routine follow-up.  Denies any cardiovascular symptoms.  Sternal incision is healed well.  He does not smoke.  He has not been checking his blood pressure at home but no cardiac symptoms reported.  He is functionally active.  No syncope presyncope heart failure or angina reported.    PAST MEDICAL HISTORY:  Past Medical History:   Diagnosis Date    Coronary artery disease 6/30/2023    Unspecified part of closed fracture of clavicle     comminuted displaced fx of left clavicle       MEDICATIONS:  Current Outpatient Medications   Medication    acetaminophen (TYLENOL) 325 MG tablet    aspirin 81 MG EC tablet    metoprolol tartrate (LOPRESSOR) 25 MG tablet    Multiple Vitamin (MULTI-VITAMIN DAILY) TABS    rosuvastatin (CRESTOR) 20 MG tablet     No current facility-administered medications for this visit.       SOCIAL HISTORY:  I have reviewed this patient's social history and updated it with pertinent  information if needed. Jerald Lawrence  reports that he quit smoking about 8 months ago. His smoking use included cigarettes. He started smoking about 45 years ago. He has a 26.25 pack-year smoking history. He has never been exposed to tobacco smoke. He quit smokeless tobacco use about 41 years ago.  His smokeless tobacco use included chew. He reports current alcohol use. He reports that he does not use drugs.    PHYSICAL EXAM:  Pulse:  [60] 60  BP: (104)/(69) 104/69  SpO2:  [97 %] 97 %  191 lbs 0 oz    Constitutional: alert, no distress  Respiratory: Good bilateral air entry  Cardiovascular: Normal regular heart sounds no edema  GI: nondistended  Neuropsychiatric: appropriate affact    ASSESSMENT: Pertinent issues addressed/ reviewed during this cardiology visit  Coronary artery disease status post bypass surgery June 2023  LV dysfunction EF 43% ischemic cardiomyopathy  Mild postoperative anemia  History of smoking in the past and hyperlipidemia    RECOMMENDATIONS:  The patient is doing well from a cardiac standpoint without symptoms.  In regards to CAD, continue aspirin statin therapy lifelong.  LDL goal of 50-70.  No smoking.  He is currently without angina.  In regards to LV dysfunction, currently he is on metoprolol tartrate 25 twice daily.  No diuretic requirements.  Blood pressure is low normal.  No cardiac symptoms at this time.  Recommend getting an echocardiogram to assess EF.  If he still has persistent LV dysfunction, would recommend addition of low-dose Entresto as tolerated.  He wants to change his metoprolol to once a day regimen.  I am happy to change him to metoprolol succinate but will decide on dosing depending on echocardiography depending on his needs for other neurohormonal blockade.    Follow-up with an [facility]CHO in 3 months.  See me back in 6 months and after that we will space out visits.  No smoking healthy diet exercise recommended.    It was a pleasure seeing this patient in clinic today. Please  do not hesitate to contact me with any future questions.     TYRELL Choi, [facility]  Cardiology - [facility] Heart  September 14, 2023    Review of the result(s) of each unique test - Last echo ECG stress test coronary angiogram lipids     The level of medical decision making during this visit was of moderate complexity.    This note was completed in part using dictation via the Dragon voice recognition software. Some word and grammatical errors may occur and must be interpreted in the appropriate clinical context.  If there are any questions pertaining to this issue, please contact me for further clarification.      Thank you for allowing me to participate in the care of your patient.      Sincerely,     Flavia Ibarra MD     [facility]  cc:   No referring provider defined for this encounter.

## 2023-09-18 ENCOUNTER — HOSPITAL ENCOUNTER (OUTPATIENT)
Dept: CARDIAC REHAB | Facility: CLINIC | Age: 63
Discharge: HOME | End: 2023-09-18
Attending: FAMILY MEDICINE
Payer: COMMERCIAL

## 2023-09-18 ENCOUNTER — LAB (OUTPATIENT)
Dept: LAB | Facility: CLINIC | Age: 63
End: 2023-09-18
Payer: COMMERCIAL

## 2023-09-18 DIAGNOSIS — D62 ANEMIA DUE TO BLOOD LOSS, ACUTE: ICD-10-CM

## 2023-09-18 DIAGNOSIS — D75.839 THROMBOCYTOSIS: ICD-10-CM

## 2023-09-18 DIAGNOSIS — I25.10 CORONARY ARTERY DISEASE INVOLVING NATIVE HEART WITHOUT ANGINA PECTORIS, UNSPECIFIED VESSEL OR LESION TYPE: ICD-10-CM

## 2023-09-18 LAB
ALBUMIN SERPL BCG-MCNC: 4.4 G/DL (ref 3.5–5.2)
ALP SERPL-CCNC: 84 U/L (ref 40–129)
ALT SERPL W P-5'-P-CCNC: 18 U/L (ref 0–70)
ANION GAP SERPL CALCULATED.3IONS-SCNC: 8 MMOL/L (ref 7–15)
AST SERPL W P-5'-P-CCNC: 24 U/L (ref 0–45)
BILIRUB SERPL-MCNC: 0.3 MG/DL
BUN SERPL-MCNC: 12.8 MG/DL (ref 8–23)
CALCIUM SERPL-MCNC: 9.7 MG/DL (ref 8.8–10.2)
CHLORIDE SERPL-SCNC: 106 MMOL/L (ref 98–107)
CHOLEST SERPL-MCNC: 158 MG/DL
CREAT SERPL-MCNC: 0.86 MG/DL (ref 0.67–1.17)
DEPRECATED HCO3 PLAS-SCNC: 28 MMOL/L (ref 22–29)
EGFRCR SERPLBLD CKD-EPI 2021: >90 ML/MIN/1.73M2
ERYTHROCYTE [DISTWIDTH] IN BLOOD BY AUTOMATED COUNT: 13.4 % (ref 10–15)
GLUCOSE SERPL-MCNC: 104 MG/DL (ref 70–99)
HCT VFR BLD AUTO: 42 % (ref 40–53)
HDLC SERPL-MCNC: 56 MG/DL
HGB BLD-MCNC: 13 G/DL (ref 13.3–17.7)
LDLC SERPL CALC-MCNC: 88 MG/DL
MCH RBC QN AUTO: 26.4 PG (ref 26.5–33)
MCHC RBC AUTO-ENTMCNC: 31 G/DL (ref 31.5–36.5)
MCV RBC AUTO: 85 FL (ref 78–100)
NONHDLC SERPL-MCNC: 102 MG/DL
PLATELET # BLD AUTO: 314 10E3/UL (ref 150–450)
POTASSIUM SERPL-SCNC: 4.5 MMOL/L (ref 3.4–5.3)
PROT SERPL-MCNC: 7 G/DL (ref 6.4–8.3)
RBC # BLD AUTO: 4.92 10E6/UL (ref 4.4–5.9)
SODIUM SERPL-SCNC: 142 MMOL/L (ref 136–145)
TRIGL SERPL-MCNC: 70 MG/DL
WBC # BLD AUTO: 7.6 10E3/UL (ref 4–11)

## 2023-09-18 PROCEDURE — 80061 LIPID PANEL: CPT | Performed by: INTERNAL MEDICINE

## 2023-09-18 PROCEDURE — 85027 COMPLETE CBC AUTOMATED: CPT

## 2023-09-18 PROCEDURE — 93798 PHYS/QHP OP CAR RHAB W/ECG: CPT | Performed by: CLINICAL EXERCISE PHYSIOLOGIST

## 2023-09-18 PROCEDURE — 36415 COLL VENOUS BLD VENIPUNCTURE: CPT

## 2023-09-18 PROCEDURE — 80053 COMPREHEN METABOLIC PANEL: CPT | Performed by: INTERNAL MEDICINE

## 2023-09-18 NOTE — RESULT ENCOUNTER NOTE
Electrolytes, kidney and liver functions WNL; Lipids much improved but not quite to goal (pt is s/p CABG). Taking rosuvastatin 20 mg every day. Hgb slightly low but improving. Will discuss with Dr Ibarra to see if he would like to increase rosuvastatin.

## 2023-09-19 DIAGNOSIS — D62 ANEMIA DUE TO BLOOD LOSS, ACUTE: Primary | ICD-10-CM

## 2023-09-25 ENCOUNTER — HOSPITAL ENCOUNTER (OUTPATIENT)
Dept: CARDIOLOGY | Facility: CLINIC | Age: 63
Discharge: HOME | End: 2023-09-25
Attending: INTERNAL MEDICINE | Admitting: INTERNAL MEDICINE
Payer: COMMERCIAL

## 2023-09-25 DIAGNOSIS — I25.10 CORONARY ARTERY DISEASE INVOLVING NATIVE HEART WITHOUT ANGINA PECTORIS, UNSPECIFIED VESSEL OR LESION TYPE: ICD-10-CM

## 2023-09-25 LAB — LVEF ECHO: NORMAL

## 2023-09-25 PROCEDURE — 93306 TTE W/DOPPLER COMPLETE: CPT | Mod: 26 | Performed by: INTERNAL MEDICINE

## 2023-09-25 PROCEDURE — 93306 TTE W/DOPPLER COMPLETE: CPT

## 2023-10-11 ENCOUNTER — TELEPHONE (OUTPATIENT)
Dept: FAMILY MEDICINE | Facility: CLINIC | Age: 63
End: 2023-10-11
Payer: COMMERCIAL

## 2023-10-11 NOTE — LETTER
October 11, 2023      Jerald Lawrence  [address] [place], [place] [address] [address]        Dear Jerald,     Your team at [facility] cares about your health. We have reviewed your chart and based on our findings; we are making the following recommendations to better manage your health.     You are in particular need of attention regarding the following:   Colon cancer screening    Call or MyChart message your clinic to schedule a colonoscopy, schedule/ a FIT Test, or order a Cologuard test. If you are unsure what type of test you need, please call your clinic and speak to clinic staff.   Colon cancer is now the second leading cause of cancer-related deaths in the [facility] for both men and women and there are over 130,000 new cases and 50,000 deaths per year from colon cancer. Colonoscopies can prevent 90-95% of these deaths. Problem lesions can be removed before they ever become cancer. This test is not only looking for cancer, but also getting rid of precancerous lesions.   If you are under/uninsured, we recommend you contact the [employer] Program.[employer] is a free colorectal cancer screening program that provides colonoscopies for eligible under/uninsured [place] men and women. If you are interested in receiving a free colonoscopy, please call [employer] at t [phone] (mention code [employer]) to see if you're eligible. Please have them send us the results.     If you have already completed these items, please contact the clinic via phone or   [employer] so your care team can review and update your records. Thank you for   choosing [facility] for your healthcare needs. For any questions,   concerns, or to schedule an appointment please contact our clinic.    Healthy Regards,      Your [facility] Team

## 2023-10-11 NOTE — TELEPHONE ENCOUNTER
Patient Quality Outreach    Patient is due for the following:   Colon Cancer Screening    Next Steps:   Schedule colonoscopy    Type of outreach:    Sent letter.      Questions for provider review:    None           Minoo Manning, CMA

## 2023-12-20 ENCOUNTER — OFFICE VISIT (OUTPATIENT)
Dept: CARDIOLOGY | Facility: CLINIC | Age: 63
End: 2023-12-20
Attending: INTERNAL MEDICINE
Payer: COMMERCIAL

## 2023-12-20 VITALS
HEIGHT: 70 IN | SYSTOLIC BLOOD PRESSURE: 121 MMHG | HEART RATE: 68 BPM | BODY MASS INDEX: 28.32 KG/M2 | DIASTOLIC BLOOD PRESSURE: 79 MMHG | WEIGHT: 197.8 LBS | OXYGEN SATURATION: 99 % | RESPIRATION RATE: 16 BRPM

## 2023-12-20 DIAGNOSIS — E78.5 HYPERLIPIDEMIA LDL GOAL <70: ICD-10-CM

## 2023-12-20 DIAGNOSIS — I25.810 CORONARY ARTERY DISEASE INVOLVING CORONARY BYPASS GRAFT OF NATIVE HEART WITHOUT ANGINA PECTORIS: Primary | ICD-10-CM

## 2023-12-20 DIAGNOSIS — Z86.79 HISTORY OF ISCHEMIC CARDIOMYOPATHY: ICD-10-CM

## 2023-12-20 PROCEDURE — 99214 OFFICE O/P EST MOD 30 MIN: CPT | Performed by: NURSE PRACTITIONER

## 2023-12-20 RX ORDER — ROSUVASTATIN CALCIUM 40 MG/1
40 TABLET, COATED ORAL DAILY
Qty: 30 TABLET | Refills: 11 | Status: SHIPPED | OUTPATIENT
Start: 2023-12-20 | End: 2024-07-02

## 2023-12-20 RX ORDER — ASPIRIN 81 MG/1
81 TABLET ORAL DAILY
COMMUNITY
Start: 2023-12-20

## 2023-12-20 RX ORDER — METOPROLOL SUCCINATE 50 MG/1
50 TABLET, EXTENDED RELEASE ORAL DAILY
Qty: 90 TABLET | Refills: 3 | Status: SHIPPED | OUTPATIENT
Start: 2023-12-20

## 2023-12-20 NOTE — PATIENT INSTRUCTIONS
Medication Changes:  Increase rosuvastatin to 40 mg daily  Stop metoprolol tartrate   Start metoprolol succinate XL 50 mg daily   Decrease aspirin to 81 mg daily     Recommendations:  Check blood pressure at least 1 hour after medications. Call the clinic if your blood pressure is consistently greater than 130/80.   Check daily weights and call the clinic if your weight has increased more than 2 lbs in one day or 5 lbs in one week; if you feel more short of breath or have worsening swelling in your legs or abdomen.    Follow-up:  Cardiology follow up at [facility]: Dr. Ibarra in 6 months  Fasting lab in 2 months (lipid/ALT).     Cardiology Scheduling~[phone]  Cardiology Clinic RN~[phone] (Meseret RN, Kelly RN)

## 2023-12-20 NOTE — LETTER
12/20/2023    Skip Rubio MD  [address] [facility] [address]    RE: Jerald Lawrence       Dear Colleague,     I had the pleasure of seeing Jerald Lawrence in the [facility].  Cardiology Clinic Progress Note  Jerald Lawrence MRN# [id number]   YOB: 1960 Age: 63 year old      Primary Cardiologist:   Dr. Ibarra  Patient presents today for 3-month follow-up        History of Presenting Illness:      Jerald Lawrence is a pleasant 63 year old patient with a past cardiac history significant for   Coronary artery disease  with CABG  Angina ->positive stress test and cardiomyopathy  CABG x 3 (LIMA to LAD, SVG to PDA, SVG to OM) 6/2023  History of ischemic cardiomyopathy  EF 45% prior to CABG  Normalized 9/2023 after CABG  Hyperlipidemia   Past medical history significant for prior tobacco use.      Patient was seen by Dr. Ibarra in September 2023 for hospital follow-up and was doing well.    Most recent lipid profile, BMP, ALT, hemoglobin reviewed today.  Blood pressure is controlled.  He would like to switch to metoprolol XL.  Weight is up 6 pounds in last 3 months and patient attributes this to caloric intake.  He denies any anginal symptoms and regularly walks 3 miles in the mornings.  He denies any heart failure symptoms.  He is agreeable to uptitrating rosuvastatin.  He reports increased ecchymosis on aspirin 162 mg daily.  Given he is more than 3 months postop CABG he can decrease down to 81 mg daily. Patient reports no chest pain, shortness of breath, PND, orthopnea, presyncope, syncope, edema, heart racing, or palpitations.                  Assessment and Plan:       Plan  Patient Instructions   Medication Changes:  Increase rosuvastatin to 40 mg daily  Stop metoprolol tartrate   Start metoprolol succinate XL 50 mg daily   Decrease aspirin to 81 mg daily     Recommendations:  Check blood pressure at least 1 hour after medications. Call the clinic if your blood  pressure is consistently greater than 130/80.   Check daily weights and call the clinic if your weight has increased more than 2 lbs in one day or 5 lbs in one week; if you feel more short of breath or have worsening swelling in your legs or abdomen.    Follow-up:  Cardiology follow up at [facility]: Dr. Ibarra in 6 months  Fasting lab in 2 months (lipid/ALT).     Cardiology Scheduling~[phone]  Cardiology Clinic RN~[phone] (Meseret RN, Kelly RN)          Coronary artery disease involving coronary bypass graft of native heart without angina pectoris  No angina  Continue GDMT    History of ischemic cardiomyopathy  No symptoms of heart failure  Continue beta-blocker    Hyperlipidemia LDL goal <55  LDL not at goal  Increase rosuvastatin              Respiratory:  clear to auscultation; normal symmetry        Cardiac: regular rate and rhythm     GI:  abdomen nondistended     Extremities and Muscular Skeletal:   no edema            Thank you for allowing me to participate in this delightful patient's care.   This note was completed in part using Dragon voice recognition software. Although reviewed after completion, some word and grammatical errors may occur.    ANAIS López CNP    Thank you for allowing me to participate in the care of your patient.      Sincerely,     ANAIS López CNP     [facility]  cc:   Flavia Ibarra MD  [address]  [place] [address]

## 2023-12-20 NOTE — PROGRESS NOTES
Cardiology Clinic Progress Note  Jerald Lawrence MRN# [id number]   YOB: 1960 Age: 63 year old      Primary Cardiologist:   Dr. Ibarra  Patient presents today for 3-month follow-up        History of Presenting Illness:      Jerald Lawrence is a pleasant 63 year old patient with a past cardiac history significant for   Coronary artery disease  with CABG  Angina ->positive stress test and cardiomyopathy  CABG x 3 (LIMA to LAD, SVG to PDA, SVG to OM) 6/2023  History of ischemic cardiomyopathy  EF 45% prior to CABG  Normalized 9/2023 after CABG  Hyperlipidemia   Past medical history significant for prior tobacco use.      Patient was seen by Dr. Ibarra in September 2023 for hospital follow-up and was doing well.    Most recent lipid profile, BMP, ALT, hemoglobin reviewed today.  Blood pressure is controlled.  He would like to switch to metoprolol XL.  Weight is up 6 pounds in last 3 months and patient attributes this to caloric intake.  He denies any anginal symptoms and regularly walks 3 miles in the mornings.  He denies any heart failure symptoms.  He is agreeable to uptitrating rosuvastatin.  He reports increased ecchymosis on aspirin 162 mg daily.  Given he is more than 3 months postop CABG he can decrease down to 81 mg daily. Patient reports no chest pain, shortness of breath, PND, orthopnea, presyncope, syncope, edema, heart racing, or palpitations.                  Assessment and Plan:       Plan  Patient Instructions   Medication Changes:  Increase rosuvastatin to 40 mg daily  Stop metoprolol tartrate   Start metoprolol succinate XL 50 mg daily   Decrease aspirin to 81 mg daily     Recommendations:  Check blood pressure at least 1 hour after medications. Call the clinic if your blood pressure is consistently greater than 130/80.   Check daily weights and call the clinic if your weight has increased more than 2 lbs in one day or 5 lbs in one week; if you feel more short of breath or have worsening  swelling in your legs or abdomen.    Follow-up:  Cardiology follow up at [facility]: Dr. Ibarra in 6 months  Fasting lab in 2 months (lipid/ALT).     Cardiology Scheduling~[phone]  Cardiology Clinic RN~[phone] (Meseret RN, Kelly RN)          Coronary artery disease involving coronary bypass graft of native heart without angina pectoris  No angina  Continue GDMT    History of ischemic cardiomyopathy  No symptoms of heart failure  Continue beta-blocker    Hyperlipidemia LDL goal <55  LDL not at goal  Increase rosuvastatin              Respiratory:  clear to auscultation; normal symmetry        Cardiac: regular rate and rhythm     GI:  abdomen nondistended     Extremities and Muscular Skeletal:   no edema            Thank you for allowing me to participate in this delightful patient's care.   This note was completed in part using Dragon voice recognition software. Although reviewed after completion, some word and grammatical errors may occur.    Marian Hernandez, ANAIS CNP

## 2024-02-20 ENCOUNTER — LAB (OUTPATIENT)
Dept: LAB | Facility: CLINIC | Age: 64
End: 2024-02-20
Payer: COMMERCIAL

## 2024-02-20 DIAGNOSIS — E78.5 HYPERLIPIDEMIA LDL GOAL <70: ICD-10-CM

## 2024-02-20 DIAGNOSIS — D62 ANEMIA DUE TO BLOOD LOSS, ACUTE: ICD-10-CM

## 2024-02-20 LAB
ALT SERPL W P-5'-P-CCNC: 28 U/L (ref 0–70)
CHOLEST SERPL-MCNC: 167 MG/DL
FASTING STATUS PATIENT QL REPORTED: YES
HDLC SERPL-MCNC: 64 MG/DL
HGB BLD-MCNC: 14.4 G/DL (ref 13.3–17.7)
LDLC SERPL CALC-MCNC: 88 MG/DL
NONHDLC SERPL-MCNC: 103 MG/DL
TRIGL SERPL-MCNC: 77 MG/DL

## 2024-02-20 PROCEDURE — 85018 HEMOGLOBIN: CPT

## 2024-02-20 PROCEDURE — 36415 COLL VENOUS BLD VENIPUNCTURE: CPT | Performed by: NURSE PRACTITIONER

## 2024-02-20 PROCEDURE — 80061 LIPID PANEL: CPT | Performed by: NURSE PRACTITIONER

## 2024-02-20 PROCEDURE — 84460 ALANINE AMINO (ALT) (SGPT): CPT | Performed by: NURSE PRACTITIONER

## 2024-02-22 ENCOUNTER — MYC MEDICAL ADVICE (OUTPATIENT)
Dept: CARDIOLOGY | Facility: CLINIC | Age: 64
End: 2024-02-22
Payer: COMMERCIAL

## 2024-02-22 DIAGNOSIS — E78.5 HYPERLIPIDEMIA LDL GOAL <70: Primary | ICD-10-CM

## 2024-02-22 RX ORDER — EZETIMIBE 10 MG/1
10 TABLET ORAL DAILY
Qty: 30 TABLET | Refills: 3 | Status: SHIPPED | OUTPATIENT
Start: 2024-02-22 | End: 2024-06-18

## 2024-03-04 ENCOUNTER — TELEPHONE (OUTPATIENT)
Dept: FAMILY MEDICINE | Facility: CLINIC | Age: 64
End: 2024-03-04
Payer: COMMERCIAL

## 2024-03-04 NOTE — TELEPHONE ENCOUNTER
Patient Quality Outreach    Patient is due for the following:   Colon Cancer Screening    Next Steps:   Pt needs to schedule a visit to get a colon cancer screening referral.      Type of outreach:    Sent [employer] message.      Questions for provider review:    None           Quyen Palmer MA

## 2024-03-27 ENCOUNTER — TRANSFERRED RECORDS (OUTPATIENT)
Dept: HEALTH INFORMATION MANAGEMENT | Facility: CLINIC | Age: 64
End: 2024-03-27
Payer: COMMERCIAL

## 2024-04-01 ENCOUNTER — PATIENT OUTREACH (OUTPATIENT)
Dept: GASTROENTEROLOGY | Facility: CLINIC | Age: 64
End: 2024-04-01
Payer: COMMERCIAL

## 2024-04-23 ENCOUNTER — LAB (OUTPATIENT)
Dept: LAB | Facility: CLINIC | Age: 64
End: 2024-04-23
Payer: COMMERCIAL

## 2024-04-23 DIAGNOSIS — E78.5 HYPERLIPIDEMIA LDL GOAL <70: ICD-10-CM

## 2024-04-23 LAB
CHOLEST SERPL-MCNC: 125 MG/DL
FASTING STATUS PATIENT QL REPORTED: YES
HDLC SERPL-MCNC: 52 MG/DL
LDLC SERPL CALC-MCNC: 66 MG/DL
NONHDLC SERPL-MCNC: 73 MG/DL
TRIGL SERPL-MCNC: 37 MG/DL

## 2024-04-23 PROCEDURE — 80061 LIPID PANEL: CPT | Performed by: NURSE PRACTITIONER

## 2024-04-23 PROCEDURE — 36415 COLL VENOUS BLD VENIPUNCTURE: CPT | Performed by: NURSE PRACTITIONER

## 2024-04-24 DIAGNOSIS — I25.10 CORONARY ARTERY DISEASE INVOLVING NATIVE HEART, UNSPECIFIED VESSEL OR LESION TYPE, UNSPECIFIED WHETHER ANGINA PRESENT: ICD-10-CM

## 2024-04-24 DIAGNOSIS — E78.5 HYPERLIPIDEMIA LDL GOAL <70: Primary | ICD-10-CM

## 2024-06-05 ENCOUNTER — OFFICE VISIT (OUTPATIENT)
Dept: CARDIOLOGY | Facility: CLINIC | Age: 64
End: 2024-06-05
Attending: NURSE PRACTITIONER
Payer: COMMERCIAL

## 2024-06-05 VITALS
HEIGHT: 70 IN | BODY MASS INDEX: 28.98 KG/M2 | DIASTOLIC BLOOD PRESSURE: 86 MMHG | HEART RATE: 72 BPM | WEIGHT: 202.4 LBS | SYSTOLIC BLOOD PRESSURE: 132 MMHG | RESPIRATION RATE: 14 BRPM | OXYGEN SATURATION: 97 %

## 2024-06-05 DIAGNOSIS — Z86.79 HISTORY OF ISCHEMIC CARDIOMYOPATHY: ICD-10-CM

## 2024-06-05 DIAGNOSIS — E78.5 HYPERLIPIDEMIA LDL GOAL <70: ICD-10-CM

## 2024-06-05 DIAGNOSIS — I25.810 CORONARY ARTERY DISEASE INVOLVING CORONARY BYPASS GRAFT OF NATIVE HEART WITHOUT ANGINA PECTORIS: ICD-10-CM

## 2024-06-05 PROCEDURE — 99214 OFFICE O/P EST MOD 30 MIN: CPT | Performed by: INTERNAL MEDICINE

## 2024-06-05 ASSESSMENT — PAIN SCALES - GENERAL: PAINLEVEL: NO PAIN (0)

## 2024-06-05 NOTE — LETTER
6/5/2024    Skip Rubio MD  [address] [facility] [address]    RE: Jerald Lawrence       Dear Colleague,     I had the pleasure of seeing Jerald Lawrence in the [facility].  CARDIOLOGY CLINIC CONSULTATION    PRIMARY CARE PHYSICIAN:  Skip Rubio    HISTORY OF PRESENT ILLNESS:  This is a very pleasant 63-year-old gentleman who is here in follow up.      The patient saw me in May 2023 with a high risk positive stress test and LV dysfunction & typical symptoms of angina.  He was referred for coronary angiography that revealed multivessel coronary artery disease. Subsequently he underwent CABG x3 (left internal mammary artery to left anterior descending artery, saphenous vein graft to posterior descending artery, saphenous vein graft to first obtuse marginal artery) on June 30, 2023.      He had mild ischemic cardiomyopathy with EF of around 40 to 45% which subsequently normalized and of 2023.     Today is here for routine follow-up.  Denies any cardiovascular symptoms.  Sternal incision is healed well.  He does not smoke.  No syncope presyncope heart failure or angina reported.  He says he easily walks about 4 miles every day.  He is compliant with his medications.  LDL is at goal.  Last LDL was 66.    PAST MEDICAL HISTORY:  Past Medical History:   Diagnosis Date    Coronary artery disease 6/30/2023    Unspecified part of closed fracture of clavicle     comminuted displaced fx of left clavicle       MEDICATIONS:  Current Outpatient Medications   Medication Sig Dispense Refill    acetaminophen (TYLENOL) 325 MG tablet Take 2 tablets (650 mg) by mouth every 4 hours as needed for other (For optimal non-opioid multimodal pain management to improve pain control.)      aspirin 81 MG EC tablet Take 1 tablet (81 mg) by mouth daily      ezetimibe (ZETIA) 10 MG tablet Take 1 tablet (10 mg) by mouth daily 30 tablet 3    metoprolol succinate ER (TOPROL XL) 50 MG 24 hr tablet Take 1  tablet (50 mg) by mouth daily 90 tablet 3    Multiple Vitamin (MULTI-VITAMIN DAILY) TABS Take 1 tablet by mouth daily      rosuvastatin (CRESTOR) 40 MG tablet Take 1 tablet (40 mg) by mouth daily 30 tablet 11     No current facility-administered medications for this visit.       SOCIAL HISTORY:  I have reviewed this patient's social history and updated it with pertinent information if needed. Jerald Lawrence  reports that he quit smoking about 17 months ago. His smoking use included cigarettes. He started smoking about 46 years ago. He has a 33.8 pack-year smoking history. He has never been exposed to tobacco smoke. He quit smokeless tobacco use about 42 years ago.  His smokeless tobacco use included chew. He reports current alcohol use. He reports that he does not use drugs.    PHYSICAL EXAM:  Pulse:  [72] 72  Resp:  [14] 14  BP: (132)/(86) 132/86  SpO2:  [97 %] 97 %  202 lbs 6.4 oz    Constitutional: alert, no distress  Respiratory: Good bilateral air entry  Cardiovascular: Normal regular heart sounds no murmur rubs or gallops no edema  GI: nondistended  Neuropsychiatric: appropriate affact    ASSESSMENT: Pertinent issues addressed/ reviewed during this cardiology visit  Stable coronary artery disease bypass surgery 2023  Heart failure with recovered ejection fraction    RECOMMENDATIONS:  Overall Jerald is doing very well from a cardiac standpoint without any cardiac symptoms.  EF is normalized.  LDL is at goal.  Continue aspirin statin and Zetia for life.  Continue metoprolol long-term.  His EF has normalized.  Return to clinic routinely in 1 year for regular cardiology follow-up sooner if anything changes clinically.    It was a pleasure seeing this patient in clinic today. Please do not hesitate to contact me with any future questions.     TYRELL Choi, [facility]  Cardiology - [facility]  June 5, 2024    This note was completed in part using dictation via the Dragon voice recognition software. Some word and  grammatical errors may occur and must be interpreted in the appropriate clinical context.  If there are any questions pertaining to this issue, please contact me for further clarification.      Thank you for allowing me to participate in the care of your patient.      Sincerely,     Flavia Ibarra MD     [facility]  cc:   ANAIS Mahoney CNP  [address] [place] [address]

## 2024-06-05 NOTE — PROGRESS NOTES
CARDIOLOGY CLINIC CONSULTATION    PRIMARY CARE PHYSICIAN:  Skip Rubio    HISTORY OF PRESENT ILLNESS:  This is a very pleasant 63-year-old gentleman who is here in follow up.      The patient saw me in May 2023 with a high risk positive stress test and LV dysfunction & typical symptoms of angina.  He was referred for coronary angiography that revealed multivessel coronary artery disease. Subsequently he underwent CABG x3 (left internal mammary artery to left anterior descending artery, saphenous vein graft to posterior descending artery, saphenous vein graft to first obtuse marginal artery) on June 30, 2023.      He had mild ischemic cardiomyopathy with EF of around 40 to 45% which subsequently normalized and of 2023.     Today is here for routine follow-up.  Denies any cardiovascular symptoms.  Sternal incision is healed well.  He does not smoke.  No syncope presyncope heart failure or angina reported.  He says he easily walks about 4 miles every day.  He is compliant with his medications.  LDL is at goal.  Last LDL was 66.    PAST MEDICAL HISTORY:  Past Medical History:   Diagnosis Date    Coronary artery disease 6/30/2023    Unspecified part of closed fracture of clavicle     comminuted displaced fx of left clavicle       MEDICATIONS:  Current Outpatient Medications   Medication Sig Dispense Refill    acetaminophen (TYLENOL) 325 MG tablet Take 2 tablets (650 mg) by mouth every 4 hours as needed for other (For optimal non-opioid multimodal pain management to improve pain control.)      aspirin 81 MG EC tablet Take 1 tablet (81 mg) by mouth daily      ezetimibe (ZETIA) 10 MG tablet Take 1 tablet (10 mg) by mouth daily 30 tablet 3    metoprolol succinate ER (TOPROL XL) 50 MG 24 hr tablet Take 1 tablet (50 mg) by mouth daily 90 tablet 3    Multiple Vitamin (MULTI-VITAMIN DAILY) TABS Take 1 tablet by mouth daily      rosuvastatin (CRESTOR) 40 MG tablet Take 1 tablet (40 mg) by mouth daily 30 tablet 11     No  current facility-administered medications for this visit.       SOCIAL HISTORY:  I have reviewed this patient's social history and updated it with pertinent information if needed. Jerald Lawrence  reports that he quit smoking about 17 months ago. His smoking use included cigarettes. He started smoking about 46 years ago. He has a 33.8 pack-year smoking history. He has never been exposed to tobacco smoke. He quit smokeless tobacco use about 42 years ago.  His smokeless tobacco use included chew. He reports current alcohol use. He reports that he does not use drugs.    PHYSICAL EXAM:  Pulse:  [72] 72  Resp:  [14] 14  BP: (132)/(86) 132/86  SpO2:  [97 %] 97 %  202 lbs 6.4 oz    Constitutional: alert, no distress  Respiratory: Good bilateral air entry  Cardiovascular: Normal regular heart sounds no murmur rubs or gallops no edema  GI: nondistended  Neuropsychiatric: appropriate affact    ASSESSMENT: Pertinent issues addressed/ reviewed during this cardiology visit  Stable coronary artery disease bypass surgery 2023  Heart failure with recovered ejection fraction    RECOMMENDATIONS:  Overall Jerald is doing very well from a cardiac standpoint without any cardiac symptoms.  EF is normalized.  LDL is at goal.  Continue aspirin statin and Zetia for life.  Continue metoprolol long-term.  His EF has normalized.  Return to clinic routinely in 1 year for regular cardiology follow-up sooner if anything changes clinically.    It was a pleasure seeing this patient in clinic today. Please do not hesitate to contact me with any future questions.     TYRELL Choi, [facility]  Cardiology - [facility]  June 5, 2024    This note was completed in part using dictation via the Dragon voice recognition software. Some word and grammatical errors may occur and must be interpreted in the appropriate clinical context.  If there are any questions pertaining to this issue, please contact me for further clarification.

## 2024-06-06 ENCOUNTER — MYC MEDICAL ADVICE (OUTPATIENT)
Dept: FAMILY MEDICINE | Facility: CLINIC | Age: 64
End: 2024-06-06
Payer: COMMERCIAL

## 2024-06-06 DIAGNOSIS — Z87.891 PERSONAL HISTORY OF TOBACCO USE: Primary | ICD-10-CM

## 2024-06-06 PROCEDURE — G0296 VISIT TO DETERM LDCT ELIG: HCPCS | Performed by: FAMILY MEDICINE

## 2024-06-06 NOTE — PATIENT INSTRUCTIONS
Lung Cancer Screening   Frequently Asked Questions  If you are at high-risk for lung cancer, getting screened with low-dose computed tomography (LDCT) every year can help save your life. This handout offers answers to some of the most common questions about lung cancer screening. If you have other questions, please call [phone] ([phone]).     What is it?  Lung cancer screening uses special X-ray technology to create an image of your lung tissue. The exam is quick and easy and takes less than 10 seconds. We don t give you any medicine or use any needles. You can eat before and after the exam. You don t need to change your clothes as long as the clothing on your chest doesn t contain metal. But, you do need to be able to hold your breath for at least 6 seconds during the exam.    What is the goal of lung cancer screening?  The goal of lung cancer screening is to save lives. Many times, lung cancer is not found until a person starts having physical symptoms. Lung cancer screening can help detect lung cancer in the earliest stages when it may be easier to treat.    Who should be screened for lung cancer?  We suggest lung cancer screening for anyone who is at high-risk for lung cancer. You are in the high-risk group if you:      are between the ages of 55 and 79, and    have smoked at least 1 pack of cigarettes a day for 20 or more years, and    still smoke or have quit within the past 15 years.    However, if you have a new cough or shortness of breath, you should talk to your doctor before being screened.    Why does it matter if I have symptoms?  Certain symptoms can be a sign that you have a condition in your lungs that should be checked and treated by your doctor. These symptoms include fever, chest pain, a new or changing cough, shortness of breath that you have never felt before, coughing up blood or unexplained weight loss. Having any of these symptoms can greatly affect the results of lung  cancer screening.       Should all smokers get an LDCT lung cancer screening exam?  It depends. Lung cancer screening is for a very specific group of men and women who have a history of heavy smoking over a long period of time (see  Who should be screened for lung cancer  above).  I am in the high-risk group, but have been diagnosed with cancer in the past. Is LDCT lung cancer screening right for me?  In some cases, you should not have LDCT lung screening, such as when your doctor is already following your cancer with CT scan studies. Your doctor will help you decide if LDCT lung screening is right for you.  Do I need to have a screening exam every year?  Yes. If you are in the high-risk group described earlier, you should get an LDCT lung cancer screening exam every year until you are 79, or are no longer willing or able to undergo screening and possible procedures to diagnose and treat lung cancer.  How effective is LDCT at preventing death from lung cancer?  Studies have shown that LDCT lung cancer screening can lower the risk of death from lung cancer by 20 percent in people who are at high-risk.  What are the risks?  There are some risks and limitations of LDCT lung cancer screening. We want to make sure you understand the risks and benefits, so please let us know if you have any questions. Your doctor may want to talk with you more about these risks.    Radiation exposure: As with any exam that uses radiation, there is a very small increased risk of cancer. The amount of radiation in LDCT is small--about the same amount a person would get from a mammogram. Your doctor orders the exam when he or she feels the potential benefits outweigh the risks.    False negatives: No test is perfect, including LDCT. It is possible that you may have a medical condition, including lung cancer, that is not found during your exam. This is called a false negative result.    False positives and more testing: LDCT very often finds  something in the lung that could be cancer, but in fact is not. This is called a false positive result. False positive tests often cause anxiety. To make sure these findings are not cancer, you may need to have more tests. These tests will be done only if you give us permission. Sometimes patients need a treatment that can have side effects, such as a biopsy. For more information on false positives, see  What can I expect from the results?     Findings not related to lung cancer: Your LDCT exam also takes pictures of areas of your body next to your lungs. In a very small number of cases, the CT scan will show an abnormal finding in one of these areas, such as your kidneys, adrenal glands, liver or thyroid. This finding may not be serious, but you may need more tests. Your doctor can help you decide what other tests you may need, if any.  What can I expect from the results?  About 1 out of 4 LDCT exams will find something that may need more tests. Most of the time, these findings are lung nodules. Lung nodules are very small collections of tissue in the lung. These nodules are very common, and the vast majority--more than 97 percent--are not cancer (benign). Most are normal lymph nodes or small areas of scarring from past infections.  But, if a small lung nodule is found to be cancer, the cancer can be cured more than 90 percent of the time. To know if the nodule is cancer, we may need to get more images before your next yearly screening exam. If the nodule has suspicious features (for example, it is large, has an odd shape or grows over time), we will refer you to a specialist for further testing.  Will my doctor also get the results?  Yes. Your doctor will get a copy of your results.  Is it okay to keep smoking now that there s a cancer screening exam?  No. Tobacco is one of the strongest cancer-causing agents. It causes not only lung cancer, but other cancers and cardiovascular (heart) diseases as well. The damage  caused by smoking builds over time. This means that the longer you smoke, the higher your risk of disease. While it is never too late to quit, the sooner you quit, the better.  Where can I find help to quit smoking?  The best way to prevent lung cancer is to stop smoking. If you have already quit smoking, congratulations and keep it up! For help on quitting smoking, please call [employer] at 6-089-QUITNOW (1-250.730.3022) or the American Cancer Society at 1-316.266.9627 to find local resources near you.  One-on-one health coaching:  If you d prefer to work individually with a health care provider on tobacco cessation, we offer:      Medication Therapy Management:  Our specially trained pharmacists work closely with you and your doctor to help you quit smoking.  Call [phone] or [phone] (toll free).

## 2024-06-06 NOTE — TELEPHONE ENCOUNTER
Lung Cancer Screening Shared Decision Making Visit     Jerald Lawrence, a 63 year old male, is eligible for lung cancer screening    History   Smoking Status    Former    Packs/day: 0.75    Years: 35.00    Types: Cigarettes    Start date: 1978    Quit date: 1/1/2023   Smokeless Tobacco    Former    Types: Chew    Quit date: 1982       I have discussed with patient the risks and benefits of screening for lung cancer with low-dose CT.     The risks include:    radiation exposure: one low dose chest CT has as much ionizing radiation as about 15 chest x-rays, or 6 months of background radiation living in [place]      false positives: most findings/nodules are NOT cancer, but some might still require additional diagnostic evaluation, including biopsy    over-diagnosis: some slow growing cancers that might never have been clinically significant will be detected and treated unnecessarily     The benefit of early detection of lung cancer is contingent upon adherence to annual screening or more frequent follow up if indicated.     Furthermore, to benefit from screening, Jerald must be willing and able to undergo diagnostic procedures, if indicated. Although no specific guide is available for determining severity of comorbidities, it is reasonable to withhold screening in patients who have greater mortality risk from other diseases.     We did discuss that the best way to prevent lung cancer is to not smoke.    Some patients may value a numeric estimation of lung cancer risk when evaluating if lung cancer screening is right for them, here is one calculator:    ShouldIScreen

## 2024-06-16 ENCOUNTER — HEALTH MAINTENANCE LETTER (OUTPATIENT)
Age: 64
End: 2024-06-16

## 2024-06-18 DIAGNOSIS — E78.5 HYPERLIPIDEMIA LDL GOAL <70: ICD-10-CM

## 2024-06-18 RX ORDER — EZETIMIBE 10 MG/1
10 TABLET ORAL DAILY
Qty: 30 TABLET | Refills: 5 | Status: SHIPPED | OUTPATIENT
Start: 2024-06-18 | End: 2024-07-02

## 2024-06-18 NOTE — TELEPHONE ENCOUNTER
Last Office Visit: 6/5/24 Fred   Next Office Visit: None scheduled   Last Fill Date: 5/21/24    Meghna Tomlinson CMA 6/18/2024 9:18 AM

## 2024-06-18 NOTE — TELEPHONE ENCOUNTER
[facility] Cardiology Refill Guideline reviewed.  Medication meets criteria for refill.    Lois Larsen, RN

## 2024-06-28 ENCOUNTER — HOSPITAL ENCOUNTER (OUTPATIENT)
Dept: CT IMAGING | Facility: CLINIC | Age: 64
Discharge: HOME | End: 2024-06-28
Attending: FAMILY MEDICINE | Admitting: FAMILY MEDICINE
Payer: COMMERCIAL

## 2024-06-28 DIAGNOSIS — Z87.891 PERSONAL HISTORY OF TOBACCO USE: ICD-10-CM

## 2024-06-28 PROCEDURE — 71271 CT THORAX LUNG CANCER SCR C-: CPT

## 2024-06-28 SDOH — HEALTH STABILITY: PHYSICAL HEALTH: ON AVERAGE, HOW MANY MINUTES DO YOU ENGAGE IN EXERCISE AT THIS LEVEL?: 60 MIN

## 2024-06-28 SDOH — HEALTH STABILITY: PHYSICAL HEALTH: ON AVERAGE, HOW MANY DAYS PER WEEK DO YOU ENGAGE IN MODERATE TO STRENUOUS EXERCISE (LIKE A BRISK WALK)?: 4 DAYS

## 2024-06-28 ASSESSMENT — SOCIAL DETERMINANTS OF HEALTH (SDOH): HOW OFTEN DO YOU GET TOGETHER WITH FRIENDS OR RELATIVES?: PATIENT DECLINED

## 2024-06-28 NOTE — RESULT ENCOUNTER NOTE
Marian Kohler.  I see you met with Jerald earlier this month.  He had a LDCT chest that showed borderline ascending thoracic aorta dilatation. I thought I'd keep you in the loop so this can be monitored next time he goes for any cardiac imaging/testing. Will he be getting another echo this year?

## 2024-07-01 DIAGNOSIS — I71.21 THORACIC ASCENDING AORTIC ANEURYSM (H): Primary | ICD-10-CM

## 2024-07-02 ENCOUNTER — OFFICE VISIT (OUTPATIENT)
Dept: FAMILY MEDICINE | Facility: CLINIC | Age: 64
End: 2024-07-02
Payer: COMMERCIAL

## 2024-07-02 VITALS
SYSTOLIC BLOOD PRESSURE: 110 MMHG | DIASTOLIC BLOOD PRESSURE: 72 MMHG | BODY MASS INDEX: 27.97 KG/M2 | HEIGHT: 71 IN | HEART RATE: 69 BPM | TEMPERATURE: 96.8 F | WEIGHT: 199.8 LBS | OXYGEN SATURATION: 98 % | RESPIRATION RATE: 16 BRPM

## 2024-07-02 DIAGNOSIS — Z00.00 ROUTINE GENERAL MEDICAL EXAMINATION AT A HEALTH CARE FACILITY: Primary | ICD-10-CM

## 2024-07-02 DIAGNOSIS — E78.5 HYPERLIPIDEMIA LDL GOAL <70: ICD-10-CM

## 2024-07-02 PROCEDURE — 99213 OFFICE O/P EST LOW 20 MIN: CPT | Mod: 25 | Performed by: FAMILY MEDICINE

## 2024-07-02 PROCEDURE — 90715 TDAP VACCINE 7 YRS/> IM: CPT | Performed by: FAMILY MEDICINE

## 2024-07-02 PROCEDURE — 99396 PREV VISIT EST AGE 40-64: CPT | Mod: 25 | Performed by: FAMILY MEDICINE

## 2024-07-02 PROCEDURE — 90471 IMMUNIZATION ADMIN: CPT | Performed by: FAMILY MEDICINE

## 2024-07-02 PROCEDURE — 90472 IMMUNIZATION ADMIN EACH ADD: CPT | Performed by: FAMILY MEDICINE

## 2024-07-02 PROCEDURE — 90678 RSV VACC PREF BIVALENT IM: CPT | Performed by: FAMILY MEDICINE

## 2024-07-02 RX ORDER — EZETIMIBE 10 MG/1
10 TABLET ORAL DAILY
Qty: 90 TABLET | Refills: 3 | Status: SHIPPED | OUTPATIENT
Start: 2024-07-02

## 2024-07-02 RX ORDER — ROSUVASTATIN CALCIUM 40 MG/1
40 TABLET, COATED ORAL DAILY
Qty: 30 TABLET | Refills: 11 | Status: SHIPPED | OUTPATIENT
Start: 2024-07-02

## 2024-07-02 ASSESSMENT — PAIN SCALES - GENERAL: PAINLEVEL: NO PAIN (0)

## 2024-07-02 NOTE — PATIENT INSTRUCTIONS
Patient Education     Updated prescription quantities for rosuvastatin and exetemibe.  Continue to see cardiollgy as they recommended to you.    Be consistent with low salt, low trans fat and low saturated fat diet.  Eat food rich in omega-3-fatty acids as you tolerate. (salmon, olive oil)  Eat 5 cups of vegetables, fruits and whole grains per day.  Limit starchy food (white rice, white bread, white pasta, white potatoes) to less than a cup per meal.  Minimize sweets, junk food and fastfood. Limit soda beverages to one serving per day; best to avoid it altogether though.    Exercise: moderate intensity sustained for at least 30 mins per episode, goal of 150 mins per week at least  Combine cardiovascular and resistance exercises.  These exercise recommendations are in addition to your daily activity at work or home.  Work on losing weight.    Get a flu shot in the fall.    Preventative Care Visits include: Yearly physicals, Well-child visits, Welcome to Medicare visits, & Medicare yearly wellness exams.    The purpose of these visits is to discuss your medical history and prevent health problems before you are sick.  You may need to pay a copay, coinsurance or deductible if your visit today includes testing or treating for a new or existing condition.    Additional charges may be incurred for today's visit. If you have questions about what your insurance plan covers, please contact your Insurance Company's member service department.  If you have questions specific to a bill you have already received from [employer], please contact the [employer] Billing Office at [phone].    Preventive Care Advice   This is general advice we often give to help people stay healthy. Your care team may have specific advice just for you. Please talk to your care team about your own preventive care needs.  Lifestyle  Exercise at least 150 minutes each week (30 minutes a day, 5 days a week).  Do muscle strengthening activities 2 days a  "week. These help control your weight and prevent disease.  No smoking.  Wear sunscreen to prevent skin cancer.  Have your home tested for radon every 2 to 5 years. Radon is a colorless, odorless gas that can harm your lungs. To learn more, go to www.health.state.[place]. and search for \"Radon in Homes.\"  Keep guns unloaded and locked up in a safe place like a safe or gun vault, or, use a gun lock and hide the keys. Always lock away bullets separately. To learn more, visit [employer].[place].gov and search for \"safe gun storage.\"  Nutrition  Eat 5 or more servings of fruits and vegetables each day.  Try wheat bread, brown rice and whole grain pasta (instead of white bread, rice, and pasta).  Get enough calcium and vitamin D. Check the label on foods and aim for 100% of the RDA (recommended daily allowance).  Regular exams  Have a dental exam and cleaning every 6 months.  See your health care team every year to talk about:  Any changes in your health.  Any medicines your care team has prescribed.  Preventive care, family planning, and ways to prevent chronic diseases.  Shots (vaccines)   HPV shots (up to age 26), if you've never had them before.  Hepatitis B shots (up to age 59), if you've never had them before.  COVID-19 shot: Get this shot when it's due.  Flu shot: Get a flu shot every year.  Tetanus shot: Get a tetanus shot every 10 years.  Pneumococcal, hepatitis A, and RSV shots: Ask your care team if you need these based on your risk.  Shingles shot (for age 50 and up).  General health tests  Diabetes screening:  Starting at age 35, Get screened for diabetes at least every 3 years.  If you are younger than age 35, ask your care team if you should be screened for diabetes.  Cholesterol test: At age 39, start having a cholesterol test every 5 years, or more often if advised.  Bone density scan (DEXA): At age 50, ask your care team if you should have this scan for osteoporosis (brittle bones).  Hepatitis C: Get tested at least " once in your life.  Abdominal aortic aneurysm screening: Talk to your doctor about having this screening if you:  Have ever smoked; and  Are biologically male; and  Are between the ages of 65 and 75.  STIs (sexually transmitted infections)  Before age 24: Ask your care team if you should be screened for STIs.  After age 24: Get screened for STIs if you're at risk. You are at risk for STIs (including HIV) if:  You are sexually active with more than one person.  You don't use condoms every time.  You or a partner was diagnosed with a sexually transmitted infection.  If you are at risk for HIV, ask about PrEP medicine to prevent HIV.  Get tested for HIV at least once in your life, whether you are at risk for HIV or not.  Cancer screening tests  Cervical cancer screening: If you have a cervix, begin getting regular cervical cancer screening tests at age 21. Most people who have regular screenings with normal results can stop after age 65. Talk about this with your provider.  Breast cancer scan (mammogram): If you've ever had breasts, begin having regular mammograms starting at age 40. This is a scan to check for breast cancer.  Colon cancer screening: It is important to start screening for colon cancer at age 45.  Have a colonoscopy test every 10 years (or more often if you're at risk) Or, ask your provider about stool tests like a FIT test every year or Cologuard test every 3 years.  To learn more about your testing options, visit: www.[employer]/335150.pdf.  For help making a decision, visit: perri/qm41633.  Prostate cancer screening test: If you have a prostate and are age 55 to 69, ask your provider if you would benefit from a yearly prostate cancer screening test.  Lung cancer screening: If you are a current or former smoker age 50 to 80, ask your care team if ongoing lung cancer screenings are right for you.  For informational purposes only. Not to replace the advice of your health care provider. Copyright    2023 [facility]. All rights reserved. Clinically reviewed by the [facility] Transitions Program. Daojia [id number] - REV 04/24.

## 2024-07-02 NOTE — PROGRESS NOTES
"Preventive Care Visit  [facility]  Skip Rubio MD, Family Medicine  Jul 2, 2024      Assessment & Plan     Routine general medical examination at a health care facility  Patient was advised on recommended screening and preventive health recommendations.  He verbalized understanding and agreed to the plans below.   He will get RSV and TDAP vaccines today.    Hyperlipidemia LDL goal <70  Reinforced heart healthy lifestyle.  Labs up to date.  - ezetimibe (ZETIA) 10 MG tablet  Dispense: 90 tablet; Refill: 3  - rosuvastatin (CRESTOR) 40 MG tablet  Dispense: 30 tablet; Refill: 11  - OFFICE/OUTPT VISIT,BETITO DICKINSON III      Patient has been advised of split billing requirements and indicates understanding: Yes        BMI  Estimated body mass index is 28.26 kg/m  as calculated from the following:    Height as of this encounter: 1.791 m (5' 10.5\").    Weight as of this encounter: 90.6 kg (199 lb 12.8 oz).   Weight management plan: Discussed healthy diet and exercise guidelines    Counseling  Appropriate preventive services were discussed with this patient, including applicable screening as appropriate for fall prevention, nutrition, physical activity, Tobacco-use cessation, weight loss and cognition.  Checklist reviewing preventive services available has been given to the patient.  Reviewed patient's diet, addressing concerns and/or questions.       Patient Instructions   Patient Education    Updated prescription quantities for rosuvastatin and exetemibe.  Continue to see cardiollgy as they recommended to you.    Be consistent with low salt, low trans fat and low saturated fat diet.  Eat food rich in omega-3-fatty acids as you tolerate. (salmon, olive oil)  Eat 5 cups of vegetables, fruits and whole grains per day.  Limit starchy food (white rice, white bread, white pasta, white potatoes) to less than a cup per meal.  Minimize sweets, junk food and fastfood. Limit soda beverages to one serving " "per day; best to avoid it altogether though.    Exercise: moderate intensity sustained for at least 30 mins per episode, goal of 150 mins per week at least  Combine cardiovascular and resistance exercises.  These exercise recommendations are in addition to your daily activity at work or home.  Work on losing weight.    Get a flu shot in the fall.    Preventative Care Visits include: Yearly physicals, Well-child visits, Welcome to Medicare visits, & Medicare yearly wellness exams.    The purpose of these visits is to discuss your medical history and prevent health problems before you are sick.  You may need to pay a copay, coinsurance or deductible if your visit today includes testing or treating for a new or existing condition.    Additional charges may be incurred for today's visit. If you have questions about what your insurance plan covers, please contact your Insurance Company's member service department.  If you have questions specific to a bill you have already received from [employer], please contact the [employer] Billing Office at [phone].    Preventive Care Advice   This is general advice we often give to help people stay healthy. Your care team may have specific advice just for you. Please talk to your care team about your own preventive care needs.  Lifestyle  Exercise at least 150 minutes each week (30 minutes a day, 5 days a week).  Do muscle strengthening activities 2 days a week. These help control your weight and prevent disease.  No smoking.  Wear sunscreen to prevent skin cancer.  Have your home tested for radon every 2 to 5 years. Radon is a colorless, odorless gas that can harm your lungs. To learn more, go to www.health.[facility].[place] and search for \"Radon in Homes.\"  Keep guns unloaded and locked up in a safe place like a safe or gun vault, or, use a gun lock and hide the keys. Always lock away bullets separately. To learn more, visit [employer].mn.gov and search for \"safe gun " "storage.\"  Nutrition  Eat 5 or more servings of fruits and vegetables each day.  Try wheat bread, brown rice and whole grain pasta (instead of white bread, rice, and pasta).  Get enough calcium and vitamin D. Check the label on foods and aim for 100% of the RDA (recommended daily allowance).  Regular exams  Have a dental exam and cleaning every 6 months.  See your health care team every year to talk about:  Any changes in your health.  Any medicines your care team has prescribed.  Preventive care, family planning, and ways to prevent chronic diseases.  Shots (vaccines)   HPV shots (up to age 26), if you've never had them before.  Hepatitis B shots (up to age 59), if you've never had them before.  COVID-19 shot: Get this shot when it's due.  Flu shot: Get a flu shot every year.  Tetanus shot: Get a tetanus shot every 10 years.  Pneumococcal, hepatitis A, and RSV shots: Ask your care team if you need these based on your risk.  Shingles shot (for age 50 and up).  General health tests  Diabetes screening:  Starting at age 35, Get screened for diabetes at least every 3 years.  If you are younger than age 35, ask your care team if you should be screened for diabetes.  Cholesterol test: At age 39, start having a cholesterol test every 5 years, or more often if advised.  Bone density scan (DEXA): At age 50, ask your care team if you should have this scan for osteoporosis (brittle bones).  Hepatitis C: Get tested at least once in your life.  Abdominal aortic aneurysm screening: Talk to your doctor about having this screening if you:  Have ever smoked; and  Are biologically male; and  Are between the ages of 65 and 75.  STIs (sexually transmitted infections)  Before age 24: Ask your care team if you should be screened for STIs.  After age 24: Get screened for STIs if you're at risk. You are at risk for STIs (including HIV) if:  You are sexually active with more than one person.  You don't use condoms every time.  You or a " partner was diagnosed with a sexually transmitted infection.  If you are at risk for HIV, ask about PrEP medicine to prevent HIV.  Get tested for HIV at least once in your life, whether you are at risk for HIV or not.  Cancer screening tests  Cervical cancer screening: If you have a cervix, begin getting regular cervical cancer screening tests at age 21. Most people who have regular screenings with normal results can stop after age 65. Talk about this with your provider.  Breast cancer scan (mammogram): If you've ever had breasts, begin having regular mammograms starting at age 40. This is a scan to check for breast cancer.  Colon cancer screening: It is important to start screening for colon cancer at age 45.  Have a colonoscopy test every 10 years (or more often if you're at risk) Or, ask your provider about stool tests like a FIT test every year or Cologuard test every 3 years.  To learn more about your testing options, visit: www.[employer]/229112.pdf.  For help making a decision, visit: perri/xb40218.  Prostate cancer screening test: If you have a prostate and are age 55 to 69, ask your provider if you would benefit from a yearly prostate cancer screening test.  Lung cancer screening: If you are a current or former smoker age 50 to 80, ask your care team if ongoing lung cancer screenings are right for you.  For informational purposes only. Not to replace the advice of your health care provider. Copyright   2023 [facility]. All rights reserved. Clinically reviewed by the [facility] Transitions Program. [employer] [id number] - REV 04/24.       Jung Marques is a 63 year old, presenting for the following:  Physical, Hypertension, and Lipids        7/2/2024     7:43 AM   Additional Questions   Roomed by Quyen ZARAGOZA MA   Accompanied by self         7/2/2024     7:43 AM   Patient Reported Additional Medications   Patient reports taking the following new medications none        Health Care  Directive  Patient does not have a Health Care Directive or Living Will: Discussed advance care planning with patient; however, patient declined at this time.    HPI      Hyperlipidemia Follow-Up    Are you regularly taking any medication or supplement to lower your cholesterol?   Yes- ezetimibe 10mg and rosuvastatin 40mg  Are you having muscle aches or other side effects that you think could be caused by your cholesterol lowering medication?  Yes- slightly light headed occasionally    Hypertension Follow-up    Do you check your blood pressure regularly outside of the clinic? Yes   Are you following a low salt diet? Yes  Are your blood pressures ever more than 140 on the top number (systolic) OR more   than 90 on the bottom number (diastolic), for example 140/90? No        6/28/2024   General Health   How would you rate your overall physical health? Good   Feel stress (tense, anxious, or unable to sleep) Not at all            6/28/2024   Nutrition   Three or more servings of calcium each day? Yes   Diet: Low fat/cholesterol   How many servings of fruit and vegetables per day? (!) 2-3   How many sweetened beverages each day? 0-1            6/28/2024   Exercise   Days per week of moderate/strenous exercise 4 days   Average minutes spent exercising at this level 60 min            6/28/2024   Social Factors   Frequency of gathering with friends or relatives Patient declined   Worry food won't last until get money to buy more No   Food not last or not have enough money for food? No   Do you have housing? (Housing is defined as stable permanent housing and does not include staying ouside in a car, in a tent, in an abandoned building, in an overnight shelter, or couch-surfing.) Yes   Are you worried about losing your housing? No   Lack of transportation? No   Unable to get utilities (heat,electricity)? No            6/28/2024   Fall Risk   Fallen 2 or more times in the past year? No   Trouble with walking or balance? No              2024   Dental   Dentist two times every year? Yes            2024   TB Screening   Were you born outside of the [place]? No              Today's PHQ-2 Score:       2024     8:51 AM   PHQ-2 (  [employer])   Q1: Little interest or pleasure in doing things 0   Q2: Feeling down, depressed or hopeless 0   PHQ-2 Score 0         2024   Substance Use   Alcohol more than 3/day or more than 7/wk No   Do you use any other substances recreationally? No        Social History     Tobacco Use    Smoking status: Former     Current packs/day: 0.00     Average packs/day: 0.8 packs/day for 45.0 years (33.8 ttl pk-yrs)     Types: Cigarettes     Start date:      Quit date: 2023     Years since quittin.5     Passive exposure: Never    Smokeless tobacco: Never    Tobacco comments:     Not smoking for last year and half.   Vaping Use    Vaping status: Never Used   Substance Use Topics    Alcohol use: Yes     Comment: rare    Drug use: No           2024   STI Screening   New sexual partner(s) since last STI/HIV test? No      Last PSA:   PSA   Date Value Ref Range Status   2018 1.09 0 - 4 ug/L Final     Comment:     Assay Method:  Chemiluminescence using Siemens Vista analyzer     Prostate Specific Antigen Screen   Date Value Ref Range Status   2023 0.81 0.00 - 4.50 ng/mL Final     ASCVD Risk   The ASCVD Risk score (Ke LUI, et al., 2019) failed to calculate for the following reasons:    The valid total cholesterol range is 130 to 320 mg/dL           Reviewed and updated as needed this visit by Provider                    Past Medical History:   Diagnosis Date    Coronary artery disease 2023    Unspecified part of closed fracture of clavicle     comminuted displaced fx of left clavicle     Past Surgical History:   Procedure Laterality Date    BYPASS GRAFT ARTERY CORONARY N/A 2023    Procedure: Median Sternotomy, Cardiopulmonary Byass, Endovascular Greater Saphenous  Vein [place] Left Leg, Coronary Artery Bypass Grafting x 3, IntraoperativeTransesophageal Echocardiogram (Anesthsia);  Surgeon: Sushant Ferrer MD;  Location: [facility] OR    COLONOSCOPY      CV CORONARY ANGIOGRAM N/A 05/31/2023    Procedure: Coronary Angiogram;  Surgeon: Alvaro Acosta MD;  Location:  HEART CARDIAC CATH LAB    CV LEFT HEART CATH N/A 05/31/2023    Procedure: Left Heart Catheterization;  Surgeon: Alvaro Acosta MD;  Location:  HEART CARDIAC CATH LAB    CV LEFT VENTRICULOGRAM N/A 05/31/2023    Procedure: Left Ventriculogram;  Surgeon: Alvaro Acosta MD;  Location:  HEART CARDIAC CATH LAB    CV PCI N/A 05/31/2023    Procedure: Percutaneous Coronary Intervention;  Surgeon: Alvaro Acosta MD;  Location:  HEART CARDIAC CATH LAB     Patient Active Problem List   Diagnosis    CARDIOVASCULAR SCREENING; LDL GOAL LESS THAN 130    Tobacco abuse    Thyroid nodule    Hyperlipidemia LDL goal <70    Status post coronary angiogram    Recurrent chest pain    Abnormal nuclear stress test    Left ventricular hypertrophy by electrocardiogram    Coronary artery disease    History of ischemic cardiomyopathy     Past Surgical History:   Procedure Laterality Date    BYPASS GRAFT ARTERY CORONARY N/A 06/30/2023    Procedure: Median Sternotomy, Cardiopulmonary Byass, Endovascular Greater Saphenous Vein [place] Left Leg, Coronary Artery Bypass Grafting x 3, IntraoperativeTransesophageal Echocardiogram (Anesthsia);  Surgeon: Sushant Ferrer MD;  Location: [facility] OR    COLONOSCOPY      CV CORONARY ANGIOGRAM N/A 05/31/2023    Procedure: Coronary Angiogram;  Surgeon: Alvaro Acosta MD;  Location:  HEART CARDIAC CATH LAB    CV LEFT HEART CATH N/A 05/31/2023    Procedure: Left Heart Catheterization;  Surgeon: Alvaro Acosta MD;  Location:  HEART CARDIAC CATH LAB    CV LEFT VENTRICULOGRAM N/A 05/31/2023    Procedure: Left Ventriculogram;  Surgeon: Alvaro Acosta MD;  Location:   "HEART CARDIAC CATH LAB    CV PCI N/A 2023    Procedure: Percutaneous Coronary Intervention;  Surgeon: Alvaro Acosta MD;  Location:  HEART CARDIAC CATH LAB       Social History     Tobacco Use    Smoking status: Former     Current packs/day: 0.00     Average packs/day: 0.8 packs/day for 45.0 years (33.8 ttl pk-yrs)     Types: Cigarettes     Start date:      Quit date: 2023     Years since quittin.5     Passive exposure: Never    Smokeless tobacco: Never    Tobacco comments:     Not smoking for last year and half.   Substance Use Topics    Alcohol use: Yes     Comment: rare     Family History   Problem Relation Age of Onset    Cancer - colorectal Father     C.A.D. Father     Blood Disease Brother     Cardiovascular Brother     C.A.D. Brother          Current Outpatient Medications   Medication Sig Dispense Refill    aspirin 81 MG EC tablet Take 1 tablet (81 mg) by mouth daily      ezetimibe (ZETIA) 10 MG tablet Take 1 tablet (10 mg) by mouth daily 30 tablet 5    metoprolol succinate ER (TOPROL XL) 50 MG 24 hr tablet Take 1 tablet (50 mg) by mouth daily 90 tablet 3    Multiple Vitamin (MULTI-VITAMIN DAILY) TABS Take 1 tablet by mouth daily      rosuvastatin (CRESTOR) 40 MG tablet Take 1 tablet (40 mg) by mouth daily 30 tablet 11    acetaminophen (TYLENOL) 325 MG tablet Take 2 tablets (650 mg) by mouth every 4 hours as needed for other (For optimal non-opioid multimodal pain management to improve pain control.) (Patient not taking: Reported on 2024)       No Known Allergies      Review of Systems  Constitutional, HEENT, cardiovascular, pulmonary, GI, , musculoskeletal, neuro, skin, endocrine and psych systems are negative, except as otherwise noted.     Objective    Exam  /72 (BP Location: Right arm, Patient Position: Sitting, Cuff Size: Adult Large)   Pulse 69   Temp 96.8  F (36  C) (Tympanic)   Resp 16   Ht 1.791 m (5' 10.5\")   Wt 90.6 kg (199 lb 12.8 oz)   SpO2 98%   " "BMI 28.26 kg/m     Estimated body mass index is 28.26 kg/m  as calculated from the following:    Height as of this encounter: 1.791 m (5' 10.5\").    Weight as of this encounter: 90.6 kg (199 lb 12.8 oz).    Physical Exam  GENERAL APPEARANCE: healthy, alert and no distress  EYES: pink conj, no icterus, PERRL, EOMI  HENT: ear canals and TM's normal, nose and mouth without ulcers or lesions, oropharynx clear and oral mucous membranes moist  NECK: no adenopathy, no asymmetry, masses, or scars and thyroid normal to palpation  RESP: lungs clear to auscultation - no rales, rhonchi or wheezes  CV: regular rates and rhythm, normal S1 S2, no S3 or S4, no murmur, click or rub, no peripheral edema and peripheral pulses strong  ABDOMEN: soft, nontender, no hepatosplenomegaly, no masses and bowel sounds normal  RECTAL: deferred  MS: no musculoskeletal defects are noted and gait is age appropriate without ataxia  SKIN: no suspicious lesions or rashes  NEURO: Normal strength and tone, sensory exam grossly normal, mentation intact and speech normal         Signed Electronically by: Skip Rubio MD    "

## 2024-07-19 ENCOUNTER — HOSPITAL ENCOUNTER (OUTPATIENT)
Dept: MRI IMAGING | Facility: CLINIC | Age: 64
Discharge: HOME | End: 2024-07-19
Attending: NURSE PRACTITIONER | Admitting: NURSE PRACTITIONER
Payer: COMMERCIAL

## 2024-07-19 DIAGNOSIS — I71.21 THORACIC ASCENDING AORTIC ANEURYSM (H): ICD-10-CM

## 2024-07-19 PROCEDURE — A9585 GADOBUTROL INJECTION: HCPCS | Performed by: NURSE PRACTITIONER

## 2024-07-19 PROCEDURE — 258N000003 HC RX IP 258 OP 636: Performed by: NURSE PRACTITIONER

## 2024-07-19 PROCEDURE — 71555 MRI ANGIO CHEST W OR W/O DYE: CPT

## 2024-07-19 PROCEDURE — 255N000002 HC RX 255 OP 636: Performed by: NURSE PRACTITIONER

## 2024-07-19 RX ORDER — GADOBUTROL 604.72 MG/ML
15 INJECTION INTRAVENOUS ONCE
Status: COMPLETED | OUTPATIENT
Start: 2024-07-19 | End: 2024-07-19

## 2024-07-19 RX ADMIN — GADOBUTROL 15 ML: 604.72 INJECTION INTRAVENOUS at 18:28

## 2024-07-19 RX ADMIN — SODIUM CHLORIDE 50 ML: 9 INJECTION, SOLUTION INTRAVENOUS at 18:35

## 2024-10-14 ENCOUNTER — LAB (OUTPATIENT)
Dept: LAB | Facility: CLINIC | Age: 64
End: 2024-10-14
Payer: COMMERCIAL

## 2024-10-14 DIAGNOSIS — I25.10 CORONARY ARTERY DISEASE INVOLVING NATIVE HEART, UNSPECIFIED VESSEL OR LESION TYPE, UNSPECIFIED WHETHER ANGINA PRESENT: ICD-10-CM

## 2024-10-14 DIAGNOSIS — E78.5 HYPERLIPIDEMIA LDL GOAL <70: ICD-10-CM

## 2024-10-14 LAB
ALT SERPL W P-5'-P-CCNC: 31 U/L (ref 0–70)
CHOLEST SERPL-MCNC: 144 MG/DL
FASTING STATUS PATIENT QL REPORTED: YES
HDLC SERPL-MCNC: 57 MG/DL
LDLC SERPL CALC-MCNC: 68 MG/DL
NONHDLC SERPL-MCNC: 87 MG/DL
TRIGL SERPL-MCNC: 95 MG/DL

## 2024-10-14 PROCEDURE — 80061 LIPID PANEL: CPT | Performed by: NURSE PRACTITIONER

## 2024-10-14 PROCEDURE — 36415 COLL VENOUS BLD VENIPUNCTURE: CPT | Performed by: NURSE PRACTITIONER

## 2024-10-14 PROCEDURE — 84460 ALANINE AMINO (ALT) (SGPT): CPT | Performed by: NURSE PRACTITIONER

## 2024-10-21 DIAGNOSIS — I25.810 CORONARY ARTERY DISEASE INVOLVING CORONARY BYPASS GRAFT OF NATIVE HEART WITHOUT ANGINA PECTORIS: ICD-10-CM

## 2024-10-21 DIAGNOSIS — I25.810 CORONARY ARTERY DISEASE INVOLVING CORONARY BYPASS GRAFT OF NATIVE HEART WITHOUT ANGINA PECTORIS: Primary | ICD-10-CM

## 2024-10-21 DIAGNOSIS — E78.5 HYPERLIPIDEMIA LDL GOAL <70: ICD-10-CM

## 2024-10-21 RX ORDER — METOPROLOL SUCCINATE 50 MG/1
50 TABLET, EXTENDED RELEASE ORAL DAILY
Qty: 90 TABLET | Refills: 3 | Status: SHIPPED | OUTPATIENT
Start: 2024-10-21

## 2024-10-21 NOTE — TELEPHONE ENCOUNTER
Last Office Visit: 6/5/24 (Fred)  Next Office Visit: TBD  Last Fill Date: 9/15/24  Katlyn Elena LPN Cardiology   10/21/2024 9:41 AM

## 2024-10-21 NOTE — TELEPHONE ENCOUNTER
[facility] Refill Guideline reviewed.  Medication meets criteria for refill. Meseret Mulligan RN Cardiology October 21, 2024, 9:45 AM

## 2025-04-07 ENCOUNTER — ANCILLARY ORDERS (OUTPATIENT)
Dept: FAMILY MEDICINE | Facility: CLINIC | Age: 65
End: 2025-04-07

## 2025-04-07 ENCOUNTER — HOSPITAL ENCOUNTER (OUTPATIENT)
Dept: MRI IMAGING | Facility: CLINIC | Age: 65
Discharge: HOME | End: 2025-04-07
Attending: NURSE PRACTITIONER | Admitting: NURSE PRACTITIONER
Payer: COMMERCIAL

## 2025-04-07 DIAGNOSIS — E04.1 RIGHT THYROID NODULE: Primary | ICD-10-CM

## 2025-04-07 DIAGNOSIS — E04.1 THYROID NODULE: Primary | ICD-10-CM

## 2025-04-07 DIAGNOSIS — I71.21 THORACIC ASCENDING AORTIC ANEURYSM: ICD-10-CM

## 2025-04-07 PROCEDURE — A9585 GADOBUTROL INJECTION: HCPCS | Performed by: NURSE PRACTITIONER

## 2025-04-07 PROCEDURE — 258N000003 HC RX IP 258 OP 636: Performed by: NURSE PRACTITIONER

## 2025-04-07 PROCEDURE — 71555 MRI ANGIO CHEST W OR W/O DYE: CPT

## 2025-04-07 PROCEDURE — 255N000002 HC RX 255 OP 636: Performed by: NURSE PRACTITIONER

## 2025-04-07 RX ORDER — GADOBUTROL 604.72 MG/ML
15 INJECTION INTRAVENOUS ONCE
Status: COMPLETED | OUTPATIENT
Start: 2025-04-07 | End: 2025-04-07

## 2025-04-07 RX ADMIN — SODIUM CHLORIDE 50 ML: 9 INJECTION, SOLUTION INTRAVENOUS at 07:49

## 2025-04-07 RX ADMIN — GADOBUTROL 15 ML: 604.72 INJECTION INTRAVENOUS at 07:49

## 2025-04-08 ENCOUNTER — LAB (OUTPATIENT)
Dept: LAB | Facility: CLINIC | Age: 65
End: 2025-04-08
Payer: COMMERCIAL

## 2025-04-08 DIAGNOSIS — I25.810 CORONARY ARTERY DISEASE INVOLVING CORONARY BYPASS GRAFT OF NATIVE HEART WITHOUT ANGINA PECTORIS: ICD-10-CM

## 2025-04-08 DIAGNOSIS — E78.5 HYPERLIPIDEMIA LDL GOAL <70: ICD-10-CM

## 2025-04-08 LAB
ALT SERPL W P-5'-P-CCNC: 38 U/L (ref 0–70)
CHOLEST SERPL-MCNC: 173 MG/DL
FASTING STATUS PATIENT QL REPORTED: YES
HDLC SERPL-MCNC: 69 MG/DL
LDLC SERPL CALC-MCNC: 87 MG/DL
NONHDLC SERPL-MCNC: 104 MG/DL
TRIGL SERPL-MCNC: 83 MG/DL

## 2025-04-08 PROCEDURE — 84460 ALANINE AMINO (ALT) (SGPT): CPT | Performed by: NURSE PRACTITIONER

## 2025-04-08 PROCEDURE — 80061 LIPID PANEL: CPT | Performed by: NURSE PRACTITIONER

## 2025-04-08 PROCEDURE — 36415 COLL VENOUS BLD VENIPUNCTURE: CPT | Performed by: NURSE PRACTITIONER

## 2025-04-10 ENCOUNTER — HOSPITAL ENCOUNTER (OUTPATIENT)
Dept: ULTRASOUND IMAGING | Facility: CLINIC | Age: 65
Discharge: HOME | End: 2025-04-10
Attending: FAMILY MEDICINE
Payer: COMMERCIAL

## 2025-04-10 DIAGNOSIS — E04.1 THYROID NODULE: ICD-10-CM

## 2025-04-10 PROCEDURE — 76536 US EXAM OF HEAD AND NECK: CPT

## 2025-05-05 ENCOUNTER — MYC MEDICAL ADVICE (OUTPATIENT)
Dept: FAMILY MEDICINE | Facility: CLINIC | Age: 65
End: 2025-05-05
Payer: COMMERCIAL

## 2025-05-05 DIAGNOSIS — Z87.891 PERSONAL HISTORY OF TOBACCO USE: Primary | ICD-10-CM

## 2025-05-19 ENCOUNTER — MYC REFILL (OUTPATIENT)
Dept: FAMILY MEDICINE | Facility: CLINIC | Age: 65
End: 2025-05-19
Payer: COMMERCIAL

## 2025-05-19 DIAGNOSIS — E78.5 HYPERLIPIDEMIA LDL GOAL <70: ICD-10-CM

## 2025-05-20 RX ORDER — ROSUVASTATIN CALCIUM 40 MG/1
40 TABLET, COATED ORAL DAILY
Qty: 90 TABLET | Refills: 0 | Status: SHIPPED | OUTPATIENT
Start: 2025-05-20

## 2025-06-30 ENCOUNTER — RESULTS FOLLOW-UP (OUTPATIENT)
Dept: FAMILY MEDICINE | Facility: CLINIC | Age: 65
End: 2025-06-30

## 2025-06-30 ENCOUNTER — HOSPITAL ENCOUNTER (OUTPATIENT)
Dept: CT IMAGING | Facility: CLINIC | Age: 65
Discharge: HOME | End: 2025-06-30
Attending: FAMILY MEDICINE | Admitting: FAMILY MEDICINE
Payer: COMMERCIAL

## 2025-06-30 DIAGNOSIS — Z87.891 PERSONAL HISTORY OF TOBACCO USE: ICD-10-CM

## 2025-06-30 PROCEDURE — 71271 CT THORAX LUNG CANCER SCR C-: CPT

## 2025-07-02 ENCOUNTER — RESULTS FOLLOW-UP (OUTPATIENT)
Dept: CARDIOLOGY | Facility: CLINIC | Age: 65
End: 2025-07-02

## 2025-07-02 DIAGNOSIS — E78.5 HYPERLIPIDEMIA LDL GOAL <70: Primary | ICD-10-CM

## 2025-07-05 SDOH — HEALTH STABILITY: PHYSICAL HEALTH: ON AVERAGE, HOW MANY DAYS PER WEEK DO YOU ENGAGE IN MODERATE TO STRENUOUS EXERCISE (LIKE A BRISK WALK)?: 3 DAYS

## 2025-07-05 SDOH — HEALTH STABILITY: PHYSICAL HEALTH: ON AVERAGE, HOW MANY MINUTES DO YOU ENGAGE IN EXERCISE AT THIS LEVEL?: 60 MIN

## 2025-07-05 ASSESSMENT — SOCIAL DETERMINANTS OF HEALTH (SDOH): HOW OFTEN DO YOU GET TOGETHER WITH FRIENDS OR RELATIVES?: MORE THAN THREE TIMES A WEEK

## 2025-07-07 ENCOUNTER — LAB (OUTPATIENT)
Dept: LAB | Facility: CLINIC | Age: 65
End: 2025-07-07
Payer: COMMERCIAL

## 2025-07-07 DIAGNOSIS — E78.5 HYPERLIPIDEMIA LDL GOAL <70: ICD-10-CM

## 2025-07-07 LAB
ALT SERPL W P-5'-P-CCNC: 25 U/L (ref 0–70)
CHOLEST SERPL-MCNC: 83 MG/DL
FASTING STATUS PATIENT QL REPORTED: YES
HDLC SERPL-MCNC: 45 MG/DL
LDLC SERPL CALC-MCNC: 27 MG/DL
NONHDLC SERPL-MCNC: 38 MG/DL
TRIGL SERPL-MCNC: 55 MG/DL

## 2025-07-07 PROCEDURE — 84460 ALANINE AMINO (ALT) (SGPT): CPT

## 2025-07-07 PROCEDURE — 80061 LIPID PANEL: CPT

## 2025-07-07 PROCEDURE — 36415 COLL VENOUS BLD VENIPUNCTURE: CPT

## 2025-07-09 ENCOUNTER — OFFICE VISIT (OUTPATIENT)
Dept: FAMILY MEDICINE | Facility: CLINIC | Age: 65
End: 2025-07-09
Attending: FAMILY MEDICINE
Payer: COMMERCIAL

## 2025-07-09 VITALS
HEART RATE: 68 BPM | SYSTOLIC BLOOD PRESSURE: 114 MMHG | TEMPERATURE: 97.6 F | HEIGHT: 71 IN | RESPIRATION RATE: 16 BRPM | OXYGEN SATURATION: 95 % | WEIGHT: 205.4 LBS | BODY MASS INDEX: 28.76 KG/M2 | DIASTOLIC BLOOD PRESSURE: 82 MMHG

## 2025-07-09 DIAGNOSIS — Z13.1 SCREENING FOR DIABETES MELLITUS: ICD-10-CM

## 2025-07-09 DIAGNOSIS — Z00.00 ROUTINE GENERAL MEDICAL EXAMINATION AT A HEALTH CARE FACILITY: Primary | ICD-10-CM

## 2025-07-09 DIAGNOSIS — Z12.5 SCREENING FOR PROSTATE CANCER: ICD-10-CM

## 2025-07-09 LAB
FASTING STATUS PATIENT QL REPORTED: YES
GLUCOSE SERPL-MCNC: 100 MG/DL (ref 70–99)
PSA SERPL DL<=0.01 NG/ML-MCNC: 1.09 NG/ML (ref 0–4.5)

## 2025-07-09 PROCEDURE — G0103 PSA SCREENING: HCPCS | Performed by: FAMILY MEDICINE

## 2025-07-09 PROCEDURE — 99396 PREV VISIT EST AGE 40-64: CPT | Performed by: FAMILY MEDICINE

## 2025-07-09 PROCEDURE — 3074F SYST BP LT 130 MM HG: CPT | Performed by: FAMILY MEDICINE

## 2025-07-09 PROCEDURE — 36415 COLL VENOUS BLD VENIPUNCTURE: CPT | Performed by: FAMILY MEDICINE

## 2025-07-09 PROCEDURE — 82947 ASSAY GLUCOSE BLOOD QUANT: CPT | Performed by: FAMILY MEDICINE

## 2025-07-09 PROCEDURE — 3079F DIAST BP 80-89 MM HG: CPT | Performed by: FAMILY MEDICINE

## 2025-07-09 PROCEDURE — 1126F AMNT PAIN NOTED NONE PRSNT: CPT | Performed by: FAMILY MEDICINE

## 2025-07-09 ASSESSMENT — PAIN SCALES - GENERAL: PAINLEVEL_OUTOF10: NO PAIN (0)

## 2025-07-09 NOTE — PROGRESS NOTES
"Preventive Care Visit  [facility]  Skip Rubio MD, Family Medicine  Jul 9, 2025      Assessment & Plan     Routine general medical examination at a health care facility  Patient was advised on recommended screening and preventive health recommendations.  He verbalized understanding and agreed to the plans below.     Screening for diabetes mellitus  - Glucose  - Glucose    Screening for prostate cancer  - Prostate Specific Antigen Screen  - Prostate Specific Antigen Screen    Patient has been advised of split billing requirements and indicates understanding: Yes    BMI  Estimated body mass index is 28.85 kg/m  as calculated from the following:    Height as of this encounter: 1.797 m (5' 10.75\").    Weight as of this encounter: 93.2 kg (205 lb 6.4 oz).   Weight management plan: Discussed healthy diet and exercise guidelines    Counseling  Appropriate preventive services were addressed with this patient via screening, questionnaire, or discussion as appropriate for fall prevention, nutrition, physical activity, Tobacco-use cessation, social engagement, weight loss and cognition.  Checklist reviewing preventive services available has been given to the patient.  Reviewed patient's diet, addressing concerns and/or questions.   He is at risk for lack of exercise and has been provided with information to increase physical activity for the benefit of his well-being.   Reviewed preventive health counseling, as reflected in patient instructions    Follow-up   Return in about 1 year (around 7/9/2026) for In-person visit for next wellness exam.     Follow-up Visit   Expected date:  Jul 09, 2026 (Approximate)      Follow Up Appointment Details:     Follow-up with whom?: PCP    Follow-Up for what?: Adult Preventive    How?: In Person                 Jung Marques is a 64 year old, presenting for the following:  Physical        7/9/2025     6:46 AM   Additional Questions   Roomed by Minoo DAWSON"   Accompanied by self          HPI     Patient denies any questions today.    Sees cardiology for CAD.    Advance Care Planning    Discussed advance care planning with patient; informed AVS has link to [employer].        7/5/2025   General Health   How would you rate your overall physical health? Good         7/5/2025   Nutrition   Three or more servings of calcium each day? Yes   Diet: Low salt    Low fat/cholesterol   How many servings of fruit and vegetables per day? (!) 2-3   How many sweetened beverages each day? 0-1       Multiple values from one day are sorted in reverse-chronological order         7/5/2025   Exercise   Days per week of moderate/strenous exercise 3 days   Average minutes spent exercising at this level 60 min         7/5/2025   Social Factors   Frequency of gathering with friends or relatives More than three times a week   Worry food won't last until get money to buy more No   Food not last or not have enough money for food? No   Do you have housing? (Housing is defined as stable permanent housing and does not include staying outside in a car, in a tent, in an abandoned building, in an overnight shelter, or couch-surfing.) No   Are you worried about losing your housing? No   Lack of transportation? No   Unable to get utilities (heat,electricity)? Yes   Want help with housing or utility concern? No   (!) HOUSING CONCERN PRESENT(!) FINANCIAL RESOURCE STRAIN CONCERN      7/5/2025   Fall Risk   Fallen 2 or more times in the past year? No   Trouble with walking or balance? No          7/5/2025   Dental   Dentist two times every year? Yes         Today's PHQ-2 Score:       7/8/2025    11:48 PM   PHQ-2 ( 1999 [employer])   Q1: Little interest or pleasure in doing things 0   Q2: Feeling down, depressed or hopeless 0   PHQ-2 Score 0    Q1: Little interest or pleasure in doing things Not at all   Q2: Feeling down, depressed or hopeless Not at all   PHQ-2 Score 0       Patient-reported            2025   Substance Use   Alcohol more than 3/day or more than 7/wk Not Applicable   Do you use any other substances recreationally? No     Social History     Tobacco Use    Smoking status: Former     Current packs/day: 0.00     Average packs/day: 0.8 packs/day for 45.0 years (33.8 ttl pk-yrs)     Types: Cigarettes     Start date:      Quit date: 2023     Years since quittin.5     Passive exposure: Never    Smokeless tobacco: Never    Tobacco comments:     Not smoking for last year and half.   Vaping Use    Vaping status: Never Used   Substance Use Topics    Alcohol use: Yes     Comment: rare    Drug use: No           2025   STI Screening   New sexual partner(s) since last STI/HIV test? No   Last PSA:   PSA   Date Value Ref Range Status   2018 1.09 0 - 4 ug/L Final     Comment:     Assay Method:  Chemiluminescence using Siemens Vista analyzer     Prostate Specific Antigen Screen   Date Value Ref Range Status   2025 1.09 0.00 - 4.50 ng/mL Final     ASCVD Risk   The ASCVD Risk score (Ke DK, et al., 2019) failed to calculate for the following reasons:    The valid total cholesterol range is 130 to 320 mg/dL           Reviewed and updated as needed this visit by Provider    Allergies  Meds  Problems               Past Medical History:   Diagnosis Date    Coronary artery disease 2023    Unspecified part of closed fracture of clavicle     comminuted displaced fx of left clavicle     Past Surgical History:   Procedure Laterality Date    BYPASS GRAFT ARTERY CORONARY N/A 2023    Procedure: Median Sternotomy, Cardiopulmonary Byass, Endovascular Greater Saphenous Vein [place] Left Leg, Coronary Artery Bypass Grafting x 3, IntraoperativeTransesophageal Echocardiogram (Anesthsia);  Surgeon: Sushant Ferrer MD;  Location: [facility] OR    COLONOSCOPY      CV CORONARY ANGIOGRAM N/A 2023    Procedure: Coronary Angiogram;  Surgeon: Alvaro Acosta MD;  Location: [facility]  CARDIAC CATH LAB    CV LEFT HEART CATH N/A 05/31/2023    Procedure: Left Heart Catheterization;  Surgeon: Alvaro Acosta MD;  Location: [facility] CARDIAC CATH LAB    CV LEFT VENTRICULOGRAM N/A 05/31/2023    Procedure: Left Ventriculogram;  Surgeon: Alvaro Acosta MD;  Location: [facility] CARDIAC CATH LAB    CV PCI N/A 05/31/2023    Procedure: Percutaneous Coronary Intervention;  Surgeon: Alvaro Acosta MD;  Location: [facility] CARDIAC CATH LAB     Patient Active Problem List   Diagnosis    CARDIOVASCULAR SCREENING; LDL GOAL LESS THAN 130    Tobacco abuse    Thyroid nodule    Hyperlipidemia LDL goal <70    Status post coronary angiogram    Recurrent chest pain    Abnormal nuclear stress test    Left ventricular hypertrophy by electrocardiogram    Coronary artery disease    History of ischemic cardiomyopathy     Past Surgical History:   Procedure Laterality Date    BYPASS GRAFT ARTERY CORONARY N/A 06/30/2023    Procedure: Median Sternotomy, Cardiopulmonary Byass, Endovascular Greater Saphenous Vein [place] Left Leg, Coronary Artery Bypass Grafting x 3, IntraoperativeTransesophageal Echocardiogram (Anesthsia);  Surgeon: Sushant Ferrer MD;  Location: [facility] OR    COLONOSCOPY      CV CORONARY ANGIOGRAM N/A 05/31/2023    Procedure: Coronary Angiogram;  Surgeon: Alvaro Acosta MD;  Location: [facility] CARDIAC CATH LAB    CV LEFT HEART CATH N/A 05/31/2023    Procedure: Left Heart Catheterization;  Surgeon: Alvaro Acosta MD;  Location: [facility] CARDIAC CATH LAB    CV LEFT VENTRICULOGRAM N/A 05/31/2023    Procedure: Left Ventriculogram;  Surgeon: Alvaro Acosta MD;  Location: [facility] CARDIAC CATH LAB    CV PCI N/A 05/31/2023    Procedure: Percutaneous Coronary Intervention;  Surgeon: Alvaro Acosta MD;  Location: [facility] CARDIAC CATH LAB       Social History     Tobacco Use    Smoking status: Former     Current packs/day: 0.00     Average packs/day: 0.8 packs/day for 45.0  "years (33.8 ttl pk-yrs)     Types: Cigarettes     Start date:      Quit date: 2023     Years since quittin.5     Passive exposure: Never    Smokeless tobacco: Never    Tobacco comments:     Not smoking for last year and half.   Substance Use Topics    Alcohol use: Yes     Comment: rare     Family History   Problem Relation Age of Onset    Cancer - colorectal Father     C.A.D. Father     Blood Disease Brother     Cardiovascular Brother     C.A.D. Brother          Current Outpatient Medications   Medication Sig Dispense Refill    aspirin 81 MG EC tablet Take 1 tablet (81 mg) by mouth daily      evolocumab (REPATHA) 140 MG/ML prefilled autoinjector Inject 1 mL (140 mg) subcutaneously every 14 days. 6 mL 3    metoprolol succinate ER (TOPROL XL) 50 MG 24 hr tablet Take 1 tablet (50 mg) by mouth daily. 90 tablet 3    Multiple Vitamin (MULTI-VITAMIN DAILY) TABS Take 1 tablet by mouth daily      rosuvastatin (CRESTOR) 40 MG tablet Take 1 tablet (40 mg) by mouth daily. UPDATED QUANTITY PER RX. Hold until patient calls for refill. 90 tablet 0    acetaminophen (TYLENOL) 325 MG tablet Take 2 tablets (650 mg) by mouth every 4 hours as needed for other (For optimal non-opioid multimodal pain management to improve pain control.) (Patient not taking: Reported on 2024)       No Known Allergies      Review of Systems  Constitutional, HEENT, cardiovascular, pulmonary, GI, , musculoskeletal, neuro, skin, endocrine and psych systems are negative, except as otherwise noted.     Objective    Exam  /82 (BP Location: Right arm, Patient Position: Chair, Cuff Size: Adult Regular)   Pulse 68   Temp 97.6  F (36.4  C) (Tympanic)   Resp 16   Ht 1.797 m (5' 10.\")   Wt 93.2 kg (205 lb 6.4 oz)   SpO2 95%   BMI 28.85 kg/m     Estimated body mass index is 28.85 kg/m  as calculated from the following:    Height as of this encounter: 1.797 m (5' 10.75\").    Weight as of this encounter: 93.2 kg (205 lb 6.4 " oz).    Physical Exam  GENERAL APPEARANCE: overweight, ambulatory w/o assist, alert and no distress  EYES: pink conj, no icterus, PERRL, EOMI  HENT: ear canals and TM's normal, nose clear,  mouth without ulcers or lesions, oropharynx clear and oral mucous membranes moist  NECK: no adenopathy, no asymmetry, masses, or scars and thyroid normal to palpation  RESP: lungs clear to auscultation - no rales, rhonchi or wheezes  CV: normal rate, regular rhythm, no murmur,   ABDOMEN: soft, nontender, no hepatosplenomegaly, no masses and bowel sounds normal  DIRECT RECTAL EXAM: deferred  MS: no musculoskeletal defects are noted and gait is age appropriate without ataxia; no edema  SKIN: no suspicious lesions or rashes  NEURO: Normal strength and tone, sensory exam grossly normal, mentation intact and speech normal         Signed Electronically by: Skip Rubio MD

## 2025-07-09 NOTE — PATIENT INSTRUCTIONS
Patient Education     Be consistent with low salt, low trans fat and low saturated fat diet.  Eat food rich in omega-3-fatty acids as you tolerate. (salmon, olive oil)  Eat 5 cups of vegetables, fruits and whole grains per day.  Limit starchy food (white rice, white bread, white pasta, white potatoes) to less than a cup per meal.  Minimize sweets, junk food and fastfood. Limit soda beverages to one serving per day; best to avoid it altogether though.    Exercise: moderate intensity sustained for at least 30 mins per episode, goal of 150 mins per week at least  Combine cardiovascular and resistance exercises.  These exercise recommendations are in addition to your daily activity at work or home.    Blood tests: You will be contacted in 1-2 days for results of your lab tests.        Preventive Care Advice   This is general advice given by our system to help you stay healthy. However, your care team may have specific advice just for you. Please talk to your care team about your preventive care needs.  Nutrition  Eat 5 or more servings of fruits and vegetables each day.  Try wheat bread, brown rice and whole grain pasta (instead of white bread, rice, and pasta).  Get enough calcium and vitamin D. Check the label on foods and aim for 100% of the RDA (recommended daily allowance).  Lifestyle  Exercise at least 150 minutes each week  (30 minutes a day, 5 days a week).  Do muscle strengthening activities 2 days a week. These help control your weight and prevent disease.  No smoking.  Wear sunscreen to prevent skin cancer.  Have a dental exam and cleaning every 6 months.  Yearly exams  See your health care team every year to talk about:  Any changes in your health.  Any medicines your care team has prescribed.  Preventive care, family planning, and ways to prevent chronic diseases.  Shots (vaccines)   HPV shots (up to age 26), if you've never had them before.  Hepatitis B shots (up to age 59), if you've never had them  before.  COVID-19 shot: Get this shot when it's due.  Flu shot: Get a flu shot every year.  Tetanus shot: Get a tetanus shot every 10 years.  Pneumococcal, hepatitis A, and RSV shots: Ask your care team if you need these based on your risk.  Shingles shot (for age 50 and up)  General health tests  Diabetes screening:  Starting at age 35, Get screened for diabetes at least every 3 years.  If you are younger than age 35, ask your care team if you should be screened for diabetes.  Cholesterol test: At age 39, start having a cholesterol test every 5 years, or more often if advised.  Bone density scan (DEXA): At age 50, ask your care team if you should have this scan for osteoporosis (brittle bones).  Hepatitis C: Get tested at least once in your life.  STIs (sexually transmitted infections)  Before age 24: Ask your care team if you should be screened for STIs.  After age 24: Get screened for STIs if you're at risk. You are at risk for STIs (including HIV) if:  You are sexually active with more than one person.  You don't use condoms every time.  You or a partner was diagnosed with a sexually transmitted infection.  If you are at risk for HIV, ask about PrEP medicine to prevent HIV.  Get tested for HIV at least once in your life, whether you are at risk for HIV or not.  Cancer screening tests  Cervical cancer screening: If you have a cervix, begin getting regular cervical cancer screening tests starting at age 21.  Breast cancer scan (mammogram): If you've ever had breasts, begin having regular mammograms starting at age 40. This is a scan to check for breast cancer.  Colon cancer screening: It is important to start screening for colon cancer at age 45.  Have a colonoscopy test every 10 years (or more often if you're at risk) Or, ask your provider about stool tests like a FIT test every year or Cologuard test every 3 years.  To learn more about your testing options, visit:   .  For help making a decision, visit:    [email or web address].  Prostate cancer screening test: If you have a prostate, ask your care team if a prostate cancer screening test (PSA) at age 55 is right for you.  Lung cancer screening: If you are a current or former smoker ages 50 to 80, ask your care team if ongoing lung cancer screenings are right for you.  For informational purposes only. Not to replace the advice of your health care provider. Copyright   2023 [facility]. All rights reserved. Clinically reviewed by the [facility]. [employer] 410009 - REV 01/24.

## (undated) DEVICE — KIT HAND CONTROL ANGIOTOUCH ACIST 65CM AT-P65

## (undated) DEVICE — BNDG ELASTIC 6"X5YDS STERILE 6611-6S

## (undated) DEVICE — SU PROLENE 6-0 C-1DA 4X24" M8726

## (undated) DEVICE — GLOVE BIOGEL PI SZ 7.0 40870

## (undated) DEVICE — SU VICRYL 3-0 FS-1 27" J442H

## (undated) DEVICE — SU ETHIBOND 2-0 SHDA 30" X563H

## (undated) DEVICE — KIT ENDO VASOVIEW HEMOPRO 2 VH-4000

## (undated) DEVICE — PUNCH AORTIC 4.0MMX8" RCB40

## (undated) DEVICE — CLIP HORIZON LG ORANGE 004200

## (undated) DEVICE — SU PLEDGET SOFT TFE 3/8"X3/26"X1/16" PCP40

## (undated) DEVICE — SU ETHIBOND 3-0 BBDA 36" X588H

## (undated) DEVICE — SYR 10ML LL W/O NDL 302995

## (undated) DEVICE — SPECIMEN CONTAINER 5OZ STERILE 2600SA

## (undated) DEVICE — SU SILK 0 TIE 6X30" A306H

## (undated) DEVICE — CLIP HORIZON MED BLUE 002200

## (undated) DEVICE — INTRO SHEATH 4FRX10CM PINNACLE RSS402

## (undated) DEVICE — GOWN XLG DISP 9545

## (undated) DEVICE — WAND SUCTION LP SOFT 15.2CM SU-22702

## (undated) DEVICE — SUCTION MANIFOLD NEPTUNE 2 SYS 4 PORT 0702-020-000

## (undated) DEVICE — SU PROLENE 4-0 RB-1DA 36" 8557H

## (undated) DEVICE — SU VICRYL 2-0 CT-1 27" UND J259H

## (undated) DEVICE — RX SURGIFLO HEMOSTATIC MATRIX W/THROMBIN 8ML 2994

## (undated) DEVICE — DRSG DRAIN 4X4" 7086

## (undated) DEVICE — SU PROLENE 7-0 BV-1DA 24" 8702H

## (undated) DEVICE — ESU PENCIL SMOKE EVAC W/ROCKER SWITCH 0703-047-000

## (undated) DEVICE — LINEN TOWEL PACK X30 5481

## (undated) DEVICE — BLADE KNIFE BEAVER MINI STR BEAVER6900

## (undated) DEVICE — SOL NACL 0.9% IRRIG 1000ML BOTTLE 2F7124

## (undated) DEVICE — WIPES FOLEY CARE SURESTEP PROVON DFC100

## (undated) DEVICE — SOL NACL 0.9% IRRIG 3000ML BAG 2B7477

## (undated) DEVICE — SUCTION SLEEVE NEPTUNE 2 165MM 0703-005-165

## (undated) DEVICE — TIES BANDING T50R

## (undated) DEVICE — TAPE MEDIPORE 4"X2YD 2864

## (undated) DEVICE — PREP CHLORAPREP 26ML TINTED HI-LITE ORANGE 930815

## (undated) DEVICE — BLADE CLIPPER SGL USE 9680

## (undated) DEVICE — SUCTION DRY CHEST DRAIN OASIS 3600-100

## (undated) DEVICE — DRAPE IOBAN INCISE 23X17" 6650EZ

## (undated) DEVICE — CLIP HORIZON SM RED WIDE SLOT 001201

## (undated) DEVICE — ESU ELEC BLADE E-SEP INSULATED NEPTUNE 70MM 0703-070-002

## (undated) DEVICE — LEAD PACER MYOCARDIAL BIPOLAR TEMPORARY 53CM 6495F

## (undated) DEVICE — DRAIN CHEST TUBE RIGHT ANGLED 28FR 8128

## (undated) DEVICE — PROTECTOR ARM ONE-STEP TRENDELENBURG 40418

## (undated) DEVICE — SOL NACL 0.9% 10ML VIAL 0409-4888-02

## (undated) DEVICE — SUCTION CATH AIRLIFE TRI-FLO W/CONTROL PORT 14FR  T60C

## (undated) DEVICE — SPONGE LAP 12X12" X8425

## (undated) DEVICE — TUBING SUCTION DRAINAGE PLEURAL DUAL 8884714200

## (undated) DEVICE — DRAIN CHEST TUBE 36FR STR 8036

## (undated) DEVICE — SU PROLENE 7-0 BV-1DA 4X24" M8702

## (undated) DEVICE — SU PROLENE 4-0 SHDA 36" 8521H

## (undated) DEVICE — DEFIB PRO-PADZ LVP LQD GEL ADULT 8900-2105-01

## (undated) DEVICE — CATH ANGIO INFINITI 3DRC 4FRX100CM 538476

## (undated) DEVICE — ANTIFOG SOLUTION W/FOAM PAD CF-1002

## (undated) DEVICE — SYR 01ML 27GA 0.5" NDL TBC 309623

## (undated) DEVICE — SU PROLENE 6-0 C-1DA 30" 8706H

## (undated) DEVICE — SU VICRYL 0 CT-1 27" UND J260H

## (undated) DEVICE — DEVICE TISSUE STABILIZATION OCTOBASE 28707

## (undated) DEVICE — SOL NACL 0.9% INJ 1000ML BAG 2B1324X

## (undated) DEVICE — SU PROLENE 3-0 SHDA 36" 8522H

## (undated) DEVICE — CATH DIAG 4FR ANG PIG 538453S

## (undated) DEVICE — DECANTER BAG 2002S

## (undated) DEVICE — SU ETHIBOND 0 CT-1 CR 8X18" CX21D

## (undated) DEVICE — SU VICRYL 0 CTX 36" J370H

## (undated) DEVICE — DRAPE ISOLATION BAG 1003

## (undated) DEVICE — SU STEEL 6 CCS 4X18" M654G

## (undated) DEVICE — TOTE ANGIO CORP PC15AT SAN32CC83O

## (undated) DEVICE — LINEN TOWEL PACK X6 WHITE 5487

## (undated) DEVICE — SURGICEL HEMOSTAT 4X8" 1952

## (undated) DEVICE — SU DERMABOND ADVANCED .7ML DNX12

## (undated) DEVICE — BLADE KNIFE BEAVER MICROSHARP GREEN 377515

## (undated) DEVICE — PACK ADULT HEART UMMC PV15CG92D

## (undated) DEVICE — BNDG ELASTIC 4"X5YDS STERILE 6611-4S

## (undated) DEVICE — BLADE KNIFE SURG 15C 371716

## (undated) DEVICE — TUBING INSUFFLATION PNEUMOCLEAR 0620050100

## (undated) DEVICE — DRSG TELFA 3X8" 1238

## (undated) DEVICE — CANNULA VESSEL 3ML BEVELED DPL 30000

## (undated) DEVICE — NDL PERC ENTRY THINWALL 18GA 7.0" G00166

## (undated) DEVICE — BONE WAX 2.5GM W31G

## (undated) DEVICE — ESU HOLSTER PLASTIC DISP E2400

## (undated) DEVICE — BLADE SAW STERNAL 20X30MM KM-32

## (undated) DEVICE — DRSG ABDOMINAL 07 1/2X8" 7197D

## (undated) DEVICE — CATH DIAG 4FR JL 4.5 538417

## (undated) DEVICE — SU PROLENE 5-0 RB-2DA 30" 8710H

## (undated) DEVICE — CLIP SPRING FOGARTY SOFTJAW CSOFT6

## (undated) DEVICE — SU RETRACT-O-TAPE 1041

## (undated) DEVICE — MANIFOLD KIT ANGIO AUTOMATED 014613

## (undated) DEVICE — ESU ELEC BLADE E-SEP INSULATED NEPTUNE 165MM 0703-165-002

## (undated) DEVICE — DRAPE FLUID WARMING 52 X 60" ORS-321

## (undated) RX ORDER — HEPARIN SODIUM,PORCINE 10 UNIT/ML
VIAL (ML) INTRAVENOUS
Status: DISPENSED
Start: 2023-06-30

## (undated) RX ORDER — ASPIRIN 81 MG/1
TABLET, CHEWABLE ORAL
Status: DISPENSED
Start: 2023-06-30

## (undated) RX ORDER — PROPOFOL 10 MG/ML
INJECTION, EMULSION INTRAVENOUS
Status: DISPENSED
Start: 2023-06-30

## (undated) RX ORDER — ACETAMINOPHEN 325 MG/1
TABLET ORAL
Status: DISPENSED
Start: 2023-06-30

## (undated) RX ORDER — FAMOTIDINE 20 MG/1
TABLET, FILM COATED ORAL
Status: DISPENSED
Start: 2023-06-30

## (undated) RX ORDER — FENTANYL CITRATE 50 UG/ML
INJECTION, SOLUTION INTRAMUSCULAR; INTRAVENOUS
Status: DISPENSED
Start: 2023-05-31

## (undated) RX ORDER — CALCIUM CHLORIDE 100 MG/ML
INJECTION INTRAVENOUS; INTRAVENTRICULAR
Status: DISPENSED
Start: 2023-06-30

## (undated) RX ORDER — HEPARIN SODIUM 1000 [USP'U]/ML
INJECTION, SOLUTION INTRAVENOUS; SUBCUTANEOUS
Status: DISPENSED
Start: 2023-05-31

## (undated) RX ORDER — HEPARIN SODIUM 1000 [USP'U]/ML
INJECTION, SOLUTION INTRAVENOUS; SUBCUTANEOUS
Status: DISPENSED
Start: 2023-06-30

## (undated) RX ORDER — LIDOCAINE HYDROCHLORIDE 10 MG/ML
INJECTION, SOLUTION EPIDURAL; INFILTRATION; INTRACAUDAL; PERINEURAL
Status: DISPENSED
Start: 2023-05-31

## (undated) RX ORDER — FENTANYL CITRATE-0.9 % NACL/PF 10 MCG/ML
PLASTIC BAG, INJECTION (ML) INTRAVENOUS
Status: DISPENSED
Start: 2023-06-30

## (undated) RX ORDER — PAPAVERINE HYDROCHLORIDE 30 MG/ML
INJECTION INTRAMUSCULAR; INTRAVENOUS
Status: DISPENSED
Start: 2023-06-30

## (undated) RX ORDER — CEFAZOLIN SODIUM 1 G/3ML
INJECTION, POWDER, FOR SOLUTION INTRAMUSCULAR; INTRAVENOUS
Status: DISPENSED
Start: 2023-06-30

## (undated) RX ORDER — HEPARIN SODIUM 200 [USP'U]/100ML
INJECTION, SOLUTION INTRAVENOUS
Status: DISPENSED
Start: 2023-05-31

## (undated) RX ORDER — CEFAZOLIN SODIUM/WATER 2 G/20 ML
SYRINGE (ML) INTRAVENOUS
Status: DISPENSED
Start: 2023-06-30

## (undated) RX ORDER — HYDROMORPHONE HYDROCHLORIDE 1 MG/ML
INJECTION, SOLUTION INTRAMUSCULAR; INTRAVENOUS; SUBCUTANEOUS
Status: DISPENSED
Start: 2023-06-30

## (undated) RX ORDER — FENTANYL CITRATE 50 UG/ML
INJECTION, SOLUTION INTRAMUSCULAR; INTRAVENOUS
Status: DISPENSED
Start: 2023-06-30

## (undated) RX ORDER — GABAPENTIN 300 MG/1
CAPSULE ORAL
Status: DISPENSED
Start: 2023-06-30

## (undated) RX ORDER — CHLORHEXIDINE GLUCONATE ORAL RINSE 1.2 MG/ML
SOLUTION DENTAL
Status: DISPENSED
Start: 2023-06-30